# Patient Record
Sex: FEMALE | Race: ASIAN | NOT HISPANIC OR LATINO | URBAN - METROPOLITAN AREA
[De-identification: names, ages, dates, MRNs, and addresses within clinical notes are randomized per-mention and may not be internally consistent; named-entity substitution may affect disease eponyms.]

---

## 2024-01-01 ENCOUNTER — INPATIENT (INPATIENT)
Facility: HOSPITAL | Age: 74
LOS: 23 days | DRG: 176 | End: 2024-12-05
Attending: STUDENT IN AN ORGANIZED HEALTH CARE EDUCATION/TRAINING PROGRAM | Admitting: INTERNAL MEDICINE
Payer: MEDICARE

## 2024-01-01 VITALS
HEART RATE: 119 BPM | OXYGEN SATURATION: 81 % | DIASTOLIC BLOOD PRESSURE: 55 MMHG | TEMPERATURE: 99 F | SYSTOLIC BLOOD PRESSURE: 106 MMHG | RESPIRATION RATE: 18 BRPM

## 2024-01-01 VITALS — HEART RATE: 94 BPM | RESPIRATION RATE: 29 BRPM | OXYGEN SATURATION: 91 %

## 2024-01-01 DIAGNOSIS — R53.2 FUNCTIONAL QUADRIPLEGIA: ICD-10-CM

## 2024-01-01 DIAGNOSIS — I26.99 OTHER PULMONARY EMBOLISM WITHOUT ACUTE COR PULMONALE: ICD-10-CM

## 2024-01-01 DIAGNOSIS — C25.9 MALIGNANT NEOPLASM OF PANCREAS, UNSPECIFIED: ICD-10-CM

## 2024-01-01 DIAGNOSIS — Z29.9 ENCOUNTER FOR PROPHYLACTIC MEASURES, UNSPECIFIED: ICD-10-CM

## 2024-01-01 DIAGNOSIS — J96.01 ACUTE RESPIRATORY FAILURE WITH HYPOXIA: ICD-10-CM

## 2024-01-01 DIAGNOSIS — R06.03 ACUTE RESPIRATORY DISTRESS: ICD-10-CM

## 2024-01-01 DIAGNOSIS — Z51.5 ENCOUNTER FOR PALLIATIVE CARE: ICD-10-CM

## 2024-01-01 DIAGNOSIS — R52 PAIN, UNSPECIFIED: ICD-10-CM

## 2024-01-01 DIAGNOSIS — Z71.89 OTHER SPECIFIED COUNSELING: ICD-10-CM

## 2024-01-01 DIAGNOSIS — R45.1 RESTLESSNESS AND AGITATION: ICD-10-CM

## 2024-01-01 DIAGNOSIS — R09.89 OTHER SPECIFIED SYMPTOMS AND SIGNS INVOLVING THE CIRCULATORY AND RESPIRATORY SYSTEMS: ICD-10-CM

## 2024-01-01 DIAGNOSIS — R06.00 DYSPNEA, UNSPECIFIED: ICD-10-CM

## 2024-01-01 LAB
ADD ON TEST-SPECIMEN IN LAB: SIGNIFICANT CHANGE UP
ALBUMIN SERPL ELPH-MCNC: 1.9 G/DL — LOW (ref 3.3–5)
ALBUMIN SERPL ELPH-MCNC: 2 G/DL — LOW (ref 3.3–5)
ALBUMIN SERPL ELPH-MCNC: 2 G/DL — LOW (ref 3.3–5)
ALP SERPL-CCNC: 215 U/L — HIGH (ref 40–120)
ALP SERPL-CCNC: 217 U/L — HIGH (ref 40–120)
ALP SERPL-CCNC: 233 U/L — HIGH (ref 40–120)
ALT FLD-CCNC: 16 U/L — SIGNIFICANT CHANGE UP (ref 10–45)
ALT FLD-CCNC: 17 U/L — SIGNIFICANT CHANGE UP (ref 10–45)
ALT FLD-CCNC: 19 U/L — SIGNIFICANT CHANGE UP (ref 10–45)
ANION GAP SERPL CALC-SCNC: 10 MMOL/L — SIGNIFICANT CHANGE UP (ref 5–17)
ANION GAP SERPL CALC-SCNC: 10 MMOL/L — SIGNIFICANT CHANGE UP (ref 5–17)
ANION GAP SERPL CALC-SCNC: 11 MMOL/L — SIGNIFICANT CHANGE UP (ref 5–17)
ANION GAP SERPL CALC-SCNC: 12 MMOL/L — SIGNIFICANT CHANGE UP (ref 5–17)
ANION GAP SERPL CALC-SCNC: 12 MMOL/L — SIGNIFICANT CHANGE UP (ref 5–17)
ANION GAP SERPL CALC-SCNC: 8 MMOL/L — SIGNIFICANT CHANGE UP (ref 5–17)
APTT BLD: 24.8 SEC — SIGNIFICANT CHANGE UP (ref 24.5–35.6)
APTT BLD: 34.3 SEC — SIGNIFICANT CHANGE UP (ref 24.5–35.6)
APTT BLD: 37.3 SEC — HIGH (ref 24.5–35.6)
APTT BLD: 49 SEC — HIGH (ref 24.5–35.6)
APTT BLD: 49.8 SEC — HIGH (ref 24.5–35.6)
APTT BLD: 52.3 SEC — HIGH (ref 24.5–35.6)
APTT BLD: 57.3 SEC — HIGH (ref 24.5–35.6)
APTT BLD: 64.1 SEC — HIGH (ref 24.5–35.6)
APTT BLD: 66.8 SEC — HIGH (ref 24.5–35.6)
APTT BLD: 87 SEC — HIGH (ref 24.5–35.6)
APTT BLD: 91.2 SEC — HIGH (ref 24.5–35.6)
AST SERPL-CCNC: 31 U/L — SIGNIFICANT CHANGE UP (ref 10–40)
AST SERPL-CCNC: 33 U/L — SIGNIFICANT CHANGE UP (ref 10–40)
AST SERPL-CCNC: 40 U/L — SIGNIFICANT CHANGE UP (ref 10–40)
BASOPHILS # BLD AUTO: 0.03 K/UL — SIGNIFICANT CHANGE UP (ref 0–0.2)
BASOPHILS NFR BLD AUTO: 0.1 % — SIGNIFICANT CHANGE UP (ref 0–2)
BASOPHILS NFR BLD AUTO: 0.1 % — SIGNIFICANT CHANGE UP (ref 0–2)
BASOPHILS NFR BLD AUTO: 0.2 % — SIGNIFICANT CHANGE UP (ref 0–2)
BILIRUB SERPL-MCNC: 0.7 MG/DL — SIGNIFICANT CHANGE UP (ref 0.2–1.2)
BILIRUB SERPL-MCNC: 0.7 MG/DL — SIGNIFICANT CHANGE UP (ref 0.2–1.2)
BILIRUB SERPL-MCNC: 1.1 MG/DL — SIGNIFICANT CHANGE UP (ref 0.2–1.2)
BLD GP AB SCN SERPL QL: NEGATIVE — SIGNIFICANT CHANGE UP
BLD GP AB SCN SERPL QL: NEGATIVE — SIGNIFICANT CHANGE UP
BUN SERPL-MCNC: 18 MG/DL — SIGNIFICANT CHANGE UP (ref 7–23)
BUN SERPL-MCNC: 18 MG/DL — SIGNIFICANT CHANGE UP (ref 7–23)
BUN SERPL-MCNC: 20 MG/DL — SIGNIFICANT CHANGE UP (ref 7–23)
BUN SERPL-MCNC: 32 MG/DL — HIGH (ref 7–23)
BUN SERPL-MCNC: 33 MG/DL — HIGH (ref 7–23)
BUN SERPL-MCNC: 36 MG/DL — HIGH (ref 7–23)
CALCIUM SERPL-MCNC: 7.3 MG/DL — LOW (ref 8.4–10.5)
CALCIUM SERPL-MCNC: 7.8 MG/DL — LOW (ref 8.4–10.5)
CALCIUM SERPL-MCNC: 7.9 MG/DL — LOW (ref 8.4–10.5)
CALCIUM SERPL-MCNC: 8 MG/DL — LOW (ref 8.4–10.5)
CALCIUM SERPL-MCNC: 8.1 MG/DL — LOW (ref 8.4–10.5)
CALCIUM SERPL-MCNC: 8.1 MG/DL — LOW (ref 8.4–10.5)
CHLORIDE SERPL-SCNC: 107 MMOL/L — SIGNIFICANT CHANGE UP (ref 96–108)
CHLORIDE SERPL-SCNC: 108 MMOL/L — SIGNIFICANT CHANGE UP (ref 96–108)
CHLORIDE SERPL-SCNC: 108 MMOL/L — SIGNIFICANT CHANGE UP (ref 96–108)
CHLORIDE SERPL-SCNC: 109 MMOL/L — HIGH (ref 96–108)
CHLORIDE SERPL-SCNC: 110 MMOL/L — HIGH (ref 96–108)
CHLORIDE SERPL-SCNC: 112 MMOL/L — HIGH (ref 96–108)
CO2 SERPL-SCNC: 18 MMOL/L — LOW (ref 22–31)
CO2 SERPL-SCNC: 24 MMOL/L — SIGNIFICANT CHANGE UP (ref 22–31)
CO2 SERPL-SCNC: 25 MMOL/L — SIGNIFICANT CHANGE UP (ref 22–31)
CO2 SERPL-SCNC: 26 MMOL/L — SIGNIFICANT CHANGE UP (ref 22–31)
CREAT SERPL-MCNC: 0.51 MG/DL — SIGNIFICANT CHANGE UP (ref 0.5–1.3)
CREAT SERPL-MCNC: 0.59 MG/DL — SIGNIFICANT CHANGE UP (ref 0.5–1.3)
CREAT SERPL-MCNC: 0.66 MG/DL — SIGNIFICANT CHANGE UP (ref 0.5–1.3)
CREAT SERPL-MCNC: 0.7 MG/DL — SIGNIFICANT CHANGE UP (ref 0.5–1.3)
CREAT SERPL-MCNC: 0.74 MG/DL — SIGNIFICANT CHANGE UP (ref 0.5–1.3)
CREAT SERPL-MCNC: 0.8 MG/DL — SIGNIFICANT CHANGE UP (ref 0.5–1.3)
CULTURE RESULTS: SIGNIFICANT CHANGE UP
CULTURE RESULTS: SIGNIFICANT CHANGE UP
EGFR: 77 ML/MIN/1.73M2 — SIGNIFICANT CHANGE UP
EGFR: 85 ML/MIN/1.73M2 — SIGNIFICANT CHANGE UP
EGFR: 91 ML/MIN/1.73M2 — SIGNIFICANT CHANGE UP
EGFR: 92 ML/MIN/1.73M2 — SIGNIFICANT CHANGE UP
EGFR: 95 ML/MIN/1.73M2 — SIGNIFICANT CHANGE UP
EGFR: 98 ML/MIN/1.73M2 — SIGNIFICANT CHANGE UP
EOSINOPHIL # BLD AUTO: 0 K/UL — SIGNIFICANT CHANGE UP (ref 0–0.5)
EOSINOPHIL # BLD AUTO: 0 K/UL — SIGNIFICANT CHANGE UP (ref 0–0.5)
EOSINOPHIL # BLD AUTO: 0.01 K/UL — SIGNIFICANT CHANGE UP (ref 0–0.5)
EOSINOPHIL NFR BLD AUTO: 0 % — SIGNIFICANT CHANGE UP (ref 0–6)
EOSINOPHIL NFR BLD AUTO: 0 % — SIGNIFICANT CHANGE UP (ref 0–6)
EOSINOPHIL NFR BLD AUTO: 0.1 % — SIGNIFICANT CHANGE UP (ref 0–6)
GAS PNL BLDA: SIGNIFICANT CHANGE UP
GAS PNL BLDA: SIGNIFICANT CHANGE UP
GLUCOSE SERPL-MCNC: 102 MG/DL — HIGH (ref 70–99)
GLUCOSE SERPL-MCNC: 115 MG/DL — HIGH (ref 70–99)
GLUCOSE SERPL-MCNC: 120 MG/DL — HIGH (ref 70–99)
GLUCOSE SERPL-MCNC: 123 MG/DL — HIGH (ref 70–99)
GLUCOSE SERPL-MCNC: 148 MG/DL — HIGH (ref 70–99)
GLUCOSE SERPL-MCNC: 159 MG/DL — HIGH (ref 70–99)
HAV IGM SER-ACNC: SIGNIFICANT CHANGE UP
HBV CORE IGM SER-ACNC: SIGNIFICANT CHANGE UP
HBV SURFACE AG SER-ACNC: SIGNIFICANT CHANGE UP
HCT VFR BLD CALC: 25.5 % — LOW (ref 34.5–45)
HCT VFR BLD CALC: 25.8 % — LOW (ref 34.5–45)
HCT VFR BLD CALC: 28.3 % — LOW (ref 34.5–45)
HCT VFR BLD CALC: 28.7 % — LOW (ref 34.5–45)
HCT VFR BLD CALC: 32.7 % — LOW (ref 34.5–45)
HCT VFR BLD CALC: 36.6 % — SIGNIFICANT CHANGE UP (ref 34.5–45)
HCV AB S/CO SERPL IA: 0.23 S/CO — SIGNIFICANT CHANGE UP (ref 0–0.99)
HCV AB SERPL-IMP: SIGNIFICANT CHANGE UP
HGB BLD-MCNC: 10.9 G/DL — LOW (ref 11.5–15.5)
HGB BLD-MCNC: 7.5 G/DL — LOW (ref 11.5–15.5)
HGB BLD-MCNC: 7.6 G/DL — LOW (ref 11.5–15.5)
HGB BLD-MCNC: 8.8 G/DL — LOW (ref 11.5–15.5)
HGB BLD-MCNC: 8.8 G/DL — LOW (ref 11.5–15.5)
HGB BLD-MCNC: 9.6 G/DL — LOW (ref 11.5–15.5)
IMM GRANULOCYTES NFR BLD AUTO: 0.6 % — SIGNIFICANT CHANGE UP (ref 0–0.9)
IMM GRANULOCYTES NFR BLD AUTO: 0.9 % — SIGNIFICANT CHANGE UP (ref 0–0.9)
IMM GRANULOCYTES NFR BLD AUTO: 1 % — HIGH (ref 0–0.9)
INR BLD: 1.25 RATIO — HIGH (ref 0.85–1.16)
INR BLD: 1.3 RATIO — HIGH (ref 0.85–1.16)
INR BLD: 1.42 RATIO — HIGH (ref 0.85–1.16)
INR BLD: 1.58 RATIO — HIGH (ref 0.85–1.16)
INR BLD: 1.61 RATIO — HIGH (ref 0.85–1.16)
INR BLD: 1.67 RATIO — HIGH (ref 0.85–1.16)
INR BLD: 1.76 RATIO — HIGH (ref 0.85–1.16)
LYMPHOCYTES # BLD AUTO: 0.8 K/UL — LOW (ref 1–3.3)
LYMPHOCYTES # BLD AUTO: 0.85 K/UL — LOW (ref 1–3.3)
LYMPHOCYTES # BLD AUTO: 0.86 K/UL — LOW (ref 1–3.3)
LYMPHOCYTES # BLD AUTO: 3.5 % — LOW (ref 13–44)
LYMPHOCYTES # BLD AUTO: 3.9 % — LOW (ref 13–44)
LYMPHOCYTES # BLD AUTO: 5.5 % — LOW (ref 13–44)
MAGNESIUM SERPL-MCNC: 1.9 MG/DL — SIGNIFICANT CHANGE UP (ref 1.6–2.6)
MAGNESIUM SERPL-MCNC: 2 MG/DL — SIGNIFICANT CHANGE UP (ref 1.6–2.6)
MAGNESIUM SERPL-MCNC: 2 MG/DL — SIGNIFICANT CHANGE UP (ref 1.6–2.6)
MAGNESIUM SERPL-MCNC: 2.3 MG/DL — SIGNIFICANT CHANGE UP (ref 1.6–2.6)
MCHC RBC-ENTMCNC: 24.6 PG — LOW (ref 27–34)
MCHC RBC-ENTMCNC: 24.7 PG — LOW (ref 27–34)
MCHC RBC-ENTMCNC: 24.8 PG — LOW (ref 27–34)
MCHC RBC-ENTMCNC: 25.3 PG — LOW (ref 27–34)
MCHC RBC-ENTMCNC: 25.5 PG — LOW (ref 27–34)
MCHC RBC-ENTMCNC: 25.8 PG — LOW (ref 27–34)
MCHC RBC-ENTMCNC: 29.4 G/DL — LOW (ref 32–36)
MCHC RBC-ENTMCNC: 29.4 G/DL — LOW (ref 32–36)
MCHC RBC-ENTMCNC: 29.5 G/DL — LOW (ref 32–36)
MCHC RBC-ENTMCNC: 29.8 G/DL — LOW (ref 32–36)
MCHC RBC-ENTMCNC: 30.7 G/DL — LOW (ref 32–36)
MCHC RBC-ENTMCNC: 31.1 G/DL — LOW (ref 32–36)
MCV RBC AUTO: 83 FL — SIGNIFICANT CHANGE UP (ref 80–100)
MCV RBC AUTO: 83.2 FL — SIGNIFICANT CHANGE UP (ref 80–100)
MCV RBC AUTO: 83.2 FL — SIGNIFICANT CHANGE UP (ref 80–100)
MCV RBC AUTO: 83.5 FL — SIGNIFICANT CHANGE UP (ref 80–100)
MCV RBC AUTO: 84.1 FL — SIGNIFICANT CHANGE UP (ref 80–100)
MCV RBC AUTO: 85.9 FL — SIGNIFICANT CHANGE UP (ref 80–100)
MONOCYTES # BLD AUTO: 0.44 K/UL — SIGNIFICANT CHANGE UP (ref 0–0.9)
MONOCYTES # BLD AUTO: 0.48 K/UL — SIGNIFICANT CHANGE UP (ref 0–0.9)
MONOCYTES # BLD AUTO: 0.51 K/UL — SIGNIFICANT CHANGE UP (ref 0–0.9)
MONOCYTES NFR BLD AUTO: 1.9 % — LOW (ref 2–14)
MONOCYTES NFR BLD AUTO: 2.2 % — SIGNIFICANT CHANGE UP (ref 2–14)
MONOCYTES NFR BLD AUTO: 3.3 % — SIGNIFICANT CHANGE UP (ref 2–14)
MRSA PCR RESULT.: SIGNIFICANT CHANGE UP
NEUTROPHILS # BLD AUTO: 13.99 K/UL — HIGH (ref 1.8–7.4)
NEUTROPHILS # BLD AUTO: 20.27 K/UL — HIGH (ref 1.8–7.4)
NEUTROPHILS # BLD AUTO: 21.24 K/UL — HIGH (ref 1.8–7.4)
NEUTROPHILS NFR BLD AUTO: 90.3 % — HIGH (ref 43–77)
NEUTROPHILS NFR BLD AUTO: 92.8 % — HIGH (ref 43–77)
NEUTROPHILS NFR BLD AUTO: 93.6 % — HIGH (ref 43–77)
NRBC # BLD: 0 /100 WBCS — SIGNIFICANT CHANGE UP (ref 0–0)
NT-PROBNP SERPL-SCNC: 3518 PG/ML — HIGH (ref 0–300)
PHOSPHATE SERPL-MCNC: 3 MG/DL — SIGNIFICANT CHANGE UP (ref 2.5–4.5)
PHOSPHATE SERPL-MCNC: 3.5 MG/DL — SIGNIFICANT CHANGE UP (ref 2.5–4.5)
PHOSPHATE SERPL-MCNC: 3.9 MG/DL — SIGNIFICANT CHANGE UP (ref 2.5–4.5)
PLATELET # BLD AUTO: 106 K/UL — LOW (ref 150–400)
PLATELET # BLD AUTO: 142 K/UL — LOW (ref 150–400)
PLATELET # BLD AUTO: 153 K/UL — SIGNIFICANT CHANGE UP (ref 150–400)
PLATELET # BLD AUTO: 181 K/UL — SIGNIFICANT CHANGE UP (ref 150–400)
PLATELET # BLD AUTO: 211 K/UL — SIGNIFICANT CHANGE UP (ref 150–400)
PLATELET # BLD AUTO: 216 K/UL — SIGNIFICANT CHANGE UP (ref 150–400)
POTASSIUM SERPL-MCNC: 3.1 MMOL/L — LOW (ref 3.5–5.3)
POTASSIUM SERPL-MCNC: 3.2 MMOL/L — LOW (ref 3.5–5.3)
POTASSIUM SERPL-MCNC: 3.3 MMOL/L — LOW (ref 3.5–5.3)
POTASSIUM SERPL-MCNC: 3.6 MMOL/L — SIGNIFICANT CHANGE UP (ref 3.5–5.3)
POTASSIUM SERPL-MCNC: 3.7 MMOL/L — SIGNIFICANT CHANGE UP (ref 3.5–5.3)
POTASSIUM SERPL-MCNC: 5.2 MMOL/L — SIGNIFICANT CHANGE UP (ref 3.5–5.3)
POTASSIUM SERPL-SCNC: 3.1 MMOL/L — LOW (ref 3.5–5.3)
POTASSIUM SERPL-SCNC: 3.2 MMOL/L — LOW (ref 3.5–5.3)
POTASSIUM SERPL-SCNC: 3.3 MMOL/L — LOW (ref 3.5–5.3)
POTASSIUM SERPL-SCNC: 3.6 MMOL/L — SIGNIFICANT CHANGE UP (ref 3.5–5.3)
POTASSIUM SERPL-SCNC: 3.7 MMOL/L — SIGNIFICANT CHANGE UP (ref 3.5–5.3)
POTASSIUM SERPL-SCNC: 5.2 MMOL/L — SIGNIFICANT CHANGE UP (ref 3.5–5.3)
PROT SERPL-MCNC: 6.4 G/DL — SIGNIFICANT CHANGE UP (ref 6–8.3)
PROT SERPL-MCNC: 6.6 G/DL — SIGNIFICANT CHANGE UP (ref 6–8.3)
PROT SERPL-MCNC: 6.8 G/DL — SIGNIFICANT CHANGE UP (ref 6–8.3)
PROTHROM AB SERPL-ACNC: 14.4 SEC — HIGH (ref 9.9–13.4)
PROTHROM AB SERPL-ACNC: 14.8 SEC — HIGH (ref 9.9–13.4)
PROTHROM AB SERPL-ACNC: 16.1 SEC — HIGH (ref 9.9–13.4)
PROTHROM AB SERPL-ACNC: 17.9 SEC — HIGH (ref 9.9–13.4)
PROTHROM AB SERPL-ACNC: 18.4 SEC — HIGH (ref 9.9–13.4)
PROTHROM AB SERPL-ACNC: 18.9 SEC — HIGH (ref 9.9–13.4)
PROTHROM AB SERPL-ACNC: 20.1 SEC — HIGH (ref 9.9–13.4)
RBC # BLD: 2.97 M/UL — LOW (ref 3.8–5.2)
RBC # BLD: 3.09 M/UL — LOW (ref 3.8–5.2)
RBC # BLD: 3.41 M/UL — LOW (ref 3.8–5.2)
RBC # BLD: 3.45 M/UL — LOW (ref 3.8–5.2)
RBC # BLD: 3.89 M/UL — SIGNIFICANT CHANGE UP (ref 3.8–5.2)
RBC # BLD: 4.4 M/UL — SIGNIFICANT CHANGE UP (ref 3.8–5.2)
RBC # FLD: 19.2 % — HIGH (ref 10.3–14.5)
RBC # FLD: 19.4 % — HIGH (ref 10.3–14.5)
RBC # FLD: 19.7 % — HIGH (ref 10.3–14.5)
RBC # FLD: 19.9 % — HIGH (ref 10.3–14.5)
RBC # FLD: 20.4 % — HIGH (ref 10.3–14.5)
RBC # FLD: 21 % — HIGH (ref 10.3–14.5)
RH IG SCN BLD-IMP: POSITIVE — SIGNIFICANT CHANGE UP
RH IG SCN BLD-IMP: POSITIVE — SIGNIFICANT CHANGE UP
S AUREUS DNA NOSE QL NAA+PROBE: DETECTED
SODIUM SERPL-SCNC: 138 MMOL/L — SIGNIFICANT CHANGE UP (ref 135–145)
SODIUM SERPL-SCNC: 143 MMOL/L — SIGNIFICANT CHANGE UP (ref 135–145)
SODIUM SERPL-SCNC: 144 MMOL/L — SIGNIFICANT CHANGE UP (ref 135–145)
SODIUM SERPL-SCNC: 144 MMOL/L — SIGNIFICANT CHANGE UP (ref 135–145)
SODIUM SERPL-SCNC: 145 MMOL/L — SIGNIFICANT CHANGE UP (ref 135–145)
SODIUM SERPL-SCNC: 146 MMOL/L — HIGH (ref 135–145)
SPECIMEN SOURCE: SIGNIFICANT CHANGE UP
SPECIMEN SOURCE: SIGNIFICANT CHANGE UP
TROPONIN T, HIGH SENSITIVITY RESULT: 54 NG/L — HIGH (ref 0–51)
TSH SERPL-MCNC: 1.37 UIU/ML — SIGNIFICANT CHANGE UP (ref 0.27–4.2)
WBC # BLD: 12.32 K/UL — HIGH (ref 3.8–10.5)
WBC # BLD: 15.49 K/UL — HIGH (ref 3.8–10.5)
WBC # BLD: 21.85 K/UL — HIGH (ref 3.8–10.5)
WBC # BLD: 22.71 K/UL — HIGH (ref 3.8–10.5)
WBC # BLD: 6.18 K/UL — SIGNIFICANT CHANGE UP (ref 3.8–10.5)
WBC # BLD: 6.78 K/UL — SIGNIFICANT CHANGE UP (ref 3.8–10.5)
WBC # FLD AUTO: 12.32 K/UL — HIGH (ref 3.8–10.5)
WBC # FLD AUTO: 15.49 K/UL — HIGH (ref 3.8–10.5)
WBC # FLD AUTO: 21.85 K/UL — HIGH (ref 3.8–10.5)
WBC # FLD AUTO: 22.71 K/UL — HIGH (ref 3.8–10.5)
WBC # FLD AUTO: 6.18 K/UL — SIGNIFICANT CHANGE UP (ref 3.8–10.5)
WBC # FLD AUTO: 6.78 K/UL — SIGNIFICANT CHANGE UP (ref 3.8–10.5)

## 2024-01-01 PROCEDURE — 85027 COMPLETE CBC AUTOMATED: CPT

## 2024-01-01 PROCEDURE — 80048 BASIC METABOLIC PNL TOTAL CA: CPT

## 2024-01-01 PROCEDURE — 83880 ASSAY OF NATRIURETIC PEPTIDE: CPT

## 2024-01-01 PROCEDURE — 82435 ASSAY OF BLOOD CHLORIDE: CPT

## 2024-01-01 PROCEDURE — 83605 ASSAY OF LACTIC ACID: CPT

## 2024-01-01 PROCEDURE — 99291 CRITICAL CARE FIRST HOUR: CPT

## 2024-01-01 PROCEDURE — 99223 1ST HOSP IP/OBS HIGH 75: CPT

## 2024-01-01 PROCEDURE — 85730 THROMBOPLASTIN TIME PARTIAL: CPT

## 2024-01-01 PROCEDURE — 82947 ASSAY GLUCOSE BLOOD QUANT: CPT

## 2024-01-01 PROCEDURE — 93325 DOPPLER ECHO COLOR FLOW MAPG: CPT | Mod: 26

## 2024-01-01 PROCEDURE — 86901 BLOOD TYPING SEROLOGIC RH(D): CPT

## 2024-01-01 PROCEDURE — 93010 ELECTROCARDIOGRAM REPORT: CPT

## 2024-01-01 PROCEDURE — 99233 SBSQ HOSP IP/OBS HIGH 50: CPT

## 2024-01-01 PROCEDURE — 87640 STAPH A DNA AMP PROBE: CPT

## 2024-01-01 PROCEDURE — 99232 SBSQ HOSP IP/OBS MODERATE 35: CPT

## 2024-01-01 PROCEDURE — 93308 TTE F-UP OR LMTD: CPT | Mod: 26

## 2024-01-01 PROCEDURE — 99233 SBSQ HOSP IP/OBS HIGH 50: CPT | Mod: GC

## 2024-01-01 PROCEDURE — 84132 ASSAY OF SERUM POTASSIUM: CPT

## 2024-01-01 PROCEDURE — 84484 ASSAY OF TROPONIN QUANT: CPT

## 2024-01-01 PROCEDURE — 93005 ELECTROCARDIOGRAM TRACING: CPT

## 2024-01-01 PROCEDURE — 93325 DOPPLER ECHO COLOR FLOW MAPG: CPT

## 2024-01-01 PROCEDURE — 93308 TTE F-UP OR LMTD: CPT

## 2024-01-01 PROCEDURE — 36415 COLL VENOUS BLD VENIPUNCTURE: CPT

## 2024-01-01 PROCEDURE — 93321 DOPPLER ECHO F-UP/LMTD STD: CPT | Mod: 26

## 2024-01-01 PROCEDURE — 85025 COMPLETE CBC W/AUTO DIFF WBC: CPT

## 2024-01-01 PROCEDURE — 99231 SBSQ HOSP IP/OBS SF/LOW 25: CPT

## 2024-01-01 PROCEDURE — 94660 CPAP INITIATION&MGMT: CPT

## 2024-01-01 PROCEDURE — 82803 BLOOD GASES ANY COMBINATION: CPT

## 2024-01-01 PROCEDURE — 71045 X-RAY EXAM CHEST 1 VIEW: CPT

## 2024-01-01 PROCEDURE — 85520 HEPARIN ASSAY: CPT

## 2024-01-01 PROCEDURE — 71045 X-RAY EXAM CHEST 1 VIEW: CPT | Mod: 26,77

## 2024-01-01 PROCEDURE — 99222 1ST HOSP IP/OBS MODERATE 55: CPT | Mod: GC

## 2024-01-01 PROCEDURE — 99222 1ST HOSP IP/OBS MODERATE 55: CPT

## 2024-01-01 PROCEDURE — 86900 BLOOD TYPING SEROLOGIC ABO: CPT

## 2024-01-01 PROCEDURE — 87641 MR-STAPH DNA AMP PROBE: CPT

## 2024-01-01 PROCEDURE — 99497 ADVNCD CARE PLAN 30 MIN: CPT | Mod: 25

## 2024-01-01 PROCEDURE — 85014 HEMATOCRIT: CPT

## 2024-01-01 PROCEDURE — 80053 COMPREHEN METABOLIC PANEL: CPT

## 2024-01-01 PROCEDURE — 85610 PROTHROMBIN TIME: CPT

## 2024-01-01 PROCEDURE — 84443 ASSAY THYROID STIM HORMONE: CPT

## 2024-01-01 PROCEDURE — 84295 ASSAY OF SERUM SODIUM: CPT

## 2024-01-01 PROCEDURE — 84100 ASSAY OF PHOSPHORUS: CPT

## 2024-01-01 PROCEDURE — 83735 ASSAY OF MAGNESIUM: CPT

## 2024-01-01 PROCEDURE — 85018 HEMOGLOBIN: CPT

## 2024-01-01 PROCEDURE — 86850 RBC ANTIBODY SCREEN: CPT

## 2024-01-01 PROCEDURE — 82330 ASSAY OF CALCIUM: CPT

## 2024-01-01 PROCEDURE — 87040 BLOOD CULTURE FOR BACTERIA: CPT

## 2024-01-01 PROCEDURE — 80074 ACUTE HEPATITIS PANEL: CPT

## 2024-01-01 PROCEDURE — 93321 DOPPLER ECHO F-UP/LMTD STD: CPT

## 2024-01-01 RX ORDER — ACETAMINOPHEN 500MG 500 MG/1
650 TABLET, COATED ORAL EVERY 8 HOURS
Refills: 0 | Status: DISCONTINUED | OUTPATIENT
Start: 2024-01-01 | End: 2024-01-01

## 2024-01-01 RX ORDER — GLYCOPYRROLATE 1 MG/1
0.4 TABLET ORAL EVERY 6 HOURS
Refills: 0 | Status: DISCONTINUED | OUTPATIENT
Start: 2024-01-01 | End: 2024-01-01

## 2024-01-01 RX ORDER — BENZOCAINE 10 %
1 OINTMENT (GRAM) TOPICAL ONCE
Refills: 0 | Status: COMPLETED | OUTPATIENT
Start: 2024-01-01 | End: 2024-01-01

## 2024-01-01 RX ORDER — FENTANYL 12 UG/H
25 PATCH, EXTENDED RELEASE TRANSDERMAL ONCE
Refills: 0 | Status: DISCONTINUED | OUTPATIENT
Start: 2024-01-01 | End: 2024-01-01

## 2024-01-01 RX ORDER — PANTOPRAZOLE SODIUM 40 MG/1
40 TABLET, DELAYED RELEASE ORAL
Refills: 0 | Status: DISCONTINUED | OUTPATIENT
Start: 2024-01-01 | End: 2024-01-01

## 2024-01-01 RX ORDER — FUROSEMIDE 40 MG/1
20 TABLET ORAL ONCE
Refills: 0 | Status: COMPLETED | OUTPATIENT
Start: 2024-01-01 | End: 2024-01-01

## 2024-01-01 RX ORDER — FUROSEMIDE 40 MG/1
20 TABLET ORAL ONCE
Refills: 0 | Status: DISCONTINUED | OUTPATIENT
Start: 2024-01-01 | End: 2024-01-01

## 2024-01-01 RX ORDER — POTASSIUM CHLORIDE 600 MG/1
10 TABLET, EXTENDED RELEASE ORAL
Refills: 0 | Status: COMPLETED | OUTPATIENT
Start: 2024-01-01 | End: 2024-01-01

## 2024-01-01 RX ORDER — ENOXAPARIN SODIUM 30 MG/.3ML
50 INJECTION SUBCUTANEOUS
Refills: 0 | Status: DISCONTINUED | OUTPATIENT
Start: 2024-01-01 | End: 2024-01-01

## 2024-01-01 RX ORDER — SALIVA SUBSTITUTE COMBO NO.3
5 AEROSOL, SPRAY WITH PUMP (ML) MUCOUS MEMBRANE
Refills: 0 | Status: DISCONTINUED | OUTPATIENT
Start: 2024-01-01 | End: 2024-01-01

## 2024-01-01 RX ORDER — PANTOPRAZOLE SODIUM 40 MG/1
40 TABLET, DELAYED RELEASE ORAL DAILY
Refills: 0 | Status: DISCONTINUED | OUTPATIENT
Start: 2024-01-01 | End: 2024-01-01

## 2024-01-01 RX ORDER — PIPERACILLIN SODIUM AND TAZOBACTAM SODIUM 4; .5 G/20ML; G/20ML
3.38 INJECTION, POWDER, LYOPHILIZED, FOR SOLUTION INTRAVENOUS EVERY 8 HOURS
Refills: 0 | Status: DISCONTINUED | OUTPATIENT
Start: 2024-01-01 | End: 2024-01-01

## 2024-01-01 RX ORDER — BENZOCAINE 10 %
1 OINTMENT (GRAM) TOPICAL THREE TIMES A DAY
Refills: 0 | Status: DISCONTINUED | OUTPATIENT
Start: 2024-01-01 | End: 2024-01-01

## 2024-01-01 RX ORDER — CYANOCOBALAMIN/FOLIC AC/VIT B6 1-2.2-25MG
1 TABLET ORAL DAILY
Refills: 0 | Status: DISCONTINUED | OUTPATIENT
Start: 2024-01-01 | End: 2024-01-01

## 2024-01-01 RX ORDER — VANCOMYCIN HCL 900 MCG/MG
1250 POWDER (GRAM) MISCELLANEOUS ONCE
Refills: 0 | Status: DISCONTINUED | OUTPATIENT
Start: 2024-01-01 | End: 2024-01-01

## 2024-01-01 RX ORDER — HEPARIN SODIUM,PORCINE 1000/ML
1250 VIAL (ML) INJECTION
Qty: 25000 | Refills: 0 | Status: DISCONTINUED | OUTPATIENT
Start: 2024-01-01 | End: 2024-01-01

## 2024-01-01 RX ORDER — CHLORHEXIDINE GLUCONATE 1.2 MG/ML
1 RINSE ORAL DAILY
Refills: 0 | Status: DISCONTINUED | OUTPATIENT
Start: 2024-01-01 | End: 2024-01-01

## 2024-01-01 RX ORDER — MULTIVIT WITH MINERALS/LUTEIN
500 TABLET ORAL DAILY
Refills: 0 | Status: DISCONTINUED | OUTPATIENT
Start: 2024-01-01 | End: 2024-01-01

## 2024-01-01 RX ORDER — HEPARIN SODIUM,PORCINE 1000/ML
650 VIAL (ML) INJECTION
Qty: 25000 | Refills: 0 | Status: DISCONTINUED | OUTPATIENT
Start: 2024-01-01 | End: 2024-01-01

## 2024-01-01 RX ORDER — POTASSIUM CHLORIDE 600 MG/1
40 TABLET, EXTENDED RELEASE ORAL
Refills: 0 | Status: DISCONTINUED | OUTPATIENT
Start: 2024-01-01 | End: 2024-01-01

## 2024-01-01 RX ORDER — ACETAMINOPHEN 500MG 500 MG/1
1000 TABLET, COATED ORAL ONCE
Refills: 0 | Status: COMPLETED | OUTPATIENT
Start: 2024-01-01 | End: 2024-01-01

## 2024-01-01 RX ORDER — SODIUM CHLORIDE 9 MG/ML
1000 INJECTION, SOLUTION INTRAMUSCULAR; INTRAVENOUS; SUBCUTANEOUS
Refills: 0 | Status: DISCONTINUED | OUTPATIENT
Start: 2024-01-01 | End: 2024-01-01

## 2024-01-01 RX ORDER — LORAZEPAM 2 MG/1
0.25 TABLET ORAL
Refills: 0 | Status: DISCONTINUED | OUTPATIENT
Start: 2024-01-01 | End: 2024-01-01

## 2024-01-01 RX ORDER — SENNOSIDES 8.6 MG
2 TABLET ORAL AT BEDTIME
Refills: 0 | Status: DISCONTINUED | OUTPATIENT
Start: 2024-01-01 | End: 2024-01-01

## 2024-01-01 RX ORDER — CHLORHEXIDINE GLUCONATE 1.2 MG/ML
1 RINSE ORAL
Refills: 0 | Status: DISCONTINUED | OUTPATIENT
Start: 2024-01-01 | End: 2024-01-01

## 2024-01-01 RX ORDER — ACETAMINOPHEN 500MG 500 MG/1
1000 TABLET, COATED ORAL ONCE
Refills: 0 | Status: DISCONTINUED | OUTPATIENT
Start: 2024-01-01 | End: 2024-01-01

## 2024-01-01 RX ORDER — FUROSEMIDE 40 MG/1
40 TABLET ORAL ONCE
Refills: 0 | Status: DISCONTINUED | OUTPATIENT
Start: 2024-01-01 | End: 2024-01-01

## 2024-01-01 RX ORDER — PIPERACILLIN SODIUM AND TAZOBACTAM SODIUM 4; .5 G/20ML; G/20ML
3.38 INJECTION, POWDER, LYOPHILIZED, FOR SOLUTION INTRAVENOUS ONCE
Refills: 0 | Status: COMPLETED | OUTPATIENT
Start: 2024-01-01 | End: 2024-01-01

## 2024-01-01 RX ORDER — VANCOMYCIN HCL 900 MCG/MG
1000 POWDER (GRAM) MISCELLANEOUS ONCE
Refills: 0 | Status: COMPLETED | OUTPATIENT
Start: 2024-01-01 | End: 2024-01-01

## 2024-01-01 RX ORDER — FUROSEMIDE 40 MG/1
40 TABLET ORAL ONCE
Refills: 0 | Status: COMPLETED | OUTPATIENT
Start: 2024-01-01 | End: 2024-01-01

## 2024-01-01 RX ORDER — HEPARIN SODIUM,PORCINE 1000/ML
1200 VIAL (ML) INJECTION
Qty: 25000 | Refills: 0 | Status: DISCONTINUED | OUTPATIENT
Start: 2024-01-01 | End: 2024-01-01

## 2024-01-01 RX ADMIN — POTASSIUM CHLORIDE 100 MILLIEQUIVALENT(S): 600 TABLET, EXTENDED RELEASE ORAL at 23:36

## 2024-01-01 RX ADMIN — PIPERACILLIN SODIUM AND TAZOBACTAM SODIUM 25 GRAM(S): 4; .5 INJECTION, POWDER, LYOPHILIZED, FOR SOLUTION INTRAVENOUS at 21:25

## 2024-01-01 RX ADMIN — Medication 4 MILLIGRAM(S): at 17:31

## 2024-01-01 RX ADMIN — POTASSIUM CHLORIDE 100 MILLIEQUIVALENT(S): 600 TABLET, EXTENDED RELEASE ORAL at 11:00

## 2024-01-01 RX ADMIN — PANTOPRAZOLE SODIUM 40 MILLIGRAM(S): 40 TABLET, DELAYED RELEASE ORAL at 05:06

## 2024-01-01 RX ADMIN — FENTANYL 25 MICROGRAM(S): 12 PATCH, EXTENDED RELEASE TRANSDERMAL at 15:15

## 2024-01-01 RX ADMIN — Medication 5 MILLILITER(S): at 05:48

## 2024-01-01 RX ADMIN — POTASSIUM CHLORIDE 100 MILLIEQUIVALENT(S): 600 TABLET, EXTENDED RELEASE ORAL at 06:49

## 2024-01-01 RX ADMIN — Medication 100 GRAM(S): at 22:02

## 2024-01-01 RX ADMIN — PANTOPRAZOLE SODIUM 40 MILLIGRAM(S): 40 TABLET, DELAYED RELEASE ORAL at 17:27

## 2024-01-01 RX ADMIN — Medication 5 MILLILITER(S): at 11:11

## 2024-01-01 RX ADMIN — Medication 2 MILLIGRAM(S): at 23:52

## 2024-01-01 RX ADMIN — SODIUM CHLORIDE 10 MILLILITER(S): 9 INJECTION, SOLUTION INTRAMUSCULAR; INTRAVENOUS; SUBCUTANEOUS at 07:02

## 2024-01-01 RX ADMIN — SODIUM CHLORIDE 10 MILLILITER(S): 9 INJECTION, SOLUTION INTRAMUSCULAR; INTRAVENOUS; SUBCUTANEOUS at 05:45

## 2024-01-01 RX ADMIN — Medication 2 MILLIGRAM(S): at 22:45

## 2024-01-01 RX ADMIN — Medication 1 SPRAY(S): at 16:01

## 2024-01-01 RX ADMIN — Medication 2 MILLIGRAM(S): at 06:04

## 2024-01-01 RX ADMIN — Medication 2 MILLIGRAM(S): at 11:01

## 2024-01-01 RX ADMIN — Medication 5 MILLILITER(S): at 00:29

## 2024-01-01 RX ADMIN — Medication 2 MILLIGRAM(S): at 02:21

## 2024-01-01 RX ADMIN — Medication 11 UNIT(S)/HR: at 22:18

## 2024-01-01 RX ADMIN — Medication 13 UNIT(S)/HR: at 09:46

## 2024-01-01 RX ADMIN — Medication 5 MILLILITER(S): at 06:52

## 2024-01-01 RX ADMIN — Medication 5 MILLILITER(S): at 05:12

## 2024-01-01 RX ADMIN — PANTOPRAZOLE SODIUM 40 MILLIGRAM(S): 40 TABLET, DELAYED RELEASE ORAL at 19:02

## 2024-01-01 RX ADMIN — PIPERACILLIN SODIUM AND TAZOBACTAM SODIUM 3.38 GRAM(S): 4; .5 INJECTION, POWDER, LYOPHILIZED, FOR SOLUTION INTRAVENOUS at 18:30

## 2024-01-01 RX ADMIN — ENOXAPARIN SODIUM 50 MILLIGRAM(S): 30 INJECTION SUBCUTANEOUS at 17:42

## 2024-01-01 RX ADMIN — Medication 1 SPRAY(S): at 20:49

## 2024-01-01 RX ADMIN — Medication 5 MILLILITER(S): at 13:30

## 2024-01-01 RX ADMIN — Medication 5 MILLILITER(S): at 17:54

## 2024-01-01 RX ADMIN — Medication 5 MILLILITER(S): at 12:23

## 2024-01-01 RX ADMIN — PANTOPRAZOLE SODIUM 40 MILLIGRAM(S): 40 TABLET, DELAYED RELEASE ORAL at 18:14

## 2024-01-01 RX ADMIN — Medication 5 MILLILITER(S): at 17:42

## 2024-01-01 RX ADMIN — Medication 12 UNIT(S)/HR: at 05:21

## 2024-01-01 RX ADMIN — Medication 2 MILLIGRAM(S): at 02:05

## 2024-01-01 RX ADMIN — Medication 6 MILLIGRAM(S): at 08:30

## 2024-01-01 RX ADMIN — Medication 4 MILLIGRAM(S): at 10:08

## 2024-01-01 RX ADMIN — Medication 2 MILLIGRAM(S): at 23:20

## 2024-01-01 RX ADMIN — Medication 2 MILLIGRAM(S): at 12:24

## 2024-01-01 RX ADMIN — PANTOPRAZOLE SODIUM 40 MILLIGRAM(S): 40 TABLET, DELAYED RELEASE ORAL at 18:35

## 2024-01-01 RX ADMIN — PIPERACILLIN SODIUM AND TAZOBACTAM SODIUM 25 GRAM(S): 4; .5 INJECTION, POWDER, LYOPHILIZED, FOR SOLUTION INTRAVENOUS at 05:17

## 2024-01-01 RX ADMIN — FENTANYL 25 MICROGRAM(S): 12 PATCH, EXTENDED RELEASE TRANSDERMAL at 15:12

## 2024-01-01 RX ADMIN — Medication 4 MILLIGRAM(S): at 09:53

## 2024-01-01 RX ADMIN — Medication 5 MILLILITER(S): at 13:53

## 2024-01-01 RX ADMIN — PIPERACILLIN SODIUM AND TAZOBACTAM SODIUM 25 GRAM(S): 4; .5 INJECTION, POWDER, LYOPHILIZED, FOR SOLUTION INTRAVENOUS at 05:04

## 2024-01-01 RX ADMIN — Medication 5 MILLILITER(S): at 12:30

## 2024-01-01 RX ADMIN — Medication 2 MILLIGRAM(S): at 09:08

## 2024-01-01 RX ADMIN — ACETAMINOPHEN 500MG 1000 MILLIGRAM(S): 500 TABLET, COATED ORAL at 09:45

## 2024-01-01 RX ADMIN — Medication 2 MILLIGRAM(S): at 05:18

## 2024-01-01 RX ADMIN — PIPERACILLIN SODIUM AND TAZOBACTAM SODIUM 25 GRAM(S): 4; .5 INJECTION, POWDER, LYOPHILIZED, FOR SOLUTION INTRAVENOUS at 06:48

## 2024-01-01 RX ADMIN — PIPERACILLIN SODIUM AND TAZOBACTAM SODIUM 25 GRAM(S): 4; .5 INJECTION, POWDER, LYOPHILIZED, FOR SOLUTION INTRAVENOUS at 05:24

## 2024-01-01 RX ADMIN — CHLORHEXIDINE GLUCONATE 1 APPLICATION(S): 1.2 RINSE ORAL at 12:45

## 2024-01-01 RX ADMIN — Medication 2 MILLIGRAM(S): at 19:56

## 2024-01-01 RX ADMIN — Medication 4 MILLIGRAM(S): at 10:25

## 2024-01-01 RX ADMIN — PANTOPRAZOLE SODIUM 40 MILLIGRAM(S): 40 TABLET, DELAYED RELEASE ORAL at 05:07

## 2024-01-01 RX ADMIN — Medication 2 MILLIGRAM(S): at 17:44

## 2024-01-01 RX ADMIN — PIPERACILLIN SODIUM AND TAZOBACTAM SODIUM 25 GRAM(S): 4; .5 INJECTION, POWDER, LYOPHILIZED, FOR SOLUTION INTRAVENOUS at 21:41

## 2024-01-01 RX ADMIN — Medication 2 MILLIGRAM(S): at 05:34

## 2024-01-01 RX ADMIN — ENOXAPARIN SODIUM 50 MILLIGRAM(S): 30 INJECTION SUBCUTANEOUS at 17:30

## 2024-01-01 RX ADMIN — Medication 2 MILLIGRAM(S): at 22:06

## 2024-01-01 RX ADMIN — Medication 6 MILLIGRAM(S): at 08:45

## 2024-01-01 RX ADMIN — Medication 2 MILLIGRAM(S): at 14:31

## 2024-01-01 RX ADMIN — PANTOPRAZOLE SODIUM 40 MILLIGRAM(S): 40 TABLET, DELAYED RELEASE ORAL at 17:24

## 2024-01-01 RX ADMIN — Medication 5 MILLILITER(S): at 06:10

## 2024-01-01 RX ADMIN — POTASSIUM CHLORIDE 100 MILLIEQUIVALENT(S): 600 TABLET, EXTENDED RELEASE ORAL at 02:11

## 2024-01-01 RX ADMIN — CHLORHEXIDINE GLUCONATE 1 APPLICATION(S): 1.2 RINSE ORAL at 11:11

## 2024-01-01 RX ADMIN — CHLORHEXIDINE GLUCONATE 1 APPLICATION(S): 1.2 RINSE ORAL at 13:42

## 2024-01-01 RX ADMIN — Medication 12 UNIT(S)/HR: at 12:34

## 2024-01-01 RX ADMIN — Medication 1 MG/HR: at 19:14

## 2024-01-01 RX ADMIN — Medication 2 MILLIGRAM(S): at 04:48

## 2024-01-01 RX ADMIN — PIPERACILLIN SODIUM AND TAZOBACTAM SODIUM 25 GRAM(S): 4; .5 INJECTION, POWDER, LYOPHILIZED, FOR SOLUTION INTRAVENOUS at 05:02

## 2024-01-01 RX ADMIN — PANTOPRAZOLE SODIUM 40 MILLIGRAM(S): 40 TABLET, DELAYED RELEASE ORAL at 05:51

## 2024-01-01 RX ADMIN — Medication 2 MILLIGRAM(S): at 23:45

## 2024-01-01 RX ADMIN — PANTOPRAZOLE SODIUM 40 MILLIGRAM(S): 40 TABLET, DELAYED RELEASE ORAL at 05:17

## 2024-01-01 RX ADMIN — Medication 4 MILLIGRAM(S): at 03:11

## 2024-01-01 RX ADMIN — Medication 5 MILLILITER(S): at 17:28

## 2024-01-01 RX ADMIN — PIPERACILLIN SODIUM AND TAZOBACTAM SODIUM 25 GRAM(S): 4; .5 INJECTION, POWDER, LYOPHILIZED, FOR SOLUTION INTRAVENOUS at 12:25

## 2024-01-01 RX ADMIN — Medication 5 MILLILITER(S): at 23:20

## 2024-01-01 RX ADMIN — Medication 5 MILLILITER(S): at 06:04

## 2024-01-01 RX ADMIN — PANTOPRAZOLE SODIUM 40 MILLIGRAM(S): 40 TABLET, DELAYED RELEASE ORAL at 05:48

## 2024-01-01 RX ADMIN — Medication 5 MILLILITER(S): at 23:18

## 2024-01-01 RX ADMIN — Medication 5 MILLILITER(S): at 23:25

## 2024-01-01 RX ADMIN — Medication 4 MILLIGRAM(S): at 08:55

## 2024-01-01 RX ADMIN — Medication 2 MILLIGRAM(S): at 02:50

## 2024-01-01 RX ADMIN — Medication 2 MILLIGRAM(S): at 17:54

## 2024-01-01 RX ADMIN — Medication 6 MILLIGRAM(S): at 12:31

## 2024-01-01 RX ADMIN — PIPERACILLIN SODIUM AND TAZOBACTAM SODIUM 25 GRAM(S): 4; .5 INJECTION, POWDER, LYOPHILIZED, FOR SOLUTION INTRAVENOUS at 05:51

## 2024-01-01 RX ADMIN — PANTOPRAZOLE SODIUM 40 MILLIGRAM(S): 40 TABLET, DELAYED RELEASE ORAL at 17:28

## 2024-01-01 RX ADMIN — SODIUM CHLORIDE 10 MILLILITER(S): 9 INJECTION, SOLUTION INTRAMUSCULAR; INTRAVENOUS; SUBCUTANEOUS at 12:39

## 2024-01-01 RX ADMIN — PANTOPRAZOLE SODIUM 40 MILLIGRAM(S): 40 TABLET, DELAYED RELEASE ORAL at 22:24

## 2024-01-01 RX ADMIN — Medication 5 MILLILITER(S): at 18:29

## 2024-01-01 RX ADMIN — Medication 5 MILLILITER(S): at 05:17

## 2024-01-01 RX ADMIN — Medication 5 MILLILITER(S): at 00:56

## 2024-01-01 RX ADMIN — ACETAMINOPHEN 500MG 400 MILLIGRAM(S): 500 TABLET, COATED ORAL at 09:11

## 2024-01-01 RX ADMIN — PANTOPRAZOLE SODIUM 40 MILLIGRAM(S): 40 TABLET, DELAYED RELEASE ORAL at 17:20

## 2024-01-01 RX ADMIN — Medication 2 MILLIGRAM(S): at 22:19

## 2024-01-01 RX ADMIN — PANTOPRAZOLE SODIUM 40 MILLIGRAM(S): 40 TABLET, DELAYED RELEASE ORAL at 11:01

## 2024-01-01 RX ADMIN — PANTOPRAZOLE SODIUM 40 MILLIGRAM(S): 40 TABLET, DELAYED RELEASE ORAL at 05:09

## 2024-01-01 RX ADMIN — Medication 5 MILLILITER(S): at 12:50

## 2024-01-01 RX ADMIN — PIPERACILLIN SODIUM AND TAZOBACTAM SODIUM 25 GRAM(S): 4; .5 INJECTION, POWDER, LYOPHILIZED, FOR SOLUTION INTRAVENOUS at 13:09

## 2024-01-01 RX ADMIN — Medication 5 MILLILITER(S): at 13:42

## 2024-01-01 RX ADMIN — Medication 1 MILLIGRAM(S): at 09:31

## 2024-01-01 RX ADMIN — Medication 5 MILLILITER(S): at 12:45

## 2024-01-01 RX ADMIN — POTASSIUM CHLORIDE 100 MILLIEQUIVALENT(S): 600 TABLET, EXTENDED RELEASE ORAL at 00:46

## 2024-01-01 RX ADMIN — POTASSIUM CHLORIDE 100 MILLIEQUIVALENT(S): 600 TABLET, EXTENDED RELEASE ORAL at 12:33

## 2024-01-01 RX ADMIN — Medication 2 MILLIGRAM(S): at 12:49

## 2024-01-01 RX ADMIN — PANTOPRAZOLE SODIUM 40 MILLIGRAM(S): 40 TABLET, DELAYED RELEASE ORAL at 18:29

## 2024-01-01 RX ADMIN — Medication 8 MILLIGRAM(S): at 23:59

## 2024-01-01 RX ADMIN — Medication 5 MILLILITER(S): at 05:08

## 2024-01-01 RX ADMIN — CHLORHEXIDINE GLUCONATE 1 APPLICATION(S): 1.2 RINSE ORAL at 11:04

## 2024-01-01 RX ADMIN — ENOXAPARIN SODIUM 50 MILLIGRAM(S): 30 INJECTION SUBCUTANEOUS at 19:27

## 2024-01-01 RX ADMIN — PANTOPRAZOLE SODIUM 40 MILLIGRAM(S): 40 TABLET, DELAYED RELEASE ORAL at 05:01

## 2024-01-01 RX ADMIN — Medication 5 MILLILITER(S): at 05:04

## 2024-01-01 RX ADMIN — Medication 4 MILLIGRAM(S): at 17:09

## 2024-01-01 RX ADMIN — PANTOPRAZOLE SODIUM 40 MILLIGRAM(S): 40 TABLET, DELAYED RELEASE ORAL at 12:45

## 2024-01-01 RX ADMIN — Medication 5 MILLILITER(S): at 23:52

## 2024-01-01 RX ADMIN — Medication 5 MILLILITER(S): at 05:39

## 2024-01-01 RX ADMIN — Medication 1 MILLIGRAM(S): at 09:16

## 2024-01-01 RX ADMIN — Medication 2 MILLIGRAM(S): at 06:18

## 2024-01-01 RX ADMIN — Medication 2 MILLIGRAM(S): at 13:10

## 2024-01-01 RX ADMIN — PANTOPRAZOLE SODIUM 40 MILLIGRAM(S): 40 TABLET, DELAYED RELEASE ORAL at 18:25

## 2024-01-01 RX ADMIN — Medication 1 SPRAY(S): at 05:17

## 2024-01-01 RX ADMIN — Medication 2 MILLIGRAM(S): at 15:50

## 2024-01-01 RX ADMIN — Medication 5 MILLILITER(S): at 14:45

## 2024-01-01 RX ADMIN — Medication 4 MILLIGRAM(S): at 22:17

## 2024-01-01 RX ADMIN — PANTOPRAZOLE SODIUM 40 MILLIGRAM(S): 40 TABLET, DELAYED RELEASE ORAL at 05:12

## 2024-01-01 RX ADMIN — Medication 5 MILLILITER(S): at 06:18

## 2024-01-01 RX ADMIN — GLYCOPYRROLATE 0.4 MILLIGRAM(S): 1 TABLET ORAL at 09:16

## 2024-01-01 RX ADMIN — PIPERACILLIN SODIUM AND TAZOBACTAM SODIUM 25 GRAM(S): 4; .5 INJECTION, POWDER, LYOPHILIZED, FOR SOLUTION INTRAVENOUS at 21:46

## 2024-01-01 RX ADMIN — Medication 12 UNIT(S)/HR: at 08:51

## 2024-01-01 RX ADMIN — Medication 5 MILLILITER(S): at 23:56

## 2024-01-01 RX ADMIN — ENOXAPARIN SODIUM 50 MILLIGRAM(S): 30 INJECTION SUBCUTANEOUS at 06:14

## 2024-01-01 RX ADMIN — ENOXAPARIN SODIUM 50 MILLIGRAM(S): 30 INJECTION SUBCUTANEOUS at 05:21

## 2024-01-01 RX ADMIN — PANTOPRAZOLE SODIUM 40 MILLIGRAM(S): 40 TABLET, DELAYED RELEASE ORAL at 06:14

## 2024-01-01 RX ADMIN — Medication 5 MILLILITER(S): at 15:48

## 2024-01-01 RX ADMIN — Medication 2 MILLIGRAM(S): at 05:03

## 2024-01-01 RX ADMIN — PANTOPRAZOLE SODIUM 40 MILLIGRAM(S): 40 TABLET, DELAYED RELEASE ORAL at 18:18

## 2024-01-01 RX ADMIN — Medication 2 MILLIGRAM(S): at 20:11

## 2024-01-01 RX ADMIN — Medication 5 MILLILITER(S): at 13:17

## 2024-01-01 RX ADMIN — Medication 8 UNIT(S)/HR: at 06:08

## 2024-01-01 RX ADMIN — ENOXAPARIN SODIUM 50 MILLIGRAM(S): 30 INJECTION SUBCUTANEOUS at 17:32

## 2024-01-01 RX ADMIN — CHLORHEXIDINE GLUCONATE 1 APPLICATION(S): 1.2 RINSE ORAL at 22:25

## 2024-01-01 RX ADMIN — Medication 12.5 UNIT(S)/HR: at 09:05

## 2024-01-01 RX ADMIN — POTASSIUM CHLORIDE 100 MILLIEQUIVALENT(S): 600 TABLET, EXTENDED RELEASE ORAL at 21:46

## 2024-01-01 RX ADMIN — Medication 5 MILLILITER(S): at 23:28

## 2024-01-01 RX ADMIN — PANTOPRAZOLE SODIUM 40 MILLIGRAM(S): 40 TABLET, DELAYED RELEASE ORAL at 13:17

## 2024-01-01 RX ADMIN — Medication 5 MILLILITER(S): at 00:00

## 2024-01-01 RX ADMIN — FENTANYL 25 MICROGRAM(S): 12 PATCH, EXTENDED RELEASE TRANSDERMAL at 21:25

## 2024-01-01 RX ADMIN — LORAZEPAM 0.25 MILLIGRAM(S): 2 TABLET ORAL at 09:52

## 2024-01-01 RX ADMIN — Medication 5 MILLILITER(S): at 11:23

## 2024-01-01 RX ADMIN — Medication 4 MILLIGRAM(S): at 06:10

## 2024-01-01 RX ADMIN — ENOXAPARIN SODIUM 50 MILLIGRAM(S): 30 INJECTION SUBCUTANEOUS at 05:07

## 2024-01-01 RX ADMIN — PANTOPRAZOLE SODIUM 40 MILLIGRAM(S): 40 TABLET, DELAYED RELEASE ORAL at 17:55

## 2024-01-01 RX ADMIN — Medication 1 MG/HR: at 07:30

## 2024-01-01 RX ADMIN — PIPERACILLIN SODIUM AND TAZOBACTAM SODIUM 200 GRAM(S): 4; .5 INJECTION, POWDER, LYOPHILIZED, FOR SOLUTION INTRAVENOUS at 02:41

## 2024-01-01 RX ADMIN — Medication 2 MILLIGRAM(S): at 17:43

## 2024-01-01 RX ADMIN — PANTOPRAZOLE SODIUM 40 MILLIGRAM(S): 40 TABLET, DELAYED RELEASE ORAL at 05:16

## 2024-01-01 RX ADMIN — PIPERACILLIN SODIUM AND TAZOBACTAM SODIUM 25 GRAM(S): 4; .5 INJECTION, POWDER, LYOPHILIZED, FOR SOLUTION INTRAVENOUS at 20:50

## 2024-01-01 RX ADMIN — Medication 2 MILLIGRAM(S): at 05:08

## 2024-01-01 RX ADMIN — Medication 4 MILLIGRAM(S): at 08:34

## 2024-01-01 RX ADMIN — PANTOPRAZOLE SODIUM 40 MILLIGRAM(S): 40 TABLET, DELAYED RELEASE ORAL at 16:56

## 2024-01-01 RX ADMIN — PIPERACILLIN SODIUM AND TAZOBACTAM SODIUM 25 GRAM(S): 4; .5 INJECTION, POWDER, LYOPHILIZED, FOR SOLUTION INTRAVENOUS at 12:21

## 2024-01-01 RX ADMIN — Medication 2 MILLIGRAM(S): at 22:21

## 2024-01-01 RX ADMIN — SODIUM CHLORIDE 10 MILLILITER(S): 9 INJECTION, SOLUTION INTRAMUSCULAR; INTRAVENOUS; SUBCUTANEOUS at 12:34

## 2024-01-01 RX ADMIN — CHLORHEXIDINE GLUCONATE 1 APPLICATION(S): 1.2 RINSE ORAL at 15:33

## 2024-01-01 RX ADMIN — Medication 4 MILLIGRAM(S): at 21:34

## 2024-01-01 RX ADMIN — Medication 5 MILLILITER(S): at 19:02

## 2024-01-01 RX ADMIN — Medication 6.5 UNIT(S)/HR: at 23:23

## 2024-01-01 RX ADMIN — PANTOPRAZOLE SODIUM 40 MILLIGRAM(S): 40 TABLET, DELAYED RELEASE ORAL at 05:21

## 2024-01-01 RX ADMIN — Medication 8 MILLIGRAM(S): at 23:44

## 2024-01-01 RX ADMIN — PIPERACILLIN SODIUM AND TAZOBACTAM SODIUM 25 GRAM(S): 4; .5 INJECTION, POWDER, LYOPHILIZED, FOR SOLUTION INTRAVENOUS at 21:23

## 2024-01-01 RX ADMIN — Medication 5 MILLILITER(S): at 17:09

## 2024-01-01 RX ADMIN — Medication 250 MILLIGRAM(S): at 12:44

## 2024-01-01 RX ADMIN — Medication 1 MG/HR: at 07:03

## 2024-01-01 RX ADMIN — LORAZEPAM 0.25 MILLIGRAM(S): 2 TABLET ORAL at 09:29

## 2024-01-01 RX ADMIN — Medication 5 MILLILITER(S): at 18:17

## 2024-01-01 RX ADMIN — Medication 5 MILLILITER(S): at 05:51

## 2024-01-01 RX ADMIN — Medication 12 UNIT(S)/HR: at 19:39

## 2024-01-01 RX ADMIN — PANTOPRAZOLE SODIUM 40 MILLIGRAM(S): 40 TABLET, DELAYED RELEASE ORAL at 05:04

## 2024-01-01 RX ADMIN — Medication 12 UNIT(S)/HR: at 12:20

## 2024-01-01 RX ADMIN — Medication 4 MILLIGRAM(S): at 10:10

## 2024-01-01 RX ADMIN — Medication 5 MILLILITER(S): at 05:07

## 2024-01-01 RX ADMIN — PANTOPRAZOLE SODIUM 40 MILLIGRAM(S): 40 TABLET, DELAYED RELEASE ORAL at 17:09

## 2024-01-01 RX ADMIN — Medication 4 MILLIGRAM(S): at 01:54

## 2024-01-01 RX ADMIN — Medication 2 MILLIGRAM(S): at 06:45

## 2024-01-01 RX ADMIN — ENOXAPARIN SODIUM 50 MILLIGRAM(S): 30 INJECTION SUBCUTANEOUS at 19:05

## 2024-01-01 RX ADMIN — ENOXAPARIN SODIUM 50 MILLIGRAM(S): 30 INJECTION SUBCUTANEOUS at 10:36

## 2024-01-01 RX ADMIN — Medication 10 UNIT(S)/HR: at 19:05

## 2024-01-01 RX ADMIN — PANTOPRAZOLE SODIUM 40 MILLIGRAM(S): 40 TABLET, DELAYED RELEASE ORAL at 12:31

## 2024-01-01 RX ADMIN — PANTOPRAZOLE SODIUM 40 MILLIGRAM(S): 40 TABLET, DELAYED RELEASE ORAL at 06:48

## 2024-01-01 RX ADMIN — PANTOPRAZOLE SODIUM 40 MILLIGRAM(S): 40 TABLET, DELAYED RELEASE ORAL at 05:02

## 2024-01-01 RX ADMIN — Medication 2 MILLIGRAM(S): at 05:19

## 2024-01-01 RX ADMIN — CHLORHEXIDINE GLUCONATE 1 APPLICATION(S): 1.2 RINSE ORAL at 12:33

## 2024-01-01 RX ADMIN — Medication 5 MILLILITER(S): at 17:31

## 2024-01-01 RX ADMIN — Medication 5 MILLILITER(S): at 06:13

## 2024-01-01 RX ADMIN — Medication 5 MILLILITER(S): at 17:29

## 2024-01-01 RX ADMIN — Medication 2 MILLIGRAM(S): at 11:24

## 2024-01-01 RX ADMIN — Medication 2 MILLIGRAM(S): at 18:49

## 2024-01-01 RX ADMIN — Medication 1 SPRAY(S): at 05:46

## 2024-01-01 RX ADMIN — Medication 5 MILLILITER(S): at 23:06

## 2024-01-01 RX ADMIN — PIPERACILLIN SODIUM AND TAZOBACTAM SODIUM 25 GRAM(S): 4; .5 INJECTION, POWDER, LYOPHILIZED, FOR SOLUTION INTRAVENOUS at 14:25

## 2024-01-01 RX ADMIN — Medication 4 MILLIGRAM(S): at 05:09

## 2024-01-01 RX ADMIN — Medication 4 MILLIGRAM(S): at 22:32

## 2024-01-01 RX ADMIN — PANTOPRAZOLE SODIUM 40 MILLIGRAM(S): 40 TABLET, DELAYED RELEASE ORAL at 17:30

## 2024-01-01 RX ADMIN — PANTOPRAZOLE SODIUM 40 MILLIGRAM(S): 40 TABLET, DELAYED RELEASE ORAL at 12:33

## 2024-01-01 RX ADMIN — CHLORHEXIDINE GLUCONATE 1 APPLICATION(S): 1.2 RINSE ORAL at 11:26

## 2024-01-01 RX ADMIN — CHLORHEXIDINE GLUCONATE 1 APPLICATION(S): 1.2 RINSE ORAL at 13:18

## 2024-01-01 RX ADMIN — Medication 6 MILLIGRAM(S): at 12:46

## 2024-01-01 RX ADMIN — FUROSEMIDE 40 MILLIGRAM(S): 40 TABLET ORAL at 22:46

## 2024-01-01 RX ADMIN — PIPERACILLIN SODIUM AND TAZOBACTAM SODIUM 25 GRAM(S): 4; .5 INJECTION, POWDER, LYOPHILIZED, FOR SOLUTION INTRAVENOUS at 05:39

## 2024-01-01 RX ADMIN — PANTOPRAZOLE SODIUM 40 MILLIGRAM(S): 40 TABLET, DELAYED RELEASE ORAL at 05:19

## 2024-01-01 RX ADMIN — Medication 5 MILLILITER(S): at 18:34

## 2024-01-01 RX ADMIN — Medication 2 MILLIGRAM(S): at 11:39

## 2024-01-01 RX ADMIN — POTASSIUM CHLORIDE 100 MILLIEQUIVALENT(S): 600 TABLET, EXTENDED RELEASE ORAL at 22:46

## 2024-01-01 RX ADMIN — Medication 11 UNIT(S)/HR: at 23:27

## 2024-01-01 RX ADMIN — Medication 1 MG/HR: at 12:35

## 2024-01-01 RX ADMIN — Medication 5 MILLILITER(S): at 00:04

## 2024-01-01 RX ADMIN — ENOXAPARIN SODIUM 50 MILLIGRAM(S): 30 INJECTION SUBCUTANEOUS at 05:48

## 2024-01-01 RX ADMIN — Medication 5 MILLILITER(S): at 11:01

## 2024-01-01 RX ADMIN — PANTOPRAZOLE SODIUM 40 MILLIGRAM(S): 40 TABLET, DELAYED RELEASE ORAL at 17:42

## 2024-01-01 RX ADMIN — Medication 5 MILLILITER(S): at 05:21

## 2024-01-01 RX ADMIN — Medication 4 MILLIGRAM(S): at 13:35

## 2024-01-01 RX ADMIN — ENOXAPARIN SODIUM 50 MILLIGRAM(S): 30 INJECTION SUBCUTANEOUS at 18:18

## 2024-01-01 RX ADMIN — PANTOPRAZOLE SODIUM 40 MILLIGRAM(S): 40 TABLET, DELAYED RELEASE ORAL at 05:39

## 2024-01-01 RX ADMIN — PANTOPRAZOLE SODIUM 40 MILLIGRAM(S): 40 TABLET, DELAYED RELEASE ORAL at 17:48

## 2024-01-01 RX ADMIN — Medication 4 MILLIGRAM(S): at 22:03

## 2024-01-01 RX ADMIN — Medication 12 UNIT(S)/HR: at 07:37

## 2024-01-01 RX ADMIN — PANTOPRAZOLE SODIUM 40 MILLIGRAM(S): 40 TABLET, DELAYED RELEASE ORAL at 12:49

## 2024-01-01 RX ADMIN — PIPERACILLIN SODIUM AND TAZOBACTAM SODIUM 25 GRAM(S): 4; .5 INJECTION, POWDER, LYOPHILIZED, FOR SOLUTION INTRAVENOUS at 14:27

## 2024-01-01 RX ADMIN — Medication 2 MILLIGRAM(S): at 17:59

## 2024-01-01 RX ADMIN — Medication 2 MILLIGRAM(S): at 14:46

## 2024-01-01 RX ADMIN — POTASSIUM CHLORIDE 100 MILLIEQUIVALENT(S): 600 TABLET, EXTENDED RELEASE ORAL at 09:12

## 2024-01-01 RX ADMIN — POTASSIUM CHLORIDE 100 MILLIEQUIVALENT(S): 600 TABLET, EXTENDED RELEASE ORAL at 20:30

## 2024-01-01 RX ADMIN — Medication 2 MILLIGRAM(S): at 17:28

## 2024-01-01 RX ADMIN — Medication 1 MG/HR: at 18:26

## 2024-01-01 RX ADMIN — PIPERACILLIN SODIUM AND TAZOBACTAM SODIUM 25 GRAM(S): 4; .5 INJECTION, POWDER, LYOPHILIZED, FOR SOLUTION INTRAVENOUS at 13:35

## 2024-01-01 RX ADMIN — Medication 5 MILLILITER(S): at 11:44

## 2024-01-01 RX ADMIN — Medication 4 MILLIGRAM(S): at 13:43

## 2024-01-01 RX ADMIN — Medication 2 MILLIGRAM(S): at 11:12

## 2024-01-01 RX ADMIN — Medication 12 UNIT(S)/HR: at 13:36

## 2024-01-01 RX ADMIN — PANTOPRAZOLE SODIUM 40 MILLIGRAM(S): 40 TABLET, DELAYED RELEASE ORAL at 05:22

## 2024-01-01 RX ADMIN — Medication 2 MILLIGRAM(S): at 11:16

## 2024-01-01 RX ADMIN — POTASSIUM CHLORIDE 100 MILLIEQUIVALENT(S): 600 TABLET, EXTENDED RELEASE ORAL at 04:27

## 2024-01-01 RX ADMIN — Medication 2 MILLIGRAM(S): at 15:35

## 2024-01-01 RX ADMIN — Medication 11 UNIT(S)/HR: at 06:01

## 2024-01-01 RX ADMIN — PANTOPRAZOLE SODIUM 40 MILLIGRAM(S): 40 TABLET, DELAYED RELEASE ORAL at 06:53

## 2024-01-01 RX ADMIN — CHLORHEXIDINE GLUCONATE 1 APPLICATION(S): 1.2 RINSE ORAL at 12:35

## 2024-01-01 RX ADMIN — PIPERACILLIN SODIUM AND TAZOBACTAM SODIUM 25 GRAM(S): 4; .5 INJECTION, POWDER, LYOPHILIZED, FOR SOLUTION INTRAVENOUS at 21:55

## 2024-01-01 RX ADMIN — Medication 5 MILLILITER(S): at 05:19

## 2024-01-01 RX ADMIN — Medication 5 MILLILITER(S): at 18:48

## 2024-01-01 RX ADMIN — Medication 4 MILLIGRAM(S): at 10:55

## 2024-01-01 RX ADMIN — ENOXAPARIN SODIUM 50 MILLIGRAM(S): 30 INJECTION SUBCUTANEOUS at 18:26

## 2024-01-01 RX ADMIN — Medication 2 MILLIGRAM(S): at 02:20

## 2024-01-01 RX ADMIN — FENTANYL 25 MICROGRAM(S): 12 PATCH, EXTENDED RELEASE TRANSDERMAL at 11:13

## 2024-01-01 RX ADMIN — Medication 12 UNIT(S)/HR: at 19:40

## 2024-01-01 RX ADMIN — Medication 2 MILLIGRAM(S): at 07:18

## 2024-01-01 RX ADMIN — CHLORHEXIDINE GLUCONATE 1 APPLICATION(S): 1.2 RINSE ORAL at 22:01

## 2024-01-01 RX ADMIN — Medication 5 MILLILITER(S): at 22:44

## 2024-01-01 RX ADMIN — FENTANYL 25 MICROGRAM(S): 12 PATCH, EXTENDED RELEASE TRANSDERMAL at 11:15

## 2024-01-01 RX ADMIN — ENOXAPARIN SODIUM 50 MILLIGRAM(S): 30 INJECTION SUBCUTANEOUS at 05:12

## 2024-01-01 RX ADMIN — SODIUM CHLORIDE 10 MILLILITER(S): 9 INJECTION, SOLUTION INTRAMUSCULAR; INTRAVENOUS; SUBCUTANEOUS at 19:14

## 2024-01-01 RX ADMIN — FUROSEMIDE 20 MILLIGRAM(S): 40 TABLET ORAL at 22:06

## 2024-01-01 RX ADMIN — Medication 2 MILLIGRAM(S): at 18:14

## 2024-11-01 ENCOUNTER — INPATIENT (INPATIENT)
Facility: HOSPITAL | Age: 74
LOS: 2 days | Discharge: HOME | DRG: 812 | End: 2024-11-04
Attending: INTERNAL MEDICINE | Admitting: INTERNAL MEDICINE
Payer: MEDICARE

## 2024-11-01 VITALS
RESPIRATION RATE: 17 BRPM | SYSTOLIC BLOOD PRESSURE: 121 MMHG | OXYGEN SATURATION: 93 % | TEMPERATURE: 98 F | DIASTOLIC BLOOD PRESSURE: 75 MMHG | HEART RATE: 96 BPM | HEIGHT: 59 IN | WEIGHT: 104.94 LBS

## 2024-11-01 DIAGNOSIS — I10 ESSENTIAL (PRIMARY) HYPERTENSION: ICD-10-CM

## 2024-11-01 DIAGNOSIS — D64.9 ANEMIA, UNSPECIFIED: ICD-10-CM

## 2024-11-01 DIAGNOSIS — Z29.9 ENCOUNTER FOR PROPHYLACTIC MEASURES, UNSPECIFIED: ICD-10-CM

## 2024-11-01 DIAGNOSIS — I82.409 ACUTE EMBOLISM AND THROMBOSIS OF UNSPECIFIED DEEP VEINS OF UNSPECIFIED LOWER EXTREMITY: ICD-10-CM

## 2024-11-01 DIAGNOSIS — C25.9 MALIGNANT NEOPLASM OF PANCREAS, UNSPECIFIED: ICD-10-CM

## 2024-11-01 LAB
ALBUMIN SERPL ELPH-MCNC: 2 G/DL — LOW (ref 3.5–5)
ALP SERPL-CCNC: 260 U/L — HIGH (ref 40–120)
ALT FLD-CCNC: 25 U/L DA — SIGNIFICANT CHANGE UP (ref 10–60)
ANION GAP SERPL CALC-SCNC: 3 MMOL/L — LOW (ref 5–17)
APTT BLD: 37.6 SEC — HIGH (ref 24.5–35.6)
AST SERPL-CCNC: 21 U/L — SIGNIFICANT CHANGE UP (ref 10–40)
BASOPHILS # BLD AUTO: 0.04 K/UL — SIGNIFICANT CHANGE UP (ref 0–0.2)
BASOPHILS NFR BLD AUTO: 0.3 % — SIGNIFICANT CHANGE UP (ref 0–2)
BILIRUB SERPL-MCNC: 0.8 MG/DL — SIGNIFICANT CHANGE UP (ref 0.2–1.2)
BLD GP AB SCN SERPL QL: SIGNIFICANT CHANGE UP
BUN SERPL-MCNC: 18 MG/DL — SIGNIFICANT CHANGE UP (ref 7–18)
CALCIUM SERPL-MCNC: 8.3 MG/DL — LOW (ref 8.4–10.5)
CHLORIDE SERPL-SCNC: 114 MMOL/L — HIGH (ref 96–108)
CO2 SERPL-SCNC: 26 MMOL/L — SIGNIFICANT CHANGE UP (ref 22–31)
CREAT SERPL-MCNC: 0.57 MG/DL — SIGNIFICANT CHANGE UP (ref 0.5–1.3)
EGFR: 95 ML/MIN/1.73M2 — SIGNIFICANT CHANGE UP
EOSINOPHIL # BLD AUTO: 0.09 K/UL — SIGNIFICANT CHANGE UP (ref 0–0.5)
EOSINOPHIL NFR BLD AUTO: 0.7 % — SIGNIFICANT CHANGE UP (ref 0–6)
GLUCOSE SERPL-MCNC: 107 MG/DL — HIGH (ref 70–99)
HCT VFR BLD CALC: 22.1 % — LOW (ref 34.5–45)
HCT VFR BLD CALC: 22.8 % — LOW (ref 34.5–45)
HGB BLD-MCNC: 6.9 G/DL — CRITICAL LOW (ref 11.5–15.5)
HGB BLD-MCNC: 7.4 G/DL — LOW (ref 11.5–15.5)
IMM GRANULOCYTES NFR BLD AUTO: 0.8 % — SIGNIFICANT CHANGE UP (ref 0–0.9)
INR BLD: 3.35 RATIO — HIGH (ref 0.85–1.16)
LYMPHOCYTES # BLD AUTO: 0.55 K/UL — LOW (ref 1–3.3)
LYMPHOCYTES # BLD AUTO: 4.5 % — LOW (ref 13–44)
MCHC RBC-ENTMCNC: 26.7 PG — LOW (ref 27–34)
MCHC RBC-ENTMCNC: 27.3 PG — SIGNIFICANT CHANGE UP (ref 27–34)
MCHC RBC-ENTMCNC: 31.2 G/DL — LOW (ref 32–36)
MCHC RBC-ENTMCNC: 32.5 G/DL — SIGNIFICANT CHANGE UP (ref 32–36)
MCV RBC AUTO: 84.1 FL — SIGNIFICANT CHANGE UP (ref 80–100)
MCV RBC AUTO: 85.7 FL — SIGNIFICANT CHANGE UP (ref 80–100)
MONOCYTES # BLD AUTO: 0.94 K/UL — HIGH (ref 0–0.9)
MONOCYTES NFR BLD AUTO: 7.6 % — SIGNIFICANT CHANGE UP (ref 2–14)
NEUTROPHILS # BLD AUTO: 10.63 K/UL — HIGH (ref 1.8–7.4)
NEUTROPHILS NFR BLD AUTO: 86.1 % — HIGH (ref 43–77)
NRBC # BLD: 0 /100 WBCS — SIGNIFICANT CHANGE UP (ref 0–0)
NRBC # BLD: 0 /100 WBCS — SIGNIFICANT CHANGE UP (ref 0–0)
PLATELET # BLD AUTO: 276 K/UL — SIGNIFICANT CHANGE UP (ref 150–400)
PLATELET # BLD AUTO: 331 K/UL — SIGNIFICANT CHANGE UP (ref 150–400)
POTASSIUM SERPL-MCNC: 3.8 MMOL/L — SIGNIFICANT CHANGE UP (ref 3.5–5.3)
POTASSIUM SERPL-SCNC: 3.8 MMOL/L — SIGNIFICANT CHANGE UP (ref 3.5–5.3)
PROT SERPL-MCNC: 7 G/DL — SIGNIFICANT CHANGE UP (ref 6–8.3)
PROTHROM AB SERPL-ACNC: 38.4 SEC — HIGH (ref 9.9–13.4)
RBC # BLD: 2.58 M/UL — LOW (ref 3.8–5.2)
RBC # BLD: 2.71 M/UL — LOW (ref 3.8–5.2)
RBC # FLD: 17.9 % — HIGH (ref 10.3–14.5)
RBC # FLD: 18.9 % — HIGH (ref 10.3–14.5)
SODIUM SERPL-SCNC: 143 MMOL/L — SIGNIFICANT CHANGE UP (ref 135–145)
WBC # BLD: 12.35 K/UL — HIGH (ref 3.8–10.5)
WBC # BLD: 9.85 K/UL — SIGNIFICANT CHANGE UP (ref 3.8–10.5)
WBC # FLD AUTO: 12.35 K/UL — HIGH (ref 3.8–10.5)
WBC # FLD AUTO: 9.85 K/UL — SIGNIFICANT CHANGE UP (ref 3.8–10.5)

## 2024-11-01 PROCEDURE — 99285 EMERGENCY DEPT VISIT HI MDM: CPT

## 2024-11-01 RX ORDER — ERYTHROPOIETIN 10000 [IU]/ML
40000 INJECTION, SOLUTION INTRAVENOUS; SUBCUTANEOUS
Refills: 0 | DISCHARGE

## 2024-11-01 RX ORDER — MELATONIN 5 MG
3 TABLET ORAL AT BEDTIME
Refills: 0 | Status: DISCONTINUED | OUTPATIENT
Start: 2024-11-01 | End: 2024-11-04

## 2024-11-01 RX ORDER — FUROSEMIDE 40 MG
20 TABLET ORAL DAILY
Refills: 0 | Status: DISCONTINUED | OUTPATIENT
Start: 2024-11-01 | End: 2024-11-01

## 2024-11-01 RX ORDER — PACLITAXEL 100 MG/20ML
121 INJECTION, POWDER, LYOPHILIZED, FOR SUSPENSION INTRAVENOUS
Refills: 0 | DISCHARGE

## 2024-11-01 RX ORDER — DARBEPOETIN ALFA 40 UG/.4ML
200 INJECTION, SOLUTION INTRAVENOUS; SUBCUTANEOUS
Refills: 0 | DISCHARGE

## 2024-11-01 RX ORDER — PANCRELIPASE 16800; 98400; 56800 [USP'U]/1; [USP'U]/1; [USP'U]/1
1 CAPSULE, DELAYED RELEASE ORAL
Refills: 0 | Status: DISCONTINUED | OUTPATIENT
Start: 2024-11-01 | End: 2024-11-01

## 2024-11-01 RX ORDER — FOSNETUPITANT AND PALONOSETRON 260; .28 MG/20ML; MG/20ML
0.25 INJECTION INTRAVENOUS
Refills: 0 | DISCHARGE

## 2024-11-01 RX ORDER — ACETAMINOPHEN 500 MG
650 TABLET ORAL EVERY 6 HOURS
Refills: 0 | Status: DISCONTINUED | OUTPATIENT
Start: 2024-11-01 | End: 2024-11-04

## 2024-11-01 RX ORDER — PANCRELIPASE 16800; 98400; 56800 [USP'U]/1; [USP'U]/1; [USP'U]/1
1 CAPSULE, DELAYED RELEASE ORAL
Refills: 0 | DISCHARGE

## 2024-11-01 RX ORDER — DARBEPOETIN ALFA 40 UG/.4ML
300 INJECTION, SOLUTION INTRAVENOUS; SUBCUTANEOUS
Refills: 0 | DISCHARGE

## 2024-11-01 RX ORDER — MEGESTROL ACETATE 40 MG/ML
200 SUSPENSION ORAL DAILY
Refills: 0 | Status: DISCONTINUED | OUTPATIENT
Start: 2024-11-01 | End: 2024-11-01

## 2024-11-01 RX ORDER — PANTOPRAZOLE SODIUM 40 MG/1
40 TABLET, DELAYED RELEASE ORAL EVERY 12 HOURS
Refills: 0 | Status: DISCONTINUED | OUTPATIENT
Start: 2024-11-01 | End: 2024-11-04

## 2024-11-01 RX ORDER — PEGFILGRASTIM-CBQV 6 MG/.6ML
6 INJECTION, SOLUTION SUBCUTANEOUS
Refills: 0 | DISCHARGE

## 2024-11-01 RX ORDER — MEGESTROL ACETATE 40 MG/ML
5 SUSPENSION ORAL
Refills: 0 | DISCHARGE

## 2024-11-01 RX ORDER — MAGNESIUM, ALUMINUM HYDROXIDE 200-200 MG
30 TABLET,CHEWABLE ORAL EVERY 4 HOURS
Refills: 0 | Status: DISCONTINUED | OUTPATIENT
Start: 2024-11-01 | End: 2024-11-04

## 2024-11-01 RX ORDER — ONDANSETRON HYDROCHLORIDE 2 MG/ML
4 INJECTION, SOLUTION INTRAMUSCULAR; INTRAVENOUS EVERY 8 HOURS
Refills: 0 | Status: DISCONTINUED | OUTPATIENT
Start: 2024-11-01 | End: 2024-11-04

## 2024-11-01 RX ORDER — IRON SUCROSE 20 MG/ML
100 INJECTION, SOLUTION INTRAVENOUS
Refills: 0 | DISCHARGE

## 2024-11-01 NOTE — H&P ADULT - NSHPPHYSICALEXAM_GEN_ALL_CORE
GENERAL: NAD, lying in bed comfortably  HEAD:  Atraumatic, Normocephalic  EYES: EOMI, PERRLA, conjunctiva and sclera clear  ENT: Moist mucous membranes  NECK: Supple, No JVD  CHEST/LUNG: Clear to auscultation bilaterally; No rales, rhonchi, wheezing, or rubs. Unlabored respirations  HEART: Regular rate and rhythm; No murmurs, rubs, or gallops  ABDOMEN: Diffuse abdominal tenderness. SULEMA negative, no stool   EXTREMITIES: Minimal swelling of left leg  NERVOUS SYSTEM:  Alert & Oriented X3, speech clear. No deficits   MSK: FROM all 4 extremities, full and equal strength    Vital Signs Last 24 Hrs  T(C): 36.6 (01 Nov 2024 18:50), Max: 36.9 (01 Nov 2024 15:40)  T(F): 97.9 (01 Nov 2024 18:50), Max: 98.4 (01 Nov 2024 15:40)  HR: 77 (01 Nov 2024 18:50) (77 - 99)  BP: 121/61 (01 Nov 2024 18:50) (104/59 - 122/59)  BP(mean): --  RR: 18 (01 Nov 2024 18:50) (16 - 20)  SpO2: 95% (01 Nov 2024 18:50) (93% - 97%)    Parameters below as of 01 Nov 2024 18:50  Patient On (Oxygen Delivery Method): room air      SKIN: No rashes or lesions

## 2024-11-01 NOTE — ED ADULT NURSE NOTE - OBJECTIVE STATEMENT
Patient is sent from Cancer center for low hemoglobin 6.3. patient denies chest pain, denies N/V/D, no SOB

## 2024-11-01 NOTE — ED PROVIDER NOTE - OBJECTIVE STATEMENT
Patient sent by Dr Posey for anemia to 6.3, endorsing weakness/malaise Patient sent by Dr Posey for anemia to 6.3, endorsing weakness/malaise, history of chronic anemia, just started on xarelto as outpatient

## 2024-11-01 NOTE — H&P ADULT - ATTENDING COMMENTS
74 year old female, from home, ambulates independently at baseline, with PMHx significant for pancreatic cancer, DVT and HTN presents to the ED for symptomatic anemia requiring PRBC. Collateral is obtained from  at bedside.  reports that patient was sent by outpatient oncologist after she was found to have low hemoglobin in outpatient labs. Patient endorses generalized weakness and fatigue over the past weeks. When questioned by resident, patient denies melena, hematuria, nosebleeds and all other forms of bleeding. Patient states that she recently noted swelling of the lower extremity and was started on Eliquis for DVT. Patient and  are not aware of any ultrasound exams prior to being started on Eliquis. Patient denies fevers, chills, nausea, vomiting and changes to bowel movements and urination.      .    assessment  --  symptomatic anemia, h/o pancreatic cancer, DVT and HTN    plan  --  admit to med, transfuse 1 unit prbc, cont preadmit home meds, gi and dvt prophylaxis  cbc, bmp, mg, phos, lipids, tsh,         gi cons  heme onc cons ,

## 2024-11-01 NOTE — ED PROVIDER NOTE - PROGRESS NOTE DETAILS
Discussed case with sending oncologist Dr. Nii Posey who recommended admission for monitoring hemoglobin level giving starting anticoagulation recently

## 2024-11-01 NOTE — ED PROVIDER NOTE - CLINICAL SUMMARY MEDICAL DECISION MAKING FREE TEXT BOX
Patient sent from oncology offices for worsening anemia.  Will repeat labs and discussed with sending physician.

## 2024-11-01 NOTE — ED PROVIDER NOTE - PHYSICAL EXAMINATION
Exam:  General: Patient pale appearing, vital signs within normal limits  HEENT: airway patent with moist mucous membranes  Cardiac: RRR S1/S2 with strong peripheral pulses  Respiratory: lungs clear without respiratory distress  GI: abdomen soft, non tender, non distended  Neuro: no gross neurologic deficits  Skin: warm, well perfused  Psych: normal mood and affect Head is atraumatic. Head shape is symmetrical. Exam:  General: Patient pale appearing, vital signs within normal limits  HEENT: airway patent with moist mucous membranes  Cardiac: RRR S1/S2 with strong peripheral pulses  Respiratory: lungs clear without respiratory distress  GI: abdomen soft, non tender, non distended Exam:  General: Patient pale appearing, vital signs within normal limits  HEENT: airway patent with moist mucous membranes  Cardiac: RRR S1/S2 with strong peripheral pulses  Respiratory: no respiratory distress

## 2024-11-01 NOTE — H&P ADULT - NSHPREVIEWOFSYSTEMS_GEN_ALL_CORE
REVIEW OF SYSTEMS:    CONSTITUTIONAL: No weakness, fevers or chills  EYES/ENT: No visual changes;  No vertigo or throat pain   NECK: No pain or stiffness  RESPIRATORY: No cough, wheezing, hemoptysis; No shortness of breath  CARDIOVASCULAR: No chest pain, palpitations  GASTROINTESTINAL: Diffuse abdominal pain. No nausea, vomiting, or hematemesis; No diarrhea or constipation. No melena or hematochezia.  GENITOURINARY: No dysuria, frequency or hematuria  NEUROLOGICAL: No numbness or weakness  SKIN: No itching, burning, rashes, or lesions   All other review of systems is negative unless indicated above. REVIEW OF SYSTEMS:    CONSTITUTIONAL: Generalized weakness  EYES/ENT: No visual changes;  No vertigo or throat pain   NECK: No pain or stiffness  RESPIRATORY: No cough, wheezing, hemoptysis; No shortness of breath  CARDIOVASCULAR: No chest pain, palpitations  GASTROINTESTINAL: Diffuse abdominal pain. No nausea, vomiting, or hematemesis; No diarrhea or constipation. No melena or hematochezia.  GENITOURINARY: No dysuria, frequency or hematuria  NEUROLOGICAL: No numbness or weakness  SKIN: No itching, burning, rashes, or lesions   All other review of systems is negative unless indicated above.

## 2024-11-01 NOTE — H&P ADULT - HISTORY OF PRESENT ILLNESS
Patient is a 74 year old female, from home, ambulates independently at baseline, with PMHx significant for pancreatic cancer, DVT and HTN presents to the ED for symptomatic anemia requiring PRBC. Collateral is obtained from  at bedside.  reports that patient was sent by outpatient oncologist after she was found to have low hemoglobin in outpatient labs. Patient endorses generalized weakness and fatigue over the past weeks. When questioned by resident, patient denies melena, hematuria, nosebleeds and all other forms of bleeding. Patient states that she recently noted swelling of the lower extremity and was started on Eliquis for DVT. Patient and  are not aware of any ultrasound exams prior to being started on Eliquis. Patient denies fevers, chills, nausea, vomiting and changes to bowel movements and urination.

## 2024-11-01 NOTE — H&P ADULT - ASSESSMENT
Patient is a 74 year old female with PMHx significant for pancreatic cancer, DVT and HTN presents to the ED for symptomatic anemia requiring PRBC. Patient is being admitted for anemia and GI bleed rule out.

## 2024-11-02 LAB
ALBUMIN SERPL ELPH-MCNC: 1.6 G/DL — LOW (ref 3.5–5)
ALP SERPL-CCNC: 230 U/L — HIGH (ref 40–120)
ALT FLD-CCNC: 23 U/L DA — SIGNIFICANT CHANGE UP (ref 10–60)
ANION GAP SERPL CALC-SCNC: 8 MMOL/L — SIGNIFICANT CHANGE UP (ref 5–17)
AST SERPL-CCNC: 29 U/L — SIGNIFICANT CHANGE UP (ref 10–40)
BILIRUB SERPL-MCNC: 1.1 MG/DL — SIGNIFICANT CHANGE UP (ref 0.2–1.2)
BUN SERPL-MCNC: 18 MG/DL — SIGNIFICANT CHANGE UP (ref 7–18)
CALCIUM SERPL-MCNC: 8.3 MG/DL — LOW (ref 8.4–10.5)
CHLORIDE SERPL-SCNC: 114 MMOL/L — HIGH (ref 96–108)
CO2 SERPL-SCNC: 22 MMOL/L — SIGNIFICANT CHANGE UP (ref 22–31)
CREAT SERPL-MCNC: 0.48 MG/DL — LOW (ref 0.5–1.3)
EGFR: 99 ML/MIN/1.73M2 — SIGNIFICANT CHANGE UP
GLUCOSE SERPL-MCNC: 55 MG/DL — LOW (ref 70–99)
HCT VFR BLD CALC: 25.8 % — LOW (ref 34.5–45)
HCT VFR BLD CALC: 27.5 % — LOW (ref 34.5–45)
HGB BLD-MCNC: 8.2 G/DL — LOW (ref 11.5–15.5)
HGB BLD-MCNC: 8.7 G/DL — LOW (ref 11.5–15.5)
MAGNESIUM SERPL-MCNC: 2.4 MG/DL — SIGNIFICANT CHANGE UP (ref 1.6–2.6)
MCHC RBC-ENTMCNC: 26.5 PG — LOW (ref 27–34)
MCHC RBC-ENTMCNC: 26.6 PG — LOW (ref 27–34)
MCHC RBC-ENTMCNC: 31.6 G/DL — LOW (ref 32–36)
MCHC RBC-ENTMCNC: 31.8 G/DL — LOW (ref 32–36)
MCV RBC AUTO: 83.2 FL — SIGNIFICANT CHANGE UP (ref 80–100)
MCV RBC AUTO: 84.1 FL — SIGNIFICANT CHANGE UP (ref 80–100)
NRBC # BLD: 0 /100 WBCS — SIGNIFICANT CHANGE UP (ref 0–0)
NRBC # BLD: 0 /100 WBCS — SIGNIFICANT CHANGE UP (ref 0–0)
PHOSPHATE SERPL-MCNC: 4 MG/DL — SIGNIFICANT CHANGE UP (ref 2.5–4.5)
PLATELET # BLD AUTO: 320 K/UL — SIGNIFICANT CHANGE UP (ref 150–400)
PLATELET # BLD AUTO: 386 K/UL — SIGNIFICANT CHANGE UP (ref 150–400)
POTASSIUM SERPL-MCNC: 4 MMOL/L — SIGNIFICANT CHANGE UP (ref 3.5–5.3)
POTASSIUM SERPL-SCNC: 4 MMOL/L — SIGNIFICANT CHANGE UP (ref 3.5–5.3)
PROT SERPL-MCNC: 6.2 G/DL — SIGNIFICANT CHANGE UP (ref 6–8.3)
RBC # BLD: 3.1 M/UL — LOW (ref 3.8–5.2)
RBC # BLD: 3.27 M/UL — LOW (ref 3.8–5.2)
RBC # FLD: 18 % — HIGH (ref 10.3–14.5)
RBC # FLD: 18.2 % — HIGH (ref 10.3–14.5)
SODIUM SERPL-SCNC: 144 MMOL/L — SIGNIFICANT CHANGE UP (ref 135–145)
WBC # BLD: 10.35 K/UL — SIGNIFICANT CHANGE UP (ref 3.8–10.5)
WBC # BLD: 12.26 K/UL — HIGH (ref 3.8–10.5)
WBC # FLD AUTO: 10.35 K/UL — SIGNIFICANT CHANGE UP (ref 3.8–10.5)
WBC # FLD AUTO: 12.26 K/UL — HIGH (ref 3.8–10.5)

## 2024-11-02 PROCEDURE — 93970 EXTREMITY STUDY: CPT | Mod: 26

## 2024-11-02 RX ORDER — ENOXAPARIN SODIUM 40MG/0.4ML
40 SYRINGE (ML) SUBCUTANEOUS EVERY 12 HOURS
Refills: 0 | Status: DISCONTINUED | OUTPATIENT
Start: 2024-11-02 | End: 2024-11-03

## 2024-11-02 RX ADMIN — PANTOPRAZOLE SODIUM 40 MILLIGRAM(S): 40 TABLET, DELAYED RELEASE ORAL at 18:00

## 2024-11-02 RX ADMIN — Medication 40 MILLIGRAM(S): at 21:33

## 2024-11-02 RX ADMIN — Medication 40 MILLIGRAM(S): at 09:10

## 2024-11-02 RX ADMIN — PANTOPRAZOLE SODIUM 40 MILLIGRAM(S): 40 TABLET, DELAYED RELEASE ORAL at 06:03

## 2024-11-02 NOTE — PROGRESS NOTE ADULT - SUBJECTIVE AND OBJECTIVE BOX
Heme/Onc Progress Note     HPI: Pt is mandarin speaking, translation provided by her grandson. 73 yo female who follows with Dr. Posey for her diagnosis of metastatic pancreatic cancer on gem/abraxane (LD on 10/18/24), recent diagnosis of b/l LE DVT on 10/28 for which she was started on eliquis, h/o GIB, was seen by Dr. Posey on 11/1 and found to have severe anemia with HB 6.3. Sent to [facility] for further evaluation. On admission had a Hb 6.9 s/p 1 unit PRBC. She does report black colored stools as well as bright red blood. No chest pain, sob, dizziness or palpitations. No abdominal pain, n.v.       B/L LE Doppler 10/28/24  IMPRESSION:  DVT within the right femoral vein and bilateral peroneal veins as above.  Right soleal vein and left superficial saphenous vein thrombus.        Vital Signs Last 24 Hrs  T(C): 36.8 (02 Nov 2024 13:53), Max: 37 (01 Nov 2024 22:44)  T(F): 98.2 (02 Nov 2024 13:53), Max: 98.6 (01 Nov 2024 22:44)  HR: 79 (02 Nov 2024 13:53) (70 - 97)  BP: 136/69 (02 Nov 2024 13:53) (111/54 - 136/69)  BP(mean): 74 (02 Nov 2024 05:03) (74 - 74)  RR: 17 (02 Nov 2024 13:53) (17 - 20)  SpO2: 94% (02 Nov 2024 13:53) (94% - 97%)      Physical Exam:     Constitutional: in NAD  Cardiac: RRR  Respiratory: CTAB  Gastrointestinal: Soft, non-tender  Extremities: No lower extremity edema       Medications:   MEDICATIONS  (STANDING):  enoxaparin Injectable 40 milliGRAM(s) SubCutaneous every 12 hours  pantoprazole  Injectable 40 milliGRAM(s) IV Push every 12 hours    MEDICATIONS  (PRN):  acetaminophen     Tablet .. 650 milliGRAM(s) Oral every 6 hours PRN Temp greater or equal to 38C (100.4F), Mild Pain (1 - 3)  aluminum hydroxide/magnesium hydroxide/simethicone Suspension 30 milliLiter(s) Oral every 4 hours PRN Dyspepsia  melatonin 3 milliGRAM(s) Oral at bedtime PRN Insomnia  ondansetron Injectable 4 milliGRAM(s) IV Push every 8 hours PRN Nausea and/or Vomiting        CBC Full  -  ( 02 Nov 2024 13:15 )  WBC Count : 12.26 K/uL  RBC Count : 3.27 M/uL  Hemoglobin : 8.7 g/dL  Hematocrit : 27.5 %  Platelet Count - Automated : 386 K/uL  Mean Cell Volume : 84.1 fl  Mean Cell Hemoglobin : 26.6 pg  Mean Cell Hemoglobin Concentration : 31.6 g/dL  Auto Neutrophil # : x  Auto Lymphocyte # : x  Auto Monocyte # : x  Auto Eosinophil # : x  Auto Basophil # : x  Auto Neutrophil % : x  Auto Lymphocyte % : x  Auto Monocyte % : x  Auto Eosinophil % : x  Auto Basophil % : x      11-02    144  |  114[H]  |  18  ----------------------------<  55[L]  4.0   |  22  |  0.48[L]    Ca    8.3[L]      02 Nov 2024 05:55  Phos  4.0     11-02  Mg     2.4     11-02    TPro  6.2  /  Alb  1.6[L]  /  TBili  1.1  /  DBili  x   /  AST  29  /  ALT  23  /  AlkPhos  230[H]  11-02      PT/INR - ( 01 Nov 2024 12:00 )   PT: 38.4 sec;   INR: 3.35 ratio      PTT - ( 01 Nov 2024 12:00 )  PTT:37.6 sec

## 2024-11-02 NOTE — CONSULT NOTE ADULT - NEGATIVE OPHTHALMOLOGIC SYMPTOMS
no diplopia/no photophobia/no lacrimation L/no lacrimation R/no blurred vision L/no blurred vision R/no discharge L/no discharge R/no pain L/no pain R/no irritation L/no irritation R/no scleral injection R

## 2024-11-02 NOTE — CONSULT NOTE ADULT - ASSESSMENT
1. Anemia (etiology multifactorial)  2. No evidence of acute GI bleeding  3. R/o chronic GI bleeding  4. Constipation  5. Pancreatic cancer    Suggestions:    1. Monitor H/H  2. Transfuse PRBC as needed  3. Avoid NSAID  4. Protonix 40mg daily  5. CT-Scan of abdomen and pelvis  6. Diet as tolerated  7. Daily stool softener  8. Check stool for occult blood  9. Hem-oncology evaluation  10. DVT prophylaxis  9.

## 2024-11-02 NOTE — CONSULT NOTE ADULT - SUBJECTIVE AND OBJECTIVE BOX
[  ] STAT REQUEST              [ X ] ROUTINE REQUEST    Patient is a 74 year old female with sever symptomatic anemia. GI consulted to evaluate.        HPI:  Patient is a 74 year old female, from home, ambulates independently at baseline, with past medical history significant for pancreatic cancer, DVT and HTN presented to the emergency room with sever symptomatic anemia associated with one week history of generalized weakness and fatigue. Patient also reported recently started being treated for DVT with Eliquis. Patient c/o worsening constipation, poor appetite and weight loss but denies abdominal pain, nausea, vomiting, hematemesis, hematochezia, melena, epistaxis, hemoptysis, cough, hematuria, dysuria or diarrhea.         PAIN MANAGEMENT:  Pain Scale:                 0/10  Pain Location:         PAST MEDICAL HISTORY    Pancreatic cancer    HTN (hypertension)    Anemia    DVT        PAST SURGICAL HISTORY    No significant surgical history reported      Allergies    No Known Allergies    Intolerances  None         MEDICATIONS  (STANDING):  enoxaparin Injectable 40 milliGRAM(s) SubCutaneous every 12 hours  pantoprazole  Injectable 40 milliGRAM(s) IV Push every 12 hours    MEDICATIONS  (PRN):  acetaminophen     Tablet .. 650 milliGRAM(s) Oral every 6 hours PRN Temp greater or equal to 38C (100.4F), Mild Pain (1 - 3)  aluminum hydroxide/magnesium hydroxide/simethicone Suspension 30 milliLiter(s) Oral every 4 hours PRN Dyspepsia  melatonin 3 milliGRAM(s) Oral at bedtime PRN Insomnia  ondansetron Injectable 4 milliGRAM(s) IV Push every 8 hours PRN Nausea and/or Vomiting      SOCIAL HISTORY  Advanced Directives:       [ X ] Full Code       [  ] DNR  Marital Status:         [X  ] M      [  ] S      [  ] D       [  ] W  Children:       [ X ] Yes      [  ] No  Occupation:        [  ] Employed       [ X ] Unemployed       [  ] Retired  Diet:       [ X ] Regular       [  ] PEG feeding          [  ] NG tube feeding  Drug Use:           [ X ] No                [  ] Yes  Alcohol:           [ X ] No             [  ] Yes (socially)         [  ] Yes (chronic)  Tobacco:           [  ] Yes           [ X ] No      FAMILY HISTORY  [X  ] Heart Disease            [X  ] Diabetes             [ X ] HTN             [  ] Colon Cancer             [  ] Stomach Cancer              [  ] Pancreatic Cancer      VITAL SIGNS   Vital Signs Last 24 Hrs  T(C): 36.6 (11-02-24 @ 05:03), Max: 37 (11-01-24 @ 22:44)  T(F): 97.9 (11-02-24 @ 05:03), Max: 98.6 (11-01-24 @ 22:44)  HR: 83 (11-02-24 @ 05:03) (70 - 97)  BP: 117/56 (11-02-24 @ 05:03) (104/59 - 122/59)  BP(mean): 74 (11-02-24 @ 05:03) (74 - 74)  RR: 17 (11-02-24 @ 05:03) (17 - 20)  SpO2: 96% (11-02-24 @ 05:03) (95% - 97%)         CBC Full  -  ( 02 Nov 2024 05:55 )  WBC Count : 10.35 K/uL  RBC Count : 3.10 M/uL  Hemoglobin : 8.2 g/dL  Hematocrit : 25.8 %  Platelet Count - Automated : 320 K/uL  Mean Cell Volume : 83.2 fl  Mean Cell Hemoglobin : 26.5 pg  Mean Cell Hemoglobin Concentration : 31.8 g/dL  Auto Neutrophil # : x  Auto Lymphocyte # : x  Auto Monocyte # : x  Auto Eosinophil # : x  Auto Basophil # : x  Auto Neutrophil % : x  Auto Lymphocyte % : x  Auto Monocyte % : x  Auto Eosinophil % : x  Auto Basophil % : x      11-02    144  |  114[H]  |  18  ----------------------------<  55[L]  4.0   |  22  |  0.48[L]    Ca    8.3[L]      02 Nov 2024 05:55  Phos  4.0     11-02  Mg     2.4     11-02    TPro  6.2  /  Alb  1.6[L]  /  TBili  1.1  /  DBili  x   /  AST  29  /  ALT  23  /  AlkPhos  230[H]  11-02      PT/INR - ( 01 Nov 2024 12:00 )   PT: 38.4 sec;   INR: 3.35 ratio       PTT - ( 01 Nov 2024 12:00 )  PTT:37.6 sec

## 2024-11-02 NOTE — CONSULT NOTE ADULT - RESPIRATORY
[FreeTextEntry1] : 54 yo F with PMH listed presented to clinic to establish care at a new facility.\par \par IMPRESSION:\par \par # Hyperglycemia:\par - Hemoglobin A1c, f/u\par \par # Endometrial Thickening:\par - Follow up with GYN for ultrasound \par \par # HCM:\par - Colonoscopy, schedule and for GI f/u \par - Mammogram\par - GYN follow up  for pap smear\par - Flu vaccination\par - Annual Labs and screening \par \par f/u in one month for Hyperglycemia 
clear to auscultation bilaterally/no wheezes/no rales/no rhonchi/no respiratory distress/no use of accessory muscles/no subcutaneous emphysema/airway patent/breath sounds equal/good air movement/respirations non-labored

## 2024-11-02 NOTE — PATIENT PROFILE ADULT - FALL HARM RISK - RISK INTERVENTIONS

## 2024-11-02 NOTE — PHARMACOTHERAPY INTERVENTION NOTE - COMMENTS
Patient’s medication profile reviewed. Mandarin  ([id number]) was used to discuss with patient about their medication. All of patient's questions regarding medications were answered.

## 2024-11-02 NOTE — PROGRESS NOTE ADULT - SUBJECTIVE AND OBJECTIVE BOX
Patient is a 74y old  Female who presents with a chief complaint of Anemia (01 Nov 2024 19:31)    pt seen in icu [  ], reg med floor [   ], bed [  ], chair at bedside [   ], a+o x3 [  ], lethargic [  ],  nad [  ]    luu [  ], ngt [  ], peg [  ], et tube [  ], cent line [  ], picc line [  ]        Allergies    No Known Allergies        Vitals    T(F): 97.9 (11-02-24 @ 05:03), Max: 98.6 (11-01-24 @ 22:44)  HR: 83 (11-02-24 @ 05:03) (70 - 99)  BP: 117/56 (11-02-24 @ 05:03) (104/59 - 122/59)  RR: 17 (11-02-24 @ 05:03) (16 - 20)  SpO2: 96% (11-02-24 @ 05:03) (93% - 97%)  Wt(kg): --  CAPILLARY BLOOD GLUCOSE          Labs                          8.2    10.35 )-----------( 320      ( 02 Nov 2024 05:55 )             25.8       11-01    143  |  114[H]  |  18  ----------------------------<  107[H]  3.8   |  26  |  0.57    Ca    8.3[L]      01 Nov 2024 12:00    TPro  7.0  /  Alb  2.0[L]  /  TBili  0.8  /  DBili  x   /  AST  21  /  ALT  25  /  AlkPhos  260[H]  11-01                Radiology Results      Meds    MEDICATIONS  (STANDING):  pantoprazole  Injectable 40 milliGRAM(s) IV Push every 12 hours      MEDICATIONS  (PRN):  acetaminophen     Tablet .. 650 milliGRAM(s) Oral every 6 hours PRN Temp greater or equal to 38C (100.4F), Mild Pain (1 - 3)  aluminum hydroxide/magnesium hydroxide/simethicone Suspension 30 milliLiter(s) Oral every 4 hours PRN Dyspepsia  melatonin 3 milliGRAM(s) Oral at bedtime PRN Insomnia  ondansetron Injectable 4 milliGRAM(s) IV Push every 8 hours PRN Nausea and/or Vomiting      Physical Exam    Neuro :  no focal deficits  Respiratory: CTA B/L  CV: RRR, S1S2, no murmurs,   Abdominal: Soft, NT, ND +BS,  Extremities: No edema, + peripheral pulses    ASSESSMENT    symptomatic anemia,   h/o pancreatic cancer,   DVT and HTN    Anemia    Pancreatic cancer    HTN (hypertension)    Anemia        PLAN    admit to med,   transfuse 1 unit prbc,   cont preadmit home meds,   gi and dvt prophylaxis  cbc,   bmp,   mg,   phos,   lipids,   tsh,         gi cons  heme onc cons     Patient is a 74y old  Female who presents with a chief complaint of Anemia (01 Nov 2024 19:31)    pt seen in icu [  ], reg med floor [  x ], bed [ x ], chair at bedside [   ], a+o x3 [x ], lethargic [  ],  nad [ x ]      Allergies    No Known Allergies        Vitals    T(F): 97.9 (11-02-24 @ 05:03), Max: 98.6 (11-01-24 @ 22:44)  HR: 83 (11-02-24 @ 05:03) (70 - 99)  BP: 117/56 (11-02-24 @ 05:03) (104/59 - 122/59)  RR: 17 (11-02-24 @ 05:03) (16 - 20)  SpO2: 96% (11-02-24 @ 05:03) (93% - 97%)  Wt(kg): --  CAPILLARY BLOOD GLUCOSE          Labs                          8.2    10.35 )-----------( 320      ( 02 Nov 2024 05:55 )             25.8       11-01    143  |  114[H]  |  18  ----------------------------<  107[H]  3.8   |  26  |  0.57    Ca    8.3[L]      01 Nov 2024 12:00    TPro  7.0  /  Alb  2.0[L]  /  TBili  0.8  /  DBili  x   /  AST  21  /  ALT  25  /  AlkPhos  260[H]  11-01                Radiology Results      Meds    MEDICATIONS  (STANDING):  pantoprazole  Injectable 40 milliGRAM(s) IV Push every 12 hours      MEDICATIONS  (PRN):  acetaminophen     Tablet .. 650 milliGRAM(s) Oral every 6 hours PRN Temp greater or equal to 38C (100.4F), Mild Pain (1 - 3)  aluminum hydroxide/magnesium hydroxide/simethicone Suspension 30 milliLiter(s) Oral every 4 hours PRN Dyspepsia  melatonin 3 milliGRAM(s) Oral at bedtime PRN Insomnia  ondansetron Injectable 4 milliGRAM(s) IV Push every 8 hours PRN Nausea and/or Vomiting      Physical Exam    Neuro :  no focal deficits  Respiratory: CTA B/L  CV: RRR, S1S2, no murmurs,   Abdominal: Soft, NT, ND +BS,  Extremities: No edema, + peripheral pulses    ASSESSMENT    symptomatic anemia,   h/o pancreatic cancer,   DVT   HTN  Anemia        PLAN    s/p transfuse 1 unit prbc,   h/h appropriate   start lovenox 1mg /kg  watch for bleeding   rept cbc at 1 pm  heme onc cons   f/u doppler b/l le   gi cons  cont protonix   cont current meds

## 2024-11-03 LAB
ALBUMIN SERPL ELPH-MCNC: 1.7 G/DL — LOW (ref 3.5–5)
ALP SERPL-CCNC: 234 U/L — HIGH (ref 40–120)
ALT FLD-CCNC: 20 U/L DA — SIGNIFICANT CHANGE UP (ref 10–60)
ANION GAP SERPL CALC-SCNC: 10 MMOL/L — SIGNIFICANT CHANGE UP (ref 5–17)
AST SERPL-CCNC: 20 U/L — SIGNIFICANT CHANGE UP (ref 10–40)
BILIRUB SERPL-MCNC: 0.8 MG/DL — SIGNIFICANT CHANGE UP (ref 0.2–1.2)
BUN SERPL-MCNC: 23 MG/DL — HIGH (ref 7–18)
CALCIUM SERPL-MCNC: 8.1 MG/DL — LOW (ref 8.4–10.5)
CHLORIDE SERPL-SCNC: 114 MMOL/L — HIGH (ref 96–108)
CO2 SERPL-SCNC: 19 MMOL/L — LOW (ref 22–31)
CREAT SERPL-MCNC: 0.57 MG/DL — SIGNIFICANT CHANGE UP (ref 0.5–1.3)
EGFR: 95 ML/MIN/1.73M2 — SIGNIFICANT CHANGE UP
GLUCOSE BLDC GLUCOMTR-MCNC: 102 MG/DL — HIGH (ref 70–99)
GLUCOSE BLDC GLUCOMTR-MCNC: 123 MG/DL — HIGH (ref 70–99)
GLUCOSE BLDC GLUCOMTR-MCNC: 144 MG/DL — HIGH (ref 70–99)
GLUCOSE BLDC GLUCOMTR-MCNC: 42 MG/DL — CRITICAL LOW (ref 70–99)
GLUCOSE BLDC GLUCOMTR-MCNC: 84 MG/DL — SIGNIFICANT CHANGE UP (ref 70–99)
GLUCOSE SERPL-MCNC: 38 MG/DL — CRITICAL LOW (ref 70–99)
HCT VFR BLD CALC: 27 % — LOW (ref 34.5–45)
HGB BLD-MCNC: 8.5 G/DL — LOW (ref 11.5–15.5)
MAGNESIUM SERPL-MCNC: 2.3 MG/DL — SIGNIFICANT CHANGE UP (ref 1.6–2.6)
MCHC RBC-ENTMCNC: 26.2 PG — LOW (ref 27–34)
MCHC RBC-ENTMCNC: 31.5 G/DL — LOW (ref 32–36)
MCV RBC AUTO: 83.1 FL — SIGNIFICANT CHANGE UP (ref 80–100)
NRBC # BLD: 0 /100 WBCS — SIGNIFICANT CHANGE UP (ref 0–0)
PHOSPHATE SERPL-MCNC: 3.9 MG/DL — SIGNIFICANT CHANGE UP (ref 2.5–4.5)
PLATELET # BLD AUTO: 431 K/UL — HIGH (ref 150–400)
POTASSIUM SERPL-MCNC: 4 MMOL/L — SIGNIFICANT CHANGE UP (ref 3.5–5.3)
POTASSIUM SERPL-SCNC: 4 MMOL/L — SIGNIFICANT CHANGE UP (ref 3.5–5.3)
PROT SERPL-MCNC: 6.3 G/DL — SIGNIFICANT CHANGE UP (ref 6–8.3)
RBC # BLD: 3.25 M/UL — LOW (ref 3.8–5.2)
RBC # FLD: 18.5 % — HIGH (ref 10.3–14.5)
SODIUM SERPL-SCNC: 143 MMOL/L — SIGNIFICANT CHANGE UP (ref 135–145)
WBC # BLD: 12.27 K/UL — HIGH (ref 3.8–10.5)
WBC # FLD AUTO: 12.27 K/UL — HIGH (ref 3.8–10.5)

## 2024-11-03 PROCEDURE — 99231 SBSQ HOSP IP/OBS SF/LOW 25: CPT

## 2024-11-03 RX ORDER — GLUCAGON INJECTION, SOLUTION 1 MG/.2ML
1 INJECTION, SOLUTION SUBCUTANEOUS ONCE
Refills: 0 | Status: DISCONTINUED | OUTPATIENT
Start: 2024-11-03 | End: 2024-11-04

## 2024-11-03 RX ADMIN — Medication 65 MILLILITER(S): at 12:19

## 2024-11-03 RX ADMIN — PANTOPRAZOLE SODIUM 40 MILLIGRAM(S): 40 TABLET, DELAYED RELEASE ORAL at 17:19

## 2024-11-03 RX ADMIN — Medication 15 GRAM(S): at 08:13

## 2024-11-03 RX ADMIN — PANTOPRAZOLE SODIUM 40 MILLIGRAM(S): 40 TABLET, DELAYED RELEASE ORAL at 06:11

## 2024-11-03 RX ADMIN — Medication 40 MILLIGRAM(S): at 06:11

## 2024-11-03 NOTE — PROGRESS NOTE ADULT - SUBJECTIVE AND OBJECTIVE BOX
Patient is a 74y old  Female who presents with a chief complaint of Anemia (02 Nov 2024 15:48)    pt seen in icu [  ], reg med floor [  x ], bed [ x ], chair at bedside [   ], a+o x3 [x ], lethargic [  ],    nad [ x ]        Allergies    No Known Allergies        Vitals    T(F): 98.8 (11-03-24 @ 05:05), Max: 98.8 (11-03-24 @ 05:05)  HR: 83 (11-03-24 @ 05:05) (71 - 83)  BP: 112/57 (11-03-24 @ 05:05) (111/97 - 136/69)  RR: 17 (11-03-24 @ 05:05) (17 - 17)  SpO2: 92% (11-03-24 @ 05:05) (92% - 94%)  Wt(kg): --  CAPILLARY BLOOD GLUCOSE          Labs                          8.7    12.26 )-----------( 386      ( 02 Nov 2024 13:15 )             27.5       11-02    144  |  114[H]  |  18  ----------------------------<  55[L]  4.0   |  22  |  0.48[L]    Ca    8.3[L]      02 Nov 2024 05:55  Phos  4.0     11-02  Mg     2.4     11-02    TPro  6.2  /  Alb  1.6[L]  /  TBili  1.1  /  DBili  x   /  AST  29  /  ALT  23  /  AlkPhos  230[H]  11-02                Radiology Results      Meds    MEDICATIONS  (STANDING):  enoxaparin Injectable 40 milliGRAM(s) SubCutaneous every 12 hours  pantoprazole  Injectable 40 milliGRAM(s) IV Push every 12 hours      MEDICATIONS  (PRN):  acetaminophen     Tablet .. 650 milliGRAM(s) Oral every 6 hours PRN Temp greater or equal to 38C (100.4F), Mild Pain (1 - 3)  aluminum hydroxide/magnesium hydroxide/simethicone Suspension 30 milliLiter(s) Oral every 4 hours PRN Dyspepsia  melatonin 3 milliGRAM(s) Oral at bedtime PRN Insomnia  ondansetron Injectable 4 milliGRAM(s) IV Push every 8 hours PRN Nausea and/or Vomiting      Physical Exam    Neuro :  no focal deficits  Respiratory: CTA B/L  CV: RRR, S1S2, no murmurs,   Abdominal: Soft, NT, ND +BS,  Extremities: No edema, + peripheral pulses    ASSESSMENT    symptomatic anemia,   h/o pancreatic cancer,   DVT   HTN  Anemia        PLAN    s/p transfuse 1 unit prbc,   h/h appropriate   start lovenox 1mg /kg  watch for bleeding   rept cbc at 1 pm  heme onc cons   f/u doppler b/l le   gi cons  cont protonix   cont current meds          Patient is a 74y old  Female who presents with a chief complaint of Anemia (02 Nov 2024 15:48)    pt seen in icu [  ], reg med floor [  x ], bed [ x ], chair at bedside [   ], a+o x3 [x ], lethargic [  ],    nad [ x ]        Allergies    No Known Allergies        Vitals    T(F): 98.8 (11-03-24 @ 05:05), Max: 98.8 (11-03-24 @ 05:05)  HR: 83 (11-03-24 @ 05:05) (71 - 83)  BP: 112/57 (11-03-24 @ 05:05) (111/97 - 136/69)  RR: 17 (11-03-24 @ 05:05) (17 - 17)  SpO2: 92% (11-03-24 @ 05:05) (92% - 94%)  Wt(kg): --  CAPILLARY BLOOD GLUCOSE          Labs                          8.7    12.26 )-----------( 386      ( 02 Nov 2024 13:15 )             27.5       11-02    144  |  114[H]  |  18  ----------------------------<  55[L]  4.0   |  22  |  0.48[L]    Ca    8.3[L]      02 Nov 2024 05:55  Phos  4.0     11-02  Mg     2.4     11-02    TPro  6.2  /  Alb  1.6[L]  /  TBili  1.1  /  DBili  x   /  AST  29  /  ALT  23  /  AlkPhos  230[H]  11-02                Radiology Results      Meds    MEDICATIONS  (STANDING):  enoxaparin Injectable 40 milliGRAM(s) SubCutaneous every 12 hours  pantoprazole  Injectable 40 milliGRAM(s) IV Push every 12 hours      MEDICATIONS  (PRN):  acetaminophen     Tablet .. 650 milliGRAM(s) Oral every 6 hours PRN Temp greater or equal to 38C (100.4F), Mild Pain (1 - 3)  aluminum hydroxide/magnesium hydroxide/simethicone Suspension 30 milliLiter(s) Oral every 4 hours PRN Dyspepsia  melatonin 3 milliGRAM(s) Oral at bedtime PRN Insomnia  ondansetron Injectable 4 milliGRAM(s) IV Push every 8 hours PRN Nausea and/or Vomiting      Physical Exam    Neuro :  no focal deficits  Respiratory: CTA B/L  CV: RRR, S1S2, no murmurs,   Abdominal: Soft, NT, ND +BS,  Extremities: No edema, + peripheral pulses    ASSESSMENT    symptomatic anemia,   melena poss 2nd to gi bleed   hypoglycemia  h/o pancreatic cancer,   DVT   HTN  Anemia        PLAN    s/p transfuse 1 unit prbc,   h/h appropriate   doppler b/l le with No evidence of deep venous thrombosis in either lower extremity.  Right calf vein small saphenous vein superficial venous thrombophlebitis.  Edema of the superficial soft tissues of the lower extremities. Correlate   clinically noted above.   dvt prophylaxis with at boots   heme onc f/u   iron panel, ferritin, B12, folate, LDH and Haptoglobin    - Hold AC due to concern for GI bleeding, until cleared by GI   f/u CT a/p   gi f/u   Anemia (etiology multifactorial)  R/o chronic GI bleeding  Constipation  Transfuse PRBC as needed  Avoid NSAID  Protonix 40mg daily  CT-Scan of abdomen and pelvis   possible egd in am  Diet as tolerated   f/u pro insulin    endo cons  ivf d5w 65 ml/hr   cont current meds

## 2024-11-03 NOTE — PROGRESS NOTE ADULT - SUBJECTIVE AND OBJECTIVE BOX
[   ] ICU                                          [   ] CCU                                      [ X  ] Medical Floor    Patient is a 74 year old female with sever symptomatic anemia. GI consulted to evaluate.         Patient is a 74 year old female, from home, ambulates independently at baseline, with past medical history significant for pancreatic cancer, DVT and HTN presented to the emergency room with sever symptomatic anemia associated with one week history of generalized weakness and fatigue. Patient also reported recently started being treated for DVT with Eliquis. Patient c/o worsening constipation, poor appetite and weight loss but denies abdominal pain, nausea, vomiting, hematemesis, hematochezia, melena, epistaxis, hemoptysis, cough, hematuria, dysuria or diarrhea.       Patient appears comfortable. No new complaints reported, No abdominal pain, N/V, hematemesis, hematochezia, melena, fever, chills, chest pain, SOB, cough or diarrhea reported.      PAIN MANAGEMENT:  Pain Scale:                 0/10  Pain Location:         PAST MEDICAL HISTORY    Pancreatic cancer    HTN (hypertension)    Anemia    DVT        PAST SURGICAL HISTORY    No significant surgical history reported      Allergies    No Known Allergies    Intolerances  None         MEDICATIONS  (STANDING):  enoxaparin Injectable 40 milliGRAM(s) SubCutaneous every 12 hours  pantoprazole  Injectable 40 milliGRAM(s) IV Push every 12 hours    MEDICATIONS  (PRN):  acetaminophen     Tablet .. 650 milliGRAM(s) Oral every 6 hours PRN Temp greater or equal to 38C (100.4F), Mild Pain (1 - 3)  aluminum hydroxide/magnesium hydroxide/simethicone Suspension 30 milliLiter(s) Oral every 4 hours PRN Dyspepsia  melatonin 3 milliGRAM(s) Oral at bedtime PRN Insomnia  ondansetron Injectable 4 milliGRAM(s) IV Push every 8 hours PRN Nausea and/or Vomiting      SOCIAL HISTORY  Advanced Directives:       [ X ] Full Code       [  ] DNR  Marital Status:         [X  ] M      [  ] S      [  ] D       [  ] W  Children:       [ X ] Yes      [  ] No  Occupation:        [  ] Employed       [ X ] Unemployed       [  ] Retired  Diet:       [ X ] Regular       [  ] PEG feeding          [  ] NG tube feeding  Drug Use:           [ X ] No                [  ] Yes  Alcohol:           [ X ] No             [  ] Yes (socially)         [  ] Yes (chronic)  Tobacco:           [  ] Yes           [ X ] No      FAMILY HISTORY  [X  ] Heart Disease            [X  ] Diabetes             [ X ] HTN             [  ] Colon Cancer             [  ] Stomach Cancer              [  ] Pancreatic Cancer        VITALS   Vital Signs Last 24 Hrs  T(C): 37.1 (11-03-24 @ 05:05), Max: 37.1 (11-03-24 @ 05:05)  T(F): 98.8 (11-03-24 @ 05:05), Max: 98.8 (11-03-24 @ 05:05)  HR: 83 (11-03-24 @ 05:05) (71 - 83)  BP: 112/57 (11-03-24 @ 05:05) (111/97 - 136/69)  BP(mean): 71 (11-03-24 @ 05:05) (71 - 71)  RR: 17 (11-03-24 @ 05:05) (17 - 17)  SpO2: 92% (11-03-24 @ 05:05) (92% - 94%)      MEDICATIONS  (STANDING):  dextrose 5%. 1000 milliLiter(s) (100 mL/Hr) IV Continuous <Continuous>  dextrose 5%. 1000 milliLiter(s) (50 mL/Hr) IV Continuous <Continuous>  dextrose 50% Injectable 12.5 Gram(s) IV Push once  dextrose 50% Injectable 25 Gram(s) IV Push once  dextrose 50% Injectable 25 Gram(s) IV Push once  dextrose Oral Gel 15 Gram(s) Oral once  glucagon  Injectable 1 milliGRAM(s) IntraMuscular once  pantoprazole  Injectable 40 milliGRAM(s) IV Push every 12 hours    MEDICATIONS  (PRN):  acetaminophen     Tablet .. 650 milliGRAM(s) Oral every 6 hours PRN Temp greater or equal to 38C (100.4F), Mild Pain (1 - 3)  aluminum hydroxide/magnesium hydroxide/simethicone Suspension 30 milliLiter(s) Oral every 4 hours PRN Dyspepsia  melatonin 3 milliGRAM(s) Oral at bedtime PRN Insomnia  ondansetron Injectable 4 milliGRAM(s) IV Push every 8 hours PRN Nausea and/or Vomiting                            8.5    12.27 )-----------( 431      ( 03 Nov 2024 06:15 )             27.0       11-03    143  |  114[H]  |  23[H]  ----------------------------<  38[LL]  4.0   |  19[L]  |  0.57    Ca    8.1[L]      03 Nov 2024 06:15  Phos  3.9     11-03  Mg     2.3     11-03    TPro  6.3  /  Alb  1.7[L]  /  TBili  0.8  /  DBili  x   /  AST  20  /  ALT  20  /  AlkPhos  234[H]  11-03      PT/INR - ( 01 Nov 2024 12:00 )   PT: 38.4 sec;   INR: 3.35 ratio         PTT - ( 01 Nov 2024 12:00 )  PTT:37.6 sec

## 2024-11-03 NOTE — CHART NOTE - NSCHARTNOTEFT_GEN_A_CORE
EVENT:    SUBJECTIVE:    OBJECTIVE:  Vital Signs Last 24 Hrs  T(C): 37.1 (03 Nov 2024 05:05), Max: 37.1 (03 Nov 2024 05:05)  T(F): 98.8 (03 Nov 2024 05:05), Max: 98.8 (03 Nov 2024 05:05)  HR: 83 (03 Nov 2024 05:05) (71 - 83)  BP: 112/57 (03 Nov 2024 05:05) (111/97 - 136/69)  BP(mean): 71 (03 Nov 2024 05:05) (71 - 71)  RR: 17 (03 Nov 2024 05:05) (17 - 17)  SpO2: 92% (03 Nov 2024 05:05) (92% - 94%)    Parameters below as of 03 Nov 2024 05:05  Patient On (Oxygen Delivery Method): room air        LABS:                        8.5    12.27 )-----------( 431      ( 03 Nov 2024 06:15 )             27.0     11-03    143  |  114[H]  |  23[H]  ----------------------------<  38[LL]  4.0   |  19[L]  |  0.57    Ca    8.1[L]      03 Nov 2024 06:15  Phos  3.9     11-03  Mg     2.3     11-03    TPro  6.3  /  Alb  1.7[L]  /  TBili  0.8  /  DBili  x   /  AST  20  /  ALT  20  /  AlkPhos  234[H]  11-03      EKG:   IMAGING:    ASSESSMENT:  HPI:  Patient is a 74 year old female, from home, ambulates independently at baseline, with PMHx significant for pancreatic cancer, DVT and HTN presents to the ED for symptomatic anemia requiring PRBC. Collateral is obtained from  at bedside.  reports that patient was sent by outpatient oncologist after she was found to have low hemoglobin in outpatient labs. Patient endorses generalized weakness and fatigue over the past weeks. When questioned by resident, patient denies melena, hematuria, nosebleeds and all other forms of bleeding. Patient states that she recently noted swelling of the lower extremity and was started on Eliquis for DVT. Patient and  are not aware of any ultrasound exams prior to being started on Eliquis. Patient denies fevers, chills, nausea, vomiting and changes to bowel movements and urination. (01 Nov 2024 19:31)      PLAN: EVENT:Notified by RN of pt hypoglycemic to 38. Pt awake/alert    SUBJECTIVE:Pt seen and examined. Pt awake/alert/oriented x 3. Mandarin  # [id number] translated. Pt endorses feeling dizzy and tired for at least 2 days due to being in bed and having black BM, last black BM today. Pt denies chest discomfort, N/V/abdominal discomfort.     OBJECTIVE:  Vital Signs Last 24 Hrs  T(C): 37.1 (03 Nov 2024 05:05), Max: 37.1 (03 Nov 2024 05:05)  T(F): 98.8 (03 Nov 2024 05:05), Max: 98.8 (03 Nov 2024 05:05)  HR: 83 (03 Nov 2024 05:05) (71 - 83)  BP: 112/57 (03 Nov 2024 05:05) (111/97 - 136/69)  BP(mean): 71 (03 Nov 2024 05:05) (71 - 71)  RR: 17 (03 Nov 2024 05:05) (17 - 17)  SpO2: 92% (03 Nov 2024 05:05) (92% - 94%)    Parameters below as of 03 Nov 2024 05:05  Patient On (Oxygen Delivery Method): room air    Gen: NAD  HEENT: PERRL, anicteric, no oral mucositis  Resp: Clear breath sounds on auscultation. No rales, no wheezing  CVS: S1S2 present, regular  GI: BS(+), Soft, ND, NT  Extremities : BLE No edema or calf tenderness. PPP. SULEMA: brown BM in rectum.   Neuro: Awake/alert.  no new neuro deficits  Skin: warm,dry,        LABS:                        8.5    12.27 )-----------( 431      ( 03 Nov 2024 06:15 )             27.0     11-03    143  |  114[H]  |  23[H]  ----------------------------<  38[LL]  4.0   |  19[L]  |  0.57    Ca    8.1[L]      03 Nov 2024 06:15  Phos  3.9     11-03  Mg     2.3     11-03    TPro  6.3  /  Alb  1.7[L]  /  TBili  0.8  /  DBili  x   /  AST  20  /  ALT  20  /  AlkPhos  234[H]  11-03        ASSESSMENT:  HPI:  Patient is a 74 year old female, from home, ambulates independently at baseline, with PMHx significant for pancreatic cancer, DVT and HTN presents to the ED for symptomatic anemia requiring PRBC.    Patient admitted for anemia and GI bleed rule out.  Now with hypoglycemia.   PLAN:    -Continue monitoring blood glucose and follow hypoglyemia protocol.   -D/w pt     Socorro Leiav NP Medicine

## 2024-11-04 VITALS
OXYGEN SATURATION: 93 % | RESPIRATION RATE: 18 BRPM | HEART RATE: 87 BPM | SYSTOLIC BLOOD PRESSURE: 133 MMHG | DIASTOLIC BLOOD PRESSURE: 54 MMHG | TEMPERATURE: 99 F

## 2024-11-04 LAB
ALBUMIN SERPL ELPH-MCNC: 1.7 G/DL — LOW (ref 3.5–5)
ALP SERPL-CCNC: 231 U/L — HIGH (ref 40–120)
ALT FLD-CCNC: 16 U/L DA — SIGNIFICANT CHANGE UP (ref 10–60)
ANION GAP SERPL CALC-SCNC: 3 MMOL/L — LOW (ref 5–17)
AST SERPL-CCNC: 20 U/L — SIGNIFICANT CHANGE UP (ref 10–40)
BILIRUB SERPL-MCNC: 0.7 MG/DL — SIGNIFICANT CHANGE UP (ref 0.2–1.2)
BUN SERPL-MCNC: 15 MG/DL — SIGNIFICANT CHANGE UP (ref 7–18)
CALCIUM SERPL-MCNC: 8.2 MG/DL — LOW (ref 8.4–10.5)
CHLORIDE SERPL-SCNC: 114 MMOL/L — HIGH (ref 96–108)
CO2 SERPL-SCNC: 26 MMOL/L — SIGNIFICANT CHANGE UP (ref 22–31)
CREAT SERPL-MCNC: 0.54 MG/DL — SIGNIFICANT CHANGE UP (ref 0.5–1.3)
EGFR: 97 ML/MIN/1.73M2 — SIGNIFICANT CHANGE UP
GLUCOSE BLDC GLUCOMTR-MCNC: 100 MG/DL — HIGH (ref 70–99)
GLUCOSE BLDC GLUCOMTR-MCNC: 106 MG/DL — HIGH (ref 70–99)
GLUCOSE SERPL-MCNC: 102 MG/DL — HIGH (ref 70–99)
HCT VFR BLD CALC: 26.5 % — LOW (ref 34.5–45)
HGB BLD-MCNC: 8.5 G/DL — LOW (ref 11.5–15.5)
MAGNESIUM SERPL-MCNC: 2.3 MG/DL — SIGNIFICANT CHANGE UP (ref 1.6–2.6)
MCHC RBC-ENTMCNC: 26.8 PG — LOW (ref 27–34)
MCHC RBC-ENTMCNC: 32.1 G/DL — SIGNIFICANT CHANGE UP (ref 32–36)
MCV RBC AUTO: 83.6 FL — SIGNIFICANT CHANGE UP (ref 80–100)
NRBC # BLD: 0 /100 WBCS — SIGNIFICANT CHANGE UP (ref 0–0)
PHOSPHATE SERPL-MCNC: 2.5 MG/DL — SIGNIFICANT CHANGE UP (ref 2.5–4.5)
PLATELET # BLD AUTO: 403 K/UL — HIGH (ref 150–400)
POTASSIUM SERPL-MCNC: 3.8 MMOL/L — SIGNIFICANT CHANGE UP (ref 3.5–5.3)
POTASSIUM SERPL-SCNC: 3.8 MMOL/L — SIGNIFICANT CHANGE UP (ref 3.5–5.3)
PROT SERPL-MCNC: 6.3 G/DL — SIGNIFICANT CHANGE UP (ref 6–8.3)
RBC # BLD: 3.17 M/UL — LOW (ref 3.8–5.2)
RBC # FLD: 18.2 % — HIGH (ref 10.3–14.5)
SODIUM SERPL-SCNC: 143 MMOL/L — SIGNIFICANT CHANGE UP (ref 135–145)
WBC # BLD: 11.26 K/UL — HIGH (ref 3.8–10.5)
WBC # FLD AUTO: 11.26 K/UL — HIGH (ref 3.8–10.5)

## 2024-11-04 PROCEDURE — 85027 COMPLETE CBC AUTOMATED: CPT

## 2024-11-04 PROCEDURE — 86850 RBC ANTIBODY SCREEN: CPT

## 2024-11-04 PROCEDURE — 36430 TRANSFUSION BLD/BLD COMPNT: CPT

## 2024-11-04 PROCEDURE — 84206 ASSAY OF PROINSULIN: CPT

## 2024-11-04 PROCEDURE — 84100 ASSAY OF PHOSPHORUS: CPT

## 2024-11-04 PROCEDURE — 86900 BLOOD TYPING SEROLOGIC ABO: CPT

## 2024-11-04 PROCEDURE — 83735 ASSAY OF MAGNESIUM: CPT

## 2024-11-04 PROCEDURE — 99285 EMERGENCY DEPT VISIT HI MDM: CPT

## 2024-11-04 PROCEDURE — 85025 COMPLETE CBC W/AUTO DIFF WBC: CPT

## 2024-11-04 PROCEDURE — 93970 EXTREMITY STUDY: CPT

## 2024-11-04 PROCEDURE — 85610 PROTHROMBIN TIME: CPT

## 2024-11-04 PROCEDURE — 86901 BLOOD TYPING SEROLOGIC RH(D): CPT

## 2024-11-04 PROCEDURE — 86923 COMPATIBILITY TEST ELECTRIC: CPT

## 2024-11-04 PROCEDURE — 80053 COMPREHEN METABOLIC PANEL: CPT

## 2024-11-04 PROCEDURE — 36415 COLL VENOUS BLD VENIPUNCTURE: CPT

## 2024-11-04 PROCEDURE — 82962 GLUCOSE BLOOD TEST: CPT

## 2024-11-04 PROCEDURE — 85730 THROMBOPLASTIN TIME PARTIAL: CPT

## 2024-11-04 PROCEDURE — P9040: CPT

## 2024-11-04 RX ORDER — PANTOPRAZOLE SODIUM 40 MG/1
1 TABLET, DELAYED RELEASE ORAL
Qty: 14 | Refills: 0
Start: 2024-11-04 | End: 2024-11-17

## 2024-11-04 RX ORDER — APIXABAN 5 MG/1
1 TABLET, FILM COATED ORAL
Qty: 60 | Refills: 0
Start: 2024-11-04 | End: 2024-12-03

## 2024-11-04 RX ORDER — FUROSEMIDE 40 MG
1 TABLET ORAL
Refills: 0 | DISCHARGE

## 2024-11-04 RX ORDER — APIXABAN 5 MG/1
1 TABLET, FILM COATED ORAL
Refills: 0 | DISCHARGE

## 2024-11-04 RX ADMIN — Medication 65 MILLILITER(S): at 05:58

## 2024-11-04 RX ADMIN — PANTOPRAZOLE SODIUM 40 MILLIGRAM(S): 40 TABLET, DELAYED RELEASE ORAL at 05:57

## 2024-11-04 NOTE — DISCHARGE NOTE PROVIDER - NSDCCPCAREPLAN_GEN_ALL_CORE_FT
PRINCIPAL DISCHARGE DIAGNOSIS  Diagnosis: Anemia  Assessment and Plan of Treatment: You presented to the hospital for a low blood count. Your hemaglobin was 6.9 in the ED and you were symptomatic. You were transfused with 1 unit of packed red blood cells and your hemaglobin was stable after that. You reported black stools and were seen by a Gastroenterologist Dr. Reyes who recomended you have an EGD done to look for a possible source of bleeding. Your blood counts remained stable and it was determined that you could have this test done as an outpatient procedure.   INSTRUCTIONS  -Continue Protonix 40mg daily in the morning on an empty stomach  -Follow up with GI for an EGD 1-2 weeks  -Follow up with your primary care provider within 1 week      SECONDARY DISCHARGE DIAGNOSES  Diagnosis: Pancreatic cancer  Assessment and Plan of Treatment: You have a history of pancreatic cancer and follow with Dr. Posey outpatient. You were seen by Dr. Posey while in the hospital. You were not given any chemo while hospitalized. Please follow up with Dr. Posey per routine for ongoing treatment. Take all medications a prescribed.    Diagnosis: DVT, lower extremity  Assessment and Plan of Treatment: You have a history of deep vein thrombosis on Eliquis. Please continue Eliquis 2.5mg by mouth two times a day. (Please note dosage change)  -Follow up with Heme/Onc Dr. Posey within 1 week.    Diagnosis: HTN (hypertension)  Assessment and Plan of Treatment: You have a history of high blood pressure. Please continue to take your medications as prescribed. Follow up with your primary care provider for ongoing management.

## 2024-11-04 NOTE — DISCHARGE NOTE PROVIDER - NSDCFUADDAPPT_GEN_ALL_CORE_FT
APPTS ARE READY TO BE MADE: [X] YES    Best Family or Patient Contact (if needed): Son, Shon Solis [phone]    Additional Information about above appointments (if needed):    1: GI for EGD in 1-2 weeks  2: PCP in 1 week  3. Heme/Onc Dr. Posey routine follow up    APPTS ARE READY TO BE MADE: [X] YES    Best Family or Patient Contact (if needed): SonShon [phone]    Additional Information about above appointments (if needed):    1: GI for EGD in 1-2 weeks  2: PCP in 1 week  3. Heme/Onc Dr. Posey routine follow up           Patient's Grandson was outreached, however they advised they were readmitted to the hospital, and that they did not need any assistance with scheduling at this time. There is no set date of discharged from [facility].

## 2024-11-04 NOTE — PROGRESS NOTE ADULT - SUBJECTIVE AND OBJECTIVE BOX
Patient is a 74y old  Female who presents with a chief complaint of Anemia (04 Nov 2024 10:54)      INTERVAL HPI/OVERNIGHT EVENTS: no new complaints    MEDICATIONS  (STANDING):  dextrose 5%. 1000 milliLiter(s) (50 mL/Hr) IV Continuous <Continuous>  dextrose 5%. 1000 milliLiter(s) (100 mL/Hr) IV Continuous <Continuous>  dextrose 5%. 1000 milliLiter(s) (65 mL/Hr) IV Continuous <Continuous>  dextrose 50% Injectable 12.5 Gram(s) IV Push once  dextrose 50% Injectable 25 Gram(s) IV Push once  dextrose 50% Injectable 25 Gram(s) IV Push once  dextrose Oral Gel 15 Gram(s) Oral once  glucagon  Injectable 1 milliGRAM(s) IntraMuscular once  pantoprazole  Injectable 40 milliGRAM(s) IV Push every 12 hours    MEDICATIONS  (PRN):  acetaminophen     Tablet .. 650 milliGRAM(s) Oral every 6 hours PRN Temp greater or equal to 38C (100.4F), Mild Pain (1 - 3)  aluminum hydroxide/magnesium hydroxide/simethicone Suspension 30 milliLiter(s) Oral every 4 hours PRN Dyspepsia  melatonin 3 milliGRAM(s) Oral at bedtime PRN Insomnia  ondansetron Injectable 4 milliGRAM(s) IV Push every 8 hours PRN Nausea and/or Vomiting      __________________________________________________  REVIEW OF SYSTEMS:    CONSTITUTIONAL: No fever,   EYES: no acute visual disturbances  NECK: No pain or stiffness  RESPIRATORY: No cough; No shortness of breath  CARDIOVASCULAR: No chest pain, no palpitations  GASTROINTESTINAL: No pain. No nausea or vomiting; No diarrhea   NEUROLOGICAL: No headache or numbness, no tremors  MUSCULOSKELETAL: No joint pain, no muscle pain  GENITOURINARY: no dysuria, no frequency, no hesitancy  PSYCHIATRY: no depression , no anxiety  ALL OTHER  ROS negative      Vital Signs Last 24 Hrs  T(C): 36.7 (04 Nov 2024 05:06), Max: 37.6 (03 Nov 2024 21:04)  T(F): 98.1 (04 Nov 2024 05:06), Max: 99.7 (03 Nov 2024 21:04)  HR: 66 (04 Nov 2024 05:06) (66 - 93)  BP: 113/53 (04 Nov 2024 05:06) (95/59 - 116/67)  BP(mean): --  RR: 16 (04 Nov 2024 05:06) (16 - 17)  SpO2: 93% (04 Nov 2024 05:06) (93% - 93%)    Parameters below as of 04 Nov 2024 05:06  Patient On (Oxygen Delivery Method): room air        ________________________________________________  PHYSICAL EXAM:  GENERAL: NAD  HEENT: Normocephalic;  conjunctivae and sclerae clear; moist mucous membranes;   NECK : supple  CHEST/LUNG: Clear to auscultation bilaterally with good air entry   HEART: S1 S2  regular; no murmurs, gallops or rubs  ABDOMEN: Soft, Nontender, Nondistended; Bowel sounds present  EXTREMITIES: no cyanosis; no edema; no calf tenderness  SKIN: warm and dry; no rash  NERVOUS SYSTEM:  Awake and alert; Oriented  to place, person and time ; no new deficits    _________________________________________________  LABS:                        8.5    11.26 )-----------( 403      ( 04 Nov 2024 05:16 )             26.5     11-04    143  |  114[H]  |  15  ----------------------------<  102[H]  3.8   |  26  |  0.54    Ca    8.2[L]      04 Nov 2024 05:16  Phos  2.5     11-04  Mg     2.3     11-04    TPro  6.3  /  Alb  1.7[L]  /  TBili  0.7  /  DBili  x   /  AST  20  /  ALT  16  /  AlkPhos  231[H]  11-04      Urinalysis Basic - ( 04 Nov 2024 05:16 )    Color: x / Appearance: x / SG: x / pH: x  Gluc: 102 mg/dL / Ketone: x  / Bili: x / Urobili: x   Blood: x / Protein: x / Nitrite: x   Leuk Esterase: x / RBC: x / WBC x   Sq Epi: x / Non Sq Epi: x / Bacteria: x      CAPILLARY BLOOD GLUCOSE      POCT Blood Glucose.: 100 mg/dL (04 Nov 2024 06:05)  POCT Blood Glucose.: 123 mg/dL (03 Nov 2024 23:41)  POCT Blood Glucose.: 144 mg/dL (03 Nov 2024 16:54)      RADIOLOGY & ADDITIONAL TESTS:    Imaging Personally Reviewed:  YES/NO    Consultant(s) Notes Reviewed:   YES/ No    Care Discussed with Consultants :     Plan of care was discussed with patient and /or primary care giver; all questions and concerns were addressed and care was aligned with patient's wishes.       Patient is a 74y old  Female who presents with a chief complaint of Anemia (04 Nov 2024 10:54)      INTERVAL HPI/OVERNIGHT EVENTS: Pt seen at bedside with daughter at bedside no new complaints.    MEDICATIONS  (STANDING):  dextrose 5%. 1000 milliLiter(s) (50 mL/Hr) IV Continuous <Continuous>  dextrose 5%. 1000 milliLiter(s) (100 mL/Hr) IV Continuous <Continuous>  dextrose 5%. 1000 milliLiter(s) (65 mL/Hr) IV Continuous <Continuous>  dextrose 50% Injectable 12.5 Gram(s) IV Push once  dextrose 50% Injectable 25 Gram(s) IV Push once  dextrose 50% Injectable 25 Gram(s) IV Push once  dextrose Oral Gel 15 Gram(s) Oral once  glucagon  Injectable 1 milliGRAM(s) IntraMuscular once  pantoprazole  Injectable 40 milliGRAM(s) IV Push every 12 hours    MEDICATIONS  (PRN):  acetaminophen     Tablet .. 650 milliGRAM(s) Oral every 6 hours PRN Temp greater or equal to 38C (100.4F), Mild Pain (1 - 3)  aluminum hydroxide/magnesium hydroxide/simethicone Suspension 30 milliLiter(s) Oral every 4 hours PRN Dyspepsia  melatonin 3 milliGRAM(s) Oral at bedtime PRN Insomnia  ondansetron Injectable 4 milliGRAM(s) IV Push every 8 hours PRN Nausea and/or Vomiting    __________________________________________________  REVIEW OF SYSTEMS:    CONSTITUTIONAL: No fever,   EYES: no acute visual disturbances  NECK: No pain or stiffness  RESPIRATORY: No cough; No shortness of breath  CARDIOVASCULAR: No chest pain, no palpitations  GASTROINTESTINAL: No pain. No nausea or vomiting; No diarrhea   NEUROLOGICAL: No headache or numbness, no tremors  MUSCULOSKELETAL: No joint pain, no muscle pain  GENITOURINARY: no dysuria, no frequency, no hesitancy  PSYCHIATRY: no depression , no anxiety  ALL OTHER  ROS negative      Vital Signs Last 24 Hrs  T(C): 36.7 (04 Nov 2024 05:06), Max: 37.6 (03 Nov 2024 21:04)  T(F): 98.1 (04 Nov 2024 05:06), Max: 99.7 (03 Nov 2024 21:04)  HR: 66 (04 Nov 2024 05:06) (66 - 93)  BP: 113/53 (04 Nov 2024 05:06) (95/59 - 116/67)  BP(mean): --  RR: 16 (04 Nov 2024 05:06) (16 - 17)  SpO2: 93% (04 Nov 2024 05:06) (93% - 93%)    Parameters below as of 04 Nov 2024 05:06  Patient On (Oxygen Delivery Method): room air  ________________________________________________  PHYSICAL EXAM:  GENERAL: NAD  HEENT: Normocephalic;  conjunctivae and sclerae clear; moist mucous membranes;   NECK : supple  CHEST/LUNG: Clear to auscultation bilaterally with good air entry   HEART: S1 S2  regular; no murmurs, gallops or rubs  ABDOMEN: Soft, Nontender, Nondistended; Bowel sounds present  EXTREMITIES: no cyanosis; no edema; no calf tenderness  SKIN: warm and dry; no rash  NERVOUS SYSTEM:  Awake and alert; Oriented  to place, person and time ; no new deficits  _________________________________________________  LABS:                        8.5    11.26 )-----------( 403      ( 04 Nov 2024 05:16 )             26.5     11-04    143  |  114[H]  |  15  ----------------------------<  102[H]  3.8   |  26  |  0.54    Ca    8.2[L]      04 Nov 2024 05:16  Phos  2.5     11-04  Mg     2.3     11-04    TPro  6.3  /  Alb  1.7[L]  /  TBili  0.7  /  DBili  x   /  AST  20  /  ALT  16  /  AlkPhos  231[H]  11-04    Urinalysis Basic - ( 04 Nov 2024 05:16 )    Color: x / Appearance: x / SG: x / pH: x  Gluc: 102 mg/dL / Ketone: x  / Bili: x / Urobili: x   Blood: x / Protein: x / Nitrite: x   Leuk Esterase: x / RBC: x / WBC x   Sq Epi: x / Non Sq Epi: x / Bacteria: x    CAPILLARY BLOOD GLUCOSE    POCT Blood Glucose.: 100 mg/dL (04 Nov 2024 06:05)  POCT Blood Glucose.: 123 mg/dL (03 Nov 2024 23:41)  POCT Blood Glucose.: 144 mg/dL (03 Nov 2024 16:54)    RADIOLOGY & ADDITIONAL TESTS:  < from: US Duplex Venous Lower Ext Complete, Bilateral (11.02.24 @ 15:08) >  ACC: [id number] EXAM:  US DPLX LWR EXT VEINS COMPL BI   ORDERED BY: TIO ANNA     PROCEDURE DATE:  11/02/2024        INTERPRETATION:  CLINICAL INFORMATION: Question DVT    COMPARISON: None available.    TECHNIQUE: Duplex sonography of the BILATERAL LOWER extremity veins with   color and spectral Doppler, with and without compression.    FINDINGS:    RIGHT:  Normal compressibility of the RIGHT common femoral, femoral and popliteal   veins. Doppler examination shows normal spontaneous and phasic flow. No   RIGHT calf vein thrombosis is detected aside from right small saphenous   vein superficial venous thrombophlebitis.    LEFT:  Normal compressibility of the LEFT common femoral, femoral and popliteal   veins. Doppler examination showsnormal spontaneous and phasic flow. No   LEFT calf vein thrombosis is detected.    Edema of the superficial soft tissues of the lower extremities. Correlate   clinically.    IMPRESSION:    No evidence of deep venous thrombosis in either lower extremity.    Right calf vein small saphenous vein superficial venous thrombophlebitis.    Edema of the superficial soft tissues of the lower extremities. Correlate   clinically.    --- End of Report ---    Imaging Personally Reviewed:  YES    Consultant(s) Notes Reviewed:   YES    Plan of care was discussed with patient and /or primary care giver; all questions and concerns were addressed and care was aligned with patient's wishes.

## 2024-11-04 NOTE — PROGRESS NOTE ADULT - PROVIDER SPECIALTY LIST ADULT
Internal Medicine
Internal Medicine
Heme/Onc
Internal Medicine
Internal Medicine
Heme/Onc
Gastroenterology
Gastroenterology

## 2024-11-04 NOTE — PROGRESS NOTE ADULT - SUBJECTIVE AND OBJECTIVE BOX
HPI:  Patient is a 74 year old female, from home, ambulates independently at baseline, with PMHx significant for pancreatic cancer, DVT and HTN presents to the ED for symptomatic anemia requiring PRBC. Collateral is obtained from  at bedside.  reports that patient was sent by outpatient oncologist after she was found to have low hemoglobin in outpatient labs. Patient endorses generalized weakness and fatigue over the past weeks. When questioned by resident, patient denies melena, hematuria, nosebleeds and all other forms of bleeding. Patient states that she recently noted swelling of the lower extremity and was started on Eliquis for DVT. Patient and  are not aware of any ultrasound exams prior to being started on Eliquis. Patient denies fevers, chills, nausea, vomiting and changes to bowel movements and urination. (01 Nov 2024 19:31)     Pt is seen and examined  pt is awake and lying in bed/out of bed to chair  pt seems comfortable and denies any complaints at this time    ROS:  Negative except for:    MEDICATIONS  (STANDING):  dextrose 5%. 1000 milliLiter(s) (100 mL/Hr) IV Continuous <Continuous>  dextrose 5%. 1000 milliLiter(s) (65 mL/Hr) IV Continuous <Continuous>  dextrose 5%. 1000 milliLiter(s) (50 mL/Hr) IV Continuous <Continuous>  dextrose 50% Injectable 12.5 Gram(s) IV Push once  dextrose 50% Injectable 25 Gram(s) IV Push once  dextrose 50% Injectable 25 Gram(s) IV Push once  dextrose Oral Gel 15 Gram(s) Oral once  glucagon  Injectable 1 milliGRAM(s) IntraMuscular once  pantoprazole  Injectable 40 milliGRAM(s) IV Push every 12 hours    MEDICATIONS  (PRN):  acetaminophen     Tablet .. 650 milliGRAM(s) Oral every 6 hours PRN Temp greater or equal to 38C (100.4F), Mild Pain (1 - 3)  aluminum hydroxide/magnesium hydroxide/simethicone Suspension 30 milliLiter(s) Oral every 4 hours PRN Dyspepsia  melatonin 3 milliGRAM(s) Oral at bedtime PRN Insomnia  ondansetron Injectable 4 milliGRAM(s) IV Push every 8 hours PRN Nausea and/or Vomiting      Allergies    No Known Allergies    Intolerances        Vital Signs Last 24 Hrs  T(C): 36.7 (04 Nov 2024 05:06), Max: 37.6 (03 Nov 2024 21:04)  T(F): 98.1 (04 Nov 2024 05:06), Max: 99.7 (03 Nov 2024 21:04)  HR: 66 (04 Nov 2024 05:06) (66 - 93)  BP: 113/53 (04 Nov 2024 05:06) (95/59 - 116/67)  BP(mean): --  RR: 16 (04 Nov 2024 05:06) (16 - 17)  SpO2: 93% (04 Nov 2024 05:06) (93% - 93%)    Parameters below as of 04 Nov 2024 05:06  Patient On (Oxygen Delivery Method): room air        PHYSICAL EXAM  General: adult in NAD  HEENT: clear oropharynx, anicteric sclera, pink conjunctiva  Neck: supple  CV: normal S1/S2 with no murmur rubs or gallops  Lungs: positive air movement b/l ant lungs,clear to auscultation, no wheezes, no rales  Abdomen: soft non-tender non-distended, no hepatosplenomegaly  Ext: no clubbing cyanosis or edema  Skin: no rashes and no petechiae  Neuro: alert and oriented X 4, no focal deficits  LABS:                          8.5    11.26 )-----------( 403      ( 04 Nov 2024 05:16 )             26.5         Mean Cell Volume : 83.6 fl  Mean Cell Hemoglobin : 26.8 pg  Mean Cell Hemoglobin Concentration : 32.1 g/dL  Auto Neutrophil # : x  Auto Lymphocyte # : x  Auto Monocyte # : x  Auto Eosinophil # : x  Auto Basophil # : x  Auto Neutrophil % : x  Auto Lymphocyte % : x  Auto Monocyte % : x  Auto Eosinophil % : x  Auto Basophil % : x    Serial CBC  Hematocrit 26.5  Hemoglobin 8.5  Plat 403  RBC 3.17  WBC 11.26  Serial CBC  Hematocrit 27.0  Hemoglobin 8.5  Plat 431  RBC 3.25  WBC 12.27  Serial CBC  Hematocrit 27.5  Hemoglobin 8.7  Plat 386  RBC 3.27  WBC 12.26  Serial CBC  Hematocrit 25.8  Hemoglobin 8.2  Plat 320  RBC 3.10  WBC 10.35  Serial CBC  Hematocrit 22.8  Hemoglobin 7.4  Plat 276  RBC 2.71  WBC 9.85  Serial CBC  Hematocrit 22.1  Hemoglobin 6.9  Plat 331  RBC 2.58  WBC 12.35    11-04    143  |  114[H]  |  15  ----------------------------<  102[H]  3.8   |  26  |  0.54    Ca    8.2[L]      04 Nov 2024 05:16  Phos  2.5     11-04  Mg     2.3     11-04    TPro  6.3  /  Alb  1.7[L]  /  TBili  0.7  /  DBili  x   /  AST  20  /  ALT  16  /  AlkPhos  231[H]  11-04                    BLOOD SMEAR INTERPRETATION:       RADIOLOGY & ADDITIONAL STUDIES:

## 2024-11-04 NOTE — DISCHARGE NOTE NURSING/CASE MANAGEMENT/SOCIAL WORK - FINANCIAL ASSISTANCE
[employer] provides services at a reduced cost to those who are determined to be eligible through [employer]’s financial assistance program. Information regarding [employer]’s financial assistance program can be found by going to [email or web address] or by calling [phone].

## 2024-11-04 NOTE — DISCHARGE NOTE NURSING/CASE MANAGEMENT/SOCIAL WORK - PATIENT PORTAL LINK FT
You can access the FollowMyHealth Patient Portal offered by [facility] by registering at the following website: [email or web address]. By joining [employer]’s FollowMyHealth portal, you will also be able to view your health information using other applications (apps) compatible with our system.

## 2024-11-04 NOTE — PROGRESS NOTE ADULT - SUBJECTIVE AND OBJECTIVE BOX
Patient is a 74y old  Female who presents with a chief complaint of Anemia (03 Nov 2024 08:33)    pt seen in icu [  ], reg med floor [  x ], bed [ x ], chair at bedside [   ], a+o x3 [x ], lethargic [  ],    nad [ x ]      Allergies    No Known Allergies        Vitals    T(F): 98.1 (11-04-24 @ 05:06), Max: 99.7 (11-03-24 @ 21:04)  HR: 66 (11-04-24 @ 05:06) (66 - 93)  BP: 113/53 (11-04-24 @ 05:06) (95/59 - 116/67)  RR: 16 (11-04-24 @ 05:06) (16 - 17)  SpO2: 93% (11-04-24 @ 05:06) (93% - 93%)  Wt(kg): --  CAPILLARY BLOOD GLUCOSE      POCT Blood Glucose.: 100 mg/dL (04 Nov 2024 06:05)      Labs                          8.5    11.26 )-----------( 403      ( 04 Nov 2024 05:16 )             26.5       11-04    143  |  114[H]  |  15  ----------------------------<  102[H]  3.8   |  26  |  0.54    Ca    8.2[L]      04 Nov 2024 05:16  Phos  2.5     11-04  Mg     2.3     11-04    TPro  6.3  /  Alb  1.7[L]  /  TBili  0.7  /  DBili  x   /  AST  20  /  ALT  16  /  AlkPhos  231[H]  11-04                Radiology Results      Meds    MEDICATIONS  (STANDING):  dextrose 5%. 1000 milliLiter(s) (65 mL/Hr) IV Continuous <Continuous>  dextrose 5%. 1000 milliLiter(s) (50 mL/Hr) IV Continuous <Continuous>  dextrose 5%. 1000 milliLiter(s) (100 mL/Hr) IV Continuous <Continuous>  dextrose 50% Injectable 12.5 Gram(s) IV Push once  dextrose 50% Injectable 25 Gram(s) IV Push once  dextrose 50% Injectable 25 Gram(s) IV Push once  dextrose Oral Gel 15 Gram(s) Oral once  glucagon  Injectable 1 milliGRAM(s) IntraMuscular once  pantoprazole  Injectable 40 milliGRAM(s) IV Push every 12 hours      MEDICATIONS  (PRN):  acetaminophen     Tablet .. 650 milliGRAM(s) Oral every 6 hours PRN Temp greater or equal to 38C (100.4F), Mild Pain (1 - 3)  aluminum hydroxide/magnesium hydroxide/simethicone Suspension 30 milliLiter(s) Oral every 4 hours PRN Dyspepsia  melatonin 3 milliGRAM(s) Oral at bedtime PRN Insomnia  ondansetron Injectable 4 milliGRAM(s) IV Push every 8 hours PRN Nausea and/or Vomiting      Physical Exam    Neuro :  no focal deficits  Respiratory: CTA B/L  CV: RRR, S1S2, no murmurs,   Abdominal: Soft, NT, ND +BS,  Extremities: No edema, + peripheral pulses    ASSESSMENT    symptomatic anemia,   melena poss 2nd to gi bleed   hypoglycemia  h/o pancreatic cancer,   DVT   HTN  Anemia        PLAN    s/p transfuse 1 unit prbc,   h/h appropriate   doppler b/l le with No evidence of deep venous thrombosis in either lower extremity.  Right calf vein small saphenous vein superficial venous thrombophlebitis.  Edema of the superficial soft tissues of the lower extremities. Correlate   clinically noted above.   dvt prophylaxis with at boots   heme onc f/u   iron panel, ferritin, B12, folate, LDH and Haptoglobin    - Hold AC due to concern for GI bleeding, until cleared by GI   f/u CT a/p   gi f/u   Anemia (etiology multifactorial)  R/o chronic GI bleeding  Constipation  Transfuse PRBC as needed  Avoid NSAID  Protonix 40mg daily  CT-Scan of abdomen and pelvis   possible egd in am  Diet as tolerated   f/u pro insulin    endo cons  ivf d5w 65 ml/hr   cont current meds      Patient is a 74y old  Female who presents with a chief complaint of Anemia (03 Nov 2024 08:33)    pt seen in icu [  ], reg med floor [  x ], bed [ x ], chair at bedside [   ], a+o x3 [x ], lethargic [  ],    nad [ x ]      Allergies    No Known Allergies        Vitals    T(F): 98.1 (11-04-24 @ 05:06), Max: 99.7 (11-03-24 @ 21:04)  HR: 66 (11-04-24 @ 05:06) (66 - 93)  BP: 113/53 (11-04-24 @ 05:06) (95/59 - 116/67)  RR: 16 (11-04-24 @ 05:06) (16 - 17)  SpO2: 93% (11-04-24 @ 05:06) (93% - 93%)  Wt(kg): --  CAPILLARY BLOOD GLUCOSE      POCT Blood Glucose.: 100 mg/dL (04 Nov 2024 06:05)      Labs                          8.5    11.26 )-----------( 403      ( 04 Nov 2024 05:16 )             26.5       11-04    143  |  114[H]  |  15  ----------------------------<  102[H]  3.8   |  26  |  0.54    Ca    8.2[L]      04 Nov 2024 05:16  Phos  2.5     11-04  Mg     2.3     11-04    TPro  6.3  /  Alb  1.7[L]  /  TBili  0.7  /  DBili  x   /  AST  20  /  ALT  16  /  AlkPhos  231[H]  11-04                Radiology Results      Meds    MEDICATIONS  (STANDING):  dextrose 5%. 1000 milliLiter(s) (65 mL/Hr) IV Continuous <Continuous>  dextrose 5%. 1000 milliLiter(s) (50 mL/Hr) IV Continuous <Continuous>  dextrose 5%. 1000 milliLiter(s) (100 mL/Hr) IV Continuous <Continuous>  dextrose 50% Injectable 12.5 Gram(s) IV Push once  dextrose 50% Injectable 25 Gram(s) IV Push once  dextrose 50% Injectable 25 Gram(s) IV Push once  dextrose Oral Gel 15 Gram(s) Oral once  glucagon  Injectable 1 milliGRAM(s) IntraMuscular once  pantoprazole  Injectable 40 milliGRAM(s) IV Push every 12 hours      MEDICATIONS  (PRN):  acetaminophen     Tablet .. 650 milliGRAM(s) Oral every 6 hours PRN Temp greater or equal to 38C (100.4F), Mild Pain (1 - 3)  aluminum hydroxide/magnesium hydroxide/simethicone Suspension 30 milliLiter(s) Oral every 4 hours PRN Dyspepsia  melatonin 3 milliGRAM(s) Oral at bedtime PRN Insomnia  ondansetron Injectable 4 milliGRAM(s) IV Push every 8 hours PRN Nausea and/or Vomiting      Physical Exam    Neuro :  no focal deficits  Respiratory: CTA B/L  CV: RRR, S1S2, no murmurs,   Abdominal: Soft, NT, ND +BS,  Extremities: No edema, + peripheral pulses    ASSESSMENT    symptomatic anemia,   melena poss 2nd to gi bleed   hypoglycemia  h/o pancreatic cancer,   DVT   HTN  Anemia        PLAN    s/p transfuse 1 unit prbc,   h/h appropriate   doppler b/l le with No evidence of deep venous thrombosis in either lower extremity.  Right calf vein small saphenous vein superficial venous thrombophlebitis.  Edema of the superficial soft tissues of the lower extremities. Correlate   clinically noted    heme onc f/u   iron panel, ferritin, B12, folate, LDH and Haptoglobin    d/w heme onc resume eliquis 2.5 mg daily for dvt prophylaxix  will do EGD as outpt  continue protonix and carafate  gi f/u   Anemia (etiology multifactorial)  no signs of active bleeding  Constipation  Transfuse PRBC as needed  Avoid NSAID  Protonix 40mg daily  no needCT-Scan of abdomen and pelvis and egd as h/h stable  Diet as tolerated   hypoglycemia resolved    cont current meds   pt stable for d/c

## 2024-11-04 NOTE — PROGRESS NOTE ADULT - NEGATIVE OPHTHALMOLOGIC SYMPTOMS
no diplopia/no photophobia/no lacrimation L/no lacrimation R/no blurred vision L/no blurred vision R/no discharge L/no discharge R/no pain L/no pain R/no irritation L/no irritation R/no scleral injection R
no diplopia/no photophobia/no lacrimation L/no lacrimation R/no blurred vision L/no blurred vision R/no discharge L/no discharge R/no pain L/no pain R/no irritation L/no irritation R/no scleral injection R

## 2024-11-04 NOTE — DISCHARGE NOTE NURSING/CASE MANAGEMENT/SOCIAL WORK - NSDCPEFALRISK_GEN_ALL_CORE
For information on Fall & Injury Prevention, visit: [email or web address] OR  [email or web address] OR  https://www.cdc.gov/steadi/patient.html

## 2024-11-04 NOTE — PROGRESS NOTE ADULT - NEGATIVE MUSCULOSKELETAL SYMPTOMS
no muscle weakness/no neck pain/no arm pain L/no leg pain L/no leg pain R
no muscle weakness/no neck pain/no arm pain L/no leg pain L/no leg pain R

## 2024-11-04 NOTE — PROGRESS NOTE ADULT - NEGATIVE NEUROLOGICAL SYMPTOMS
no focal seizures/no syncope/no vertigo/no loss of sensation/no headache
no focal seizures/no syncope/no vertigo/no loss of sensation/no headache

## 2024-11-04 NOTE — DISCHARGE NOTE PROVIDER - CARE PROVIDER_API CALL
TUBBS, MIN  [address] [facility] [address]  [place] [id number]  Phone: [phone]  Fax: [phone]  Established Patient  Follow Up Time: 1 week    Shweta PoseyEd  Medical Oncology  [address] [place] [id number]  Phone: [phone]  Fax: [phone]  Established Patient  Follow Up Time: 1 week    Haroon Reyes  Gastroenterology  [address] [place] [address], [address]  [place] [address]  Phone: [phone]  Fax: [phone]  Established Patient  Follow Up Time: 1 week

## 2024-11-04 NOTE — PROGRESS NOTE ADULT - NEGATIVE GENERAL SYMPTOMS
no fever/no chills/no sweating/no polyphagia/no polyuria/no polydipsia
no fever/no chills/no sweating/no polyphagia/no polyuria/no polydipsia
Winlevi Pregnancy And Lactation Text: This medication is considered safe during pregnancy and breastfeeding.

## 2024-11-04 NOTE — PROGRESS NOTE ADULT - ASSESSMENT
73 yo female who follows with Dr. Posey for her diagnosis of metastatic pancreatic cancer on gem/abraxane (LD on 10/18/24), recent diagnosis of b/l LE DVT on 10/28 for which she was started on eliquis, h/o GIB, was seen by Dr. Posey on 11/1 and found to have severe anemia with HB 6.3.    Severe anemia   - Hb 6.9 s/p 1 unit PRBC with appropriate response in Hb   - reports black stools   - Check FOBT, if + consider endoscopic evaluation   - Check iron panel, ferritin, B12, folate, LDH and Haptoglobin    - Hold AC due to concern for GI bleeding, until cleared by GI   - Planned for CT a/p   - GI following     B/l DVT   - diagnosed on 10/28  - started on eliquis which is on hold due to concern for GIB, okay to start once cleared by GI   - on DVT ppx   - Serial dopplers while off AC     Pancreatic Cancer   - on gem/abraxane (LD on 10/18/24)  - follows Dr. Posey [facility]   - no plans for inpatient treatment     will continue to follow. 
· Assessment	  73 yo female who follows with Dr. Posey for her diagnosis of metastatic pancreatic cancer on gem/abraxane (LD on 10/18/24), recent diagnosis of b/l LE DVT on 10/28 for which she was started on eliquis, h/o GIB, was seen by Dr. Posey on 11/1 and found to have severe anemia with HB 6.3.    Severe anemia   - Hb 6.9 s/p 1 unit PRBC with appropriate response in Hb   - reports black stools   - Check FOBT, if + consider endoscopic evaluation   - Check iron panel, ferritin, B12, folate, LDH and Haptoglobin    - Hold AC due to concern for GI bleeding, until cleared by GI   - Planned for CT a/p   - GI following   -H/H stable.  bleeding seems stopped  she can go home.  will do EGD as outpt  continue protonix and carafate    B/l DVT   - diagnosed on 10/28  - started on eliquis which is on hold due to concern for GIB, okay to start once cleared by GI   - on DVT ppx   - Serial dopplers while off AC   -repeat doppler showed small superficial clot only  on lovenox 40  will go home with eliquis 2.5 bid    Pancreatic Cancer   - on gem/abraxane (LD on 10/18/24)  - follows Dr. Posey [facility]   - no plans for inpatient treatment     will continue to follow. 
1. Anemia (H/H stable)  2. No evidence of acute GI bleeding  3. R/o chronic GI bleeding  4. Constipation  5. Pancreatic cancer    Suggestions:    1. Monitor H/H  2. Transfuse PRBC as needed  3. Avoid NSAID  4. Protonix 40mg daily  5. Diet as tolerated  6. Daily stool softener  7. Check stool for occult blood  8. Hem-oncology follow up  9. DVT prophylaxis  9. 
Patient is a 74 year old female with PMHx significant for pancreatic cancer, DVT and HTN presents to the ED for symptomatic anemia requiring PRBC. Patient is being admitted for anemia and GI bleed rule out.  11/4: S/p 1 unit  PRBC- Hgb stable, EGD to be done outpt per GI and Heme-Onc
1. Anemia (H/H stable)  2. No evidence of acute GI bleeding  3. R/o chronic GI bleeding  4. Constipation  5. Pancreatic cancer    Suggestions:    1. Monitor H/H  2. Transfuse PRBC as needed  3. Avoid NSAID  4. Protonix 40mg daily  5. CT-Scan of abdomen and pelvis  6. Diet as tolerated  7. Daily stool softener  8. Check stool for occult blood  9. Hem-oncology evaluation  10. DVT prophylaxis  9.

## 2024-11-04 NOTE — PROGRESS NOTE ADULT - PROBLEM SELECTOR PLAN 2
Found to have DVT, started on Eliquis  - Holding Eliquis iso of possible GI bleed  - Resume Eliquis 2.5mg BID

## 2024-11-04 NOTE — PROGRESS NOTE ADULT - PROBLEM SELECTOR PLAN 1
P/w anemia of 6.9 found at outpatient practice  -Patient symptomatic with generalized weakness  -S/p 1 PRBC  -No active source of bleeding, SULEMA negative, pt denies   -Hgb stable   -Will do EGD outpt   -C/w PPI   -GI Dr. Reyes following  - Heme/Onc Dr. Posey following

## 2024-11-04 NOTE — PROGRESS NOTE ADULT - SUBJECTIVE AND OBJECTIVE BOX
[   ] ICU                                          [   ] CCU                                      [ X  ] Medical Floor    Patient is a 74 year old female with sever symptomatic anemia. GI consulted to evaluate.         Patient is a 74 year old female, from home, ambulates independently at baseline, with past medical history significant for pancreatic cancer, DVT and HTN presented to the emergency room with sever symptomatic anemia associated with one week history of generalized weakness and fatigue. Patient also reported recently started being treated for DVT with Eliquis. Patient c/o worsening constipation, poor appetite and weight loss but denies abdominal pain, nausea, vomiting, hematemesis, hematochezia, melena, epistaxis, hemoptysis, cough, hematuria, dysuria or diarrhea.       Patient appears comfortable. No new complaints reported, No abdominal pain, N/V, hematemesis, hematochezia, melena, fever, chills, chest pain, SOB, cough or diarrhea reported.      PAIN MANAGEMENT:  Pain Scale:                 0/10  Pain Location:         PAST MEDICAL HISTORY    Pancreatic cancer    HTN (hypertension)    Anemia    DVT        PAST SURGICAL HISTORY    No significant surgical history reported      Allergies    No Known Allergies    Intolerances  None         MEDICATIONS  (STANDING):  enoxaparin Injectable 40 milliGRAM(s) SubCutaneous every 12 hours  pantoprazole  Injectable 40 milliGRAM(s) IV Push every 12 hours    MEDICATIONS  (PRN):  acetaminophen     Tablet .. 650 milliGRAM(s) Oral every 6 hours PRN Temp greater or equal to 38C (100.4F), Mild Pain (1 - 3)  aluminum hydroxide/magnesium hydroxide/simethicone Suspension 30 milliLiter(s) Oral every 4 hours PRN Dyspepsia  melatonin 3 milliGRAM(s) Oral at bedtime PRN Insomnia  ondansetron Injectable 4 milliGRAM(s) IV Push every 8 hours PRN Nausea and/or Vomiting      SOCIAL HISTORY  Advanced Directives:       [ X ] Full Code       [  ] DNR  Marital Status:         [X  ] M      [  ] S      [  ] D       [  ] W  Children:       [ X ] Yes      [  ] No  Occupation:        [  ] Employed       [ X ] Unemployed       [  ] Retired  Diet:       [ X ] Regular       [  ] PEG feeding          [  ] NG tube feeding  Drug Use:           [ X ] No                [  ] Yes  Alcohol:           [ X ] No             [  ] Yes (socially)         [  ] Yes (chronic)  Tobacco:           [  ] Yes           [ X ] No      FAMILY HISTORY  [X  ] Heart Disease            [X  ] Diabetes             [ X ] HTN             [  ] Colon Cancer             [  ] Stomach Cancer              [  ] Pancreatic Cancer        VITALS   Vital Signs Last 24 Hrs  T(C): 36.7 (11-04-24 @ 05:06), Max: 37.6 (11-03-24 @ 21:04)  T(F): 98.1 (11-04-24 @ 05:06), Max: 99.7 (11-03-24 @ 21:04)  HR: 66 (11-04-24 @ 05:06) (66 - 93)  BP: 113/53 (11-04-24 @ 05:06) (95/59 - 116/67)   RR: 16 (11-04-24 @ 05:06) (16 - 17)  SpO2: 93% (11-04-24 @ 05:06) (93% - 93%)        MEDICATIONS  (STANDING):  dextrose 5%. 1000 milliLiter(s) (100 mL/Hr) IV Continuous <Continuous>  dextrose 5%. 1000 milliLiter(s) (65 mL/Hr) IV Continuous <Continuous>  dextrose 5%. 1000 milliLiter(s) (50 mL/Hr) IV Continuous <Continuous>  dextrose 50% Injectable 12.5 Gram(s) IV Push once  dextrose 50% Injectable 25 Gram(s) IV Push once  dextrose 50% Injectable 25 Gram(s) IV Push once  dextrose Oral Gel 15 Gram(s) Oral once  glucagon  Injectable 1 milliGRAM(s) IntraMuscular once  pantoprazole  Injectable 40 milliGRAM(s) IV Push every 12 hours    MEDICATIONS  (PRN):  acetaminophen     Tablet .. 650 milliGRAM(s) Oral every 6 hours PRN Temp greater or equal to 38C (100.4F), Mild Pain (1 - 3)  aluminum hydroxide/magnesium hydroxide/simethicone Suspension 30 milliLiter(s) Oral every 4 hours PRN Dyspepsia  melatonin 3 milliGRAM(s) Oral at bedtime PRN Insomnia  ondansetron Injectable 4 milliGRAM(s) IV Push every 8 hours PRN Nausea and/or Vomiting                            8.5    11.26 )-----------( 403      ( 04 Nov 2024 05:16 )             26.5       11-04    143  |  114[H]  |  15  ----------------------------<  102[H]  3.8   |  26  |  0.54    Ca    8.2[L]      04 Nov 2024 05:16  Phos  2.5     11-04  Mg     2.3     11-04    TPro  6.3  /  Alb  1.7[L]  /  TBili  0.7  /  DBili  x   /  AST  20  /  ALT  16  /  AlkPhos  231[H]  11-04

## 2024-11-04 NOTE — PROGRESS NOTE ADULT - NEGATIVE GENERAL GENITOURINARY SYMPTOMS
no hematuria/no renal colic/no flank pain L/no incontinence/no dysuria
no hematuria/no renal colic/no flank pain L/no incontinence/no dysuria

## 2024-11-04 NOTE — DISCHARGE NOTE NURSING/CASE MANAGEMENT/SOCIAL WORK - NSDCFUADDAPPT_GEN_ALL_CORE_FT
APPTS ARE READY TO BE MADE: [X] YES    Best Family or Patient Contact (if needed): Son, Shon Solis [phone]    Additional Information about above appointments (if needed):    1: GI for EGD in 1-2 weeks  2: PCP in 1 week  3. Heme/Onc Dr. Posey routine follow up

## 2024-11-04 NOTE — DISCHARGE NOTE PROVIDER - CONDITIONS AT DISCHARGE
Pt seen at bedside with  and Mandarin   [place] ID #[id number]. Plan discussed with Attending who is in agreement that patient is stable for discharge today

## 2024-11-04 NOTE — DISCHARGE NOTE PROVIDER - NSDCMRMEDTOKEN_GEN_ALL_CORE_FT
Akynzeo Injection 235 mg-0.25 mg/20 mL intravenous solution: 0.25 milligram(s) intravenously every 28 days day 1, day 8, day 15  Aranesp 200 mcg/0.4 mL injectable solution: 200 microgram(s) subcutaneously every 2 weeks  Aranesp 300 mcg/0.6 mL injectable solution: 300 microgram(s) subcutaneously every 3 weeks  Eliquis 2.5 mg oral tablet: 1 tab(s) orally 2 times a day  megestrol 40 mg/mL oral suspension: 5 milliliter(s) orally once a day  Neulasta 6 mg/0.6 mL subcutaneous solution: 6 milligram(s) subcutaneously apply on body injector after chemo for one dose  PACLitaxel protein-bound 100 mg intravenous injection: 121 milligram(s) intravenously every 28 days day 1, day 8, day 15  pancrelipase 24,000 units-76,000 units-120,000 units oral delayed release capsule: 1 cap(s) orally every 8 hours  Protonix 40 mg oral delayed release tablet: 1 tab(s) orally once a day Take on an empty stomach  Retacrit 40,000 units/mL preservative-free injectable solution: 40,000 unit(s) subcutaneously once a week  Venofer 20 mg/mL intravenous solution: 100 milligram(s) intravenously

## 2024-11-04 NOTE — DISCHARGE NOTE PROVIDER - PROVIDER TOKENS
PROVIDER:[TOKEN:[[id number]],FOLLOWUP:[1 week],ESTABLISHEDPATIENT:[T]],PROVIDER:[TOKEN:[[id number]],FOLLOWUP:[1 week],ESTABLISHEDPATIENT:[T]],PROVIDER:[TOKEN:[[id number]],FOLLOWUP:[1 week],ESTABLISHEDPATIENT:[T]]

## 2024-11-06 LAB — PROINSULIN SERPL-MCNC: 2.6 PMOL/L — SIGNIFICANT CHANGE UP (ref 0–10)

## 2024-11-11 ENCOUNTER — EMERGENCY (EMERGENCY)
Facility: HOSPITAL | Age: 74
LOS: 1 days | Discharge: ANOTHER HOSPITAL | End: 2024-11-11
Attending: STUDENT IN AN ORGANIZED HEALTH CARE EDUCATION/TRAINING PROGRAM
Payer: MEDICARE

## 2024-11-11 VITALS
RESPIRATION RATE: 26 BRPM | SYSTOLIC BLOOD PRESSURE: 126 MMHG | WEIGHT: 108.03 LBS | TEMPERATURE: 99 F | DIASTOLIC BLOOD PRESSURE: 75 MMHG | OXYGEN SATURATION: 56 % | HEIGHT: 59 IN | HEART RATE: 130 BPM

## 2024-11-11 VITALS
OXYGEN SATURATION: 93 % | DIASTOLIC BLOOD PRESSURE: 74 MMHG | SYSTOLIC BLOOD PRESSURE: 138 MMHG | TEMPERATURE: 98 F | RESPIRATION RATE: 24 BRPM | HEART RATE: 93 BPM

## 2024-11-11 PROBLEM — D64.9 ANEMIA, UNSPECIFIED: Chronic | Status: ACTIVE | Noted: 2024-11-01

## 2024-11-11 PROBLEM — C25.9 MALIGNANT NEOPLASM OF PANCREAS, UNSPECIFIED: Chronic | Status: ACTIVE | Noted: 2024-11-01

## 2024-11-11 PROBLEM — I10 ESSENTIAL (PRIMARY) HYPERTENSION: Chronic | Status: ACTIVE | Noted: 2024-11-01

## 2024-11-11 LAB
ACANTHOCYTES BLD QL SMEAR: SLIGHT — SIGNIFICANT CHANGE UP
ALBUMIN SERPL ELPH-MCNC: 1.6 G/DL — LOW (ref 3.5–5)
ALP SERPL-CCNC: 247 U/L — HIGH (ref 40–120)
ALT FLD-CCNC: 21 U/L DA — SIGNIFICANT CHANGE UP (ref 10–60)
ANION GAP SERPL CALC-SCNC: 11 MMOL/L — SIGNIFICANT CHANGE UP (ref 5–17)
APTT BLD: 34.3 SEC — SIGNIFICANT CHANGE UP (ref 24.5–35.6)
AST SERPL-CCNC: 49 U/L — HIGH (ref 10–40)
BASE EXCESS BLDV CALC-SCNC: -8.1 MMOL/L — SIGNIFICANT CHANGE UP
BASOPHILS # BLD AUTO: 0 K/UL — SIGNIFICANT CHANGE UP (ref 0–0.2)
BASOPHILS # BLD AUTO: 0.04 K/UL — SIGNIFICANT CHANGE UP (ref 0–0.2)
BASOPHILS NFR BLD AUTO: 0 % — SIGNIFICANT CHANGE UP (ref 0–2)
BASOPHILS NFR BLD AUTO: 0.2 % — SIGNIFICANT CHANGE UP (ref 0–2)
BILIRUB SERPL-MCNC: 0.7 MG/DL — SIGNIFICANT CHANGE UP (ref 0.2–1.2)
BLD GP AB SCN SERPL QL: SIGNIFICANT CHANGE UP
BUN SERPL-MCNC: 26 MG/DL — HIGH (ref 7–18)
BURR CELLS BLD QL SMEAR: SLIGHT — SIGNIFICANT CHANGE UP
CA-I SERPL-SCNC: SIGNIFICANT CHANGE UP MMOL/L (ref 1.15–1.33)
CALCIUM SERPL-MCNC: 8.3 MG/DL — LOW (ref 8.4–10.5)
CHLORIDE SERPL-SCNC: 113 MMOL/L — HIGH (ref 96–108)
CO2 SERPL-SCNC: 17 MMOL/L — LOW (ref 22–31)
CREAT SERPL-MCNC: 0.91 MG/DL — SIGNIFICANT CHANGE UP (ref 0.5–1.3)
DACRYOCYTES BLD QL SMEAR: SLIGHT — SIGNIFICANT CHANGE UP
EGFR: 66 ML/MIN/1.73M2 — SIGNIFICANT CHANGE UP
ELLIPTOCYTES BLD QL SMEAR: SLIGHT — SIGNIFICANT CHANGE UP
EOSINOPHIL # BLD AUTO: 0 K/UL — SIGNIFICANT CHANGE UP (ref 0–0.5)
EOSINOPHIL # BLD AUTO: 0.01 K/UL — SIGNIFICANT CHANGE UP (ref 0–0.5)
EOSINOPHIL NFR BLD AUTO: 0 % — SIGNIFICANT CHANGE UP (ref 0–6)
EOSINOPHIL NFR BLD AUTO: 0 % — SIGNIFICANT CHANGE UP (ref 0–6)
FLUAV AG NPH QL: SIGNIFICANT CHANGE UP
FLUBV AG NPH QL: SIGNIFICANT CHANGE UP
GAS PNL BLDA: SIGNIFICANT CHANGE UP
GAS PNL BLDV: 137 MMOL/L — SIGNIFICANT CHANGE UP (ref 136–145)
GAS PNL BLDV: SIGNIFICANT CHANGE UP
GLUCOSE SERPL-MCNC: 168 MG/DL — HIGH (ref 70–99)
HCO3 BLDV-SCNC: 16 MMOL/L — LOW (ref 22–29)
HCT VFR BLD CALC: 22.6 % — LOW (ref 34.5–45)
HCT VFR BLD CALC: 26 % — LOW (ref 34.5–45)
HGB BLD-MCNC: 6.8 G/DL — CRITICAL LOW (ref 11.5–15.5)
HGB BLD-MCNC: 7.9 G/DL — LOW (ref 11.5–15.5)
HYPOCHROMIA BLD QL: SIGNIFICANT CHANGE UP
IMM GRANULOCYTES NFR BLD AUTO: 1.4 % — HIGH (ref 0–0.9)
INR BLD: 2.14 RATIO — HIGH (ref 0.85–1.16)
LACTATE BLDV-MCNC: 7.6 MMOL/L — CRITICAL HIGH (ref 0.5–2)
LACTATE SERPL-SCNC: 2.9 MMOL/L — HIGH (ref 0.7–2)
LACTATE SERPL-SCNC: 7.2 MMOL/L — CRITICAL HIGH (ref 0.7–2)
LG PLATELETS BLD QL AUTO: SIGNIFICANT CHANGE UP
LYMPHOCYTES # BLD AUTO: 0.67 K/UL — LOW (ref 1–3.3)
LYMPHOCYTES # BLD AUTO: 1.31 K/UL — SIGNIFICANT CHANGE UP (ref 1–3.3)
LYMPHOCYTES # BLD AUTO: 3.2 % — LOW (ref 13–44)
LYMPHOCYTES # BLD AUTO: 5 % — LOW (ref 13–44)
MANUAL SMEAR VERIFICATION: SIGNIFICANT CHANGE UP
MCHC RBC-ENTMCNC: 25.6 PG — LOW (ref 27–34)
MCHC RBC-ENTMCNC: 26.2 PG — LOW (ref 27–34)
MCHC RBC-ENTMCNC: 30.1 G/DL — LOW (ref 32–36)
MCHC RBC-ENTMCNC: 30.4 G/DL — LOW (ref 32–36)
MCV RBC AUTO: 85 FL — SIGNIFICANT CHANGE UP (ref 80–100)
MCV RBC AUTO: 86.1 FL — SIGNIFICANT CHANGE UP (ref 80–100)
MONOCYTES # BLD AUTO: 0.76 K/UL — SIGNIFICANT CHANGE UP (ref 0–0.9)
MONOCYTES # BLD AUTO: 1.58 K/UL — HIGH (ref 0–0.9)
MONOCYTES NFR BLD AUTO: 3.7 % — SIGNIFICANT CHANGE UP (ref 2–14)
MONOCYTES NFR BLD AUTO: 6 % — SIGNIFICANT CHANGE UP (ref 2–14)
NEUTROPHILS # BLD AUTO: 18.87 K/UL — HIGH (ref 1.8–7.4)
NEUTROPHILS # BLD AUTO: 23.4 K/UL — HIGH (ref 1.8–7.4)
NEUTROPHILS NFR BLD AUTO: 89 % — HIGH (ref 43–77)
NEUTROPHILS NFR BLD AUTO: 91.5 % — HIGH (ref 43–77)
NRBC # BLD: 0 /100 WBCS — SIGNIFICANT CHANGE UP (ref 0–0)
NRBC # BLD: 0 /100 WBCS — SIGNIFICANT CHANGE UP (ref 0–0)
NT-PROBNP SERPL-SCNC: 2934 PG/ML — HIGH (ref 0–450)
OVALOCYTES BLD QL SMEAR: SLIGHT — SIGNIFICANT CHANGE UP
PCO2 BLDV: 28 MMHG — LOW (ref 39–42)
PH BLDV: 7.37 — SIGNIFICANT CHANGE UP (ref 7.32–7.43)
PLAT MORPH BLD: NORMAL — SIGNIFICANT CHANGE UP
PLATELET # BLD AUTO: 174 K/UL — SIGNIFICANT CHANGE UP (ref 150–400)
PLATELET # BLD AUTO: 248 K/UL — SIGNIFICANT CHANGE UP (ref 150–400)
PLATELET COUNT - ESTIMATE: NORMAL — SIGNIFICANT CHANGE UP
PO2 BLDV: 46 MMHG — SIGNIFICANT CHANGE UP
POIKILOCYTOSIS BLD QL AUTO: SLIGHT — SIGNIFICANT CHANGE UP
POTASSIUM BLDV-SCNC: 3.6 MMOL/L — SIGNIFICANT CHANGE UP (ref 3.5–5.1)
POTASSIUM SERPL-MCNC: 3.3 MMOL/L — LOW (ref 3.5–5.3)
POTASSIUM SERPL-SCNC: 3.3 MMOL/L — LOW (ref 3.5–5.3)
PROT SERPL-MCNC: 7 G/DL — SIGNIFICANT CHANGE UP (ref 6–8.3)
PROTHROM AB SERPL-ACNC: 24.6 SEC — HIGH (ref 9.9–13.4)
RBC # BLD: 2.66 M/UL — LOW (ref 3.8–5.2)
RBC # BLD: 3.02 M/UL — LOW (ref 3.8–5.2)
RBC # FLD: 19.6 % — HIGH (ref 10.3–14.5)
RBC # FLD: 19.7 % — HIGH (ref 10.3–14.5)
RBC BLD AUTO: ABNORMAL
RSV RNA NPH QL NAA+NON-PROBE: SIGNIFICANT CHANGE UP
SAO2 % BLDV: 71.4 % — SIGNIFICANT CHANGE UP
SARS-COV-2 RNA SPEC QL NAA+PROBE: SIGNIFICANT CHANGE UP
SMUDGE CELLS # BLD: PRESENT — SIGNIFICANT CHANGE UP
SODIUM SERPL-SCNC: 141 MMOL/L — SIGNIFICANT CHANGE UP (ref 135–145)
TROPONIN I, HIGH SENSITIVITY RESULT: 47.4 NG/L — SIGNIFICANT CHANGE UP
WBC # BLD: 20.64 K/UL — HIGH (ref 3.8–10.5)
WBC # BLD: 26.29 K/UL — HIGH (ref 3.8–10.5)
WBC # FLD AUTO: 20.64 K/UL — HIGH (ref 3.8–10.5)
WBC # FLD AUTO: 26.29 K/UL — HIGH (ref 3.8–10.5)

## 2024-11-11 PROCEDURE — 71045 X-RAY EXAM CHEST 1 VIEW: CPT | Mod: 26

## 2024-11-11 PROCEDURE — 83880 ASSAY OF NATRIURETIC PEPTIDE: CPT

## 2024-11-11 PROCEDURE — 93005 ELECTROCARDIOGRAM TRACING: CPT

## 2024-11-11 PROCEDURE — 87040 BLOOD CULTURE FOR BACTERIA: CPT

## 2024-11-11 PROCEDURE — 36430 TRANSFUSION BLD/BLD COMPNT: CPT

## 2024-11-11 PROCEDURE — 85610 PROTHROMBIN TIME: CPT

## 2024-11-11 PROCEDURE — 74177 CT ABD & PELVIS W/CONTRAST: CPT | Mod: MC

## 2024-11-11 PROCEDURE — 36415 COLL VENOUS BLD VENIPUNCTURE: CPT

## 2024-11-11 PROCEDURE — 99291 CRITICAL CARE FIRST HOUR: CPT

## 2024-11-11 PROCEDURE — 86850 RBC ANTIBODY SCREEN: CPT

## 2024-11-11 PROCEDURE — 84484 ASSAY OF TROPONIN QUANT: CPT

## 2024-11-11 PROCEDURE — 83605 ASSAY OF LACTIC ACID: CPT

## 2024-11-11 PROCEDURE — 96374 THER/PROPH/DIAG INJ IV PUSH: CPT | Mod: XU

## 2024-11-11 PROCEDURE — 71275 CT ANGIOGRAPHY CHEST: CPT | Mod: 26,MC

## 2024-11-11 PROCEDURE — 84295 ASSAY OF SERUM SODIUM: CPT

## 2024-11-11 PROCEDURE — 74177 CT ABD & PELVIS W/CONTRAST: CPT | Mod: 26,MC

## 2024-11-11 PROCEDURE — 99291 CRITICAL CARE FIRST HOUR: CPT | Mod: 25

## 2024-11-11 PROCEDURE — 99292 CRITICAL CARE ADDL 30 MIN: CPT

## 2024-11-11 PROCEDURE — 87637 SARSCOV2&INF A&B&RSV AMP PRB: CPT

## 2024-11-11 PROCEDURE — 84132 ASSAY OF SERUM POTASSIUM: CPT

## 2024-11-11 PROCEDURE — 86901 BLOOD TYPING SEROLOGIC RH(D): CPT

## 2024-11-11 PROCEDURE — P9040: CPT

## 2024-11-11 PROCEDURE — 86900 BLOOD TYPING SEROLOGIC ABO: CPT

## 2024-11-11 PROCEDURE — 85730 THROMBOPLASTIN TIME PARTIAL: CPT

## 2024-11-11 PROCEDURE — 86923 COMPATIBILITY TEST ELECTRIC: CPT

## 2024-11-11 PROCEDURE — 96375 TX/PRO/DX INJ NEW DRUG ADDON: CPT | Mod: XU

## 2024-11-11 PROCEDURE — 71045 X-RAY EXAM CHEST 1 VIEW: CPT

## 2024-11-11 PROCEDURE — 80053 COMPREHEN METABOLIC PANEL: CPT

## 2024-11-11 PROCEDURE — 85025 COMPLETE CBC W/AUTO DIFF WBC: CPT

## 2024-11-11 PROCEDURE — 82803 BLOOD GASES ANY COMBINATION: CPT

## 2024-11-11 PROCEDURE — 71275 CT ANGIOGRAPHY CHEST: CPT | Mod: MC

## 2024-11-11 PROCEDURE — 82330 ASSAY OF CALCIUM: CPT

## 2024-11-11 RX ORDER — SODIUM CHLORIDE 9 MG/ML
2000 INJECTION, SOLUTION INTRAMUSCULAR; INTRAVENOUS; SUBCUTANEOUS ONCE
Refills: 0 | Status: COMPLETED | OUTPATIENT
Start: 2024-11-11 | End: 2024-11-11

## 2024-11-11 RX ORDER — PIPERACILLIN AND TAZOBACTAM .5; 4 G/20ML; G/20ML
3.38 INJECTION, POWDER, LYOPHILIZED, FOR SOLUTION INTRAVENOUS ONCE
Refills: 0 | Status: COMPLETED | OUTPATIENT
Start: 2024-11-11 | End: 2024-11-11

## 2024-11-11 RX ORDER — POTASSIUM CHLORIDE 10 MEQ
40 TABLET, EXTENDED RELEASE ORAL ONCE
Refills: 0 | Status: COMPLETED | OUTPATIENT
Start: 2024-11-11 | End: 2024-11-11

## 2024-11-11 RX ORDER — PANTOPRAZOLE SODIUM 40 MG/1
80 TABLET, DELAYED RELEASE ORAL ONCE
Refills: 0 | Status: COMPLETED | OUTPATIENT
Start: 2024-11-11 | End: 2024-11-11

## 2024-11-11 RX ORDER — VANCOMYCIN HYDROCHLORIDE 50 MG/ML
1000 KIT ORAL ONCE
Refills: 0 | Status: COMPLETED | OUTPATIENT
Start: 2024-11-11 | End: 2024-11-11

## 2024-11-11 RX ADMIN — VANCOMYCIN HYDROCHLORIDE 250 MILLIGRAM(S): KIT at 16:08

## 2024-11-11 RX ADMIN — PANTOPRAZOLE SODIUM 80 MILLIGRAM(S): 40 TABLET, DELAYED RELEASE ORAL at 14:27

## 2024-11-11 RX ADMIN — SODIUM CHLORIDE 2000 MILLILITER(S): 9 INJECTION, SOLUTION INTRAMUSCULAR; INTRAVENOUS; SUBCUTANEOUS at 15:10

## 2024-11-11 RX ADMIN — PIPERACILLIN AND TAZOBACTAM 200 GRAM(S): .5; 4 INJECTION, POWDER, LYOPHILIZED, FOR SOLUTION INTRAVENOUS at 15:11

## 2024-11-11 NOTE — PATIENT PROFILE ADULT - FALL HARM RISK - HARM RISK INTERVENTIONS

## 2024-11-11 NOTE — ED ADULT NURSE NOTE - OBJECTIVE STATEMENT
Covering AGNES Prather- walk-in notification from home, as per grandson, c/o black tarry stools x yesterday. Patient currently on Eliquis for bilateral leg DVT. Upon assessment, bilateral legs swollen. Chemo port noted to right upper chest. MD Chun at bedside.

## 2024-11-11 NOTE — H&P ADULT - ASSESSMENT
====================ASSESSMENT ======================  73 y/o F with PMH of pancreatic cancer on chemotherapy, DVT on Eliquis 2.5mg BID, HTN, recent admission for symptomatic anemia requiring blood transfusion presenting with SOB as well as dark stools. F/w saddle PE on CTA chest.     Plan:  ====================== NEUROLOGY=====================  No active issues; A&Ox4  - neuro checks per CICU protocol    ==================== RESPIRATORY======================  #AHRF  - on high flow 50/100 and non rebreather  - full code   - trend ABGs    ====================CARDIOVASCULAR==================  # saddle PE  - without R heart strain  - unable to be on systemic AC 2/2 low hgb   - lactate elev to 7.2, now cleared 1.3  - proBNP 2900+     ===================HEMATOLOGIC/ONC ===================  #Anemia  - p/w dark tarry stools  - transfuse for hgb > 7     #pancreatic CA  - on chemo     ===================== RENAL =========================  No active issues  - monitor SCr, UOP  - monitor electrolytes and replete for goal K 4-4.5 and Mg >2     ==================== GASTROINTESTINAL===================  #NPO     =======================    ENDOCRINE  =====================  No active issues  - monitor blood glucose  - pending a1c, lipid profile     ========================INFECTIOUS DISEASE================  #entercolitis ? on CT, leukocytosis   - pending Bcx x2  - zosyn 11/11-       Dispo: Maintain in ICU.    Patient requires continuous monitoring with bedside rhythm monitoring, arterial line, pulse oximetry, ventilator monitoring and intermittent blood gas analysis.  Care plan discussed with ICU care team.  I have spent 35 minutes providing critical care, in addition to initial critical time provided by CICU attending   Dr. Sam , re-evaluated multiple times during the day.    Isa Kaufman PA-C  ====================ASSESSMENT ======================  73 y/o F with PMH of pancreatic cancer on chemotherapy, DVT on Eliquis 2.5mg BID, HTN, recent admission for symptomatic anemia requiring blood transfusion presenting with SOB as well as dark stools. F/w saddle PE on CTA chest.     Plan:  ====================== NEUROLOGY=====================  No active issues; A&Ox4  - neuro checks per CICU protocol    ==================== RESPIRATORY======================  #AHRF  - on high flow 50/100 and non rebreather  - full code   - trend ABGs   - s/p lasix 40 IVP     ====================CARDIOVASCULAR==================  # saddle PE  - without R heart strain  - unable to be on systemic AC 2/2 low hgb   - lactate elev to 7.2, now cleared 1.3  - proBNP 2900+   - trial heparin gtt for AC     ===================HEMATOLOGIC/ONC ===================  #Anemia  - p/w dark tarry stools  - transfuse for hgb > 7   - s/p 1u pRBC on 11/11     #pancreatic CA  - on chemo     DVT ppx: heparin gtt     ===================== RENAL =========================  No active issues  - monitor SCr, UOP  - monitor electrolytes and replete for goal K 4-4.5 and Mg >2     ==================== GASTROINTESTINAL===================  #NPO     =======================    ENDOCRINE  =====================  No active issues  - monitor blood glucose  - pending a1c, lipid profile     ========================INFECTIOUS DISEASE================  # enterocolitis ? on CT, leukocytosis   - pending Bcx x2   - zosyn 11/11-       Dispo: Maintain in ICU.    Patient requires continuous monitoring with bedside rhythm monitoring, arterial line, pulse oximetry, ventilator monitoring and intermittent blood gas analysis.  Care plan discussed with ICU care team.  I have spent 35 minutes providing critical care, in addition to initial critical time provided by CICU attending   Dr. Sam , re-evaluated multiple times during the day.    Isa Kaufman PA-C  ====================ASSESSMENT ======================  73 y/o F with PMH of pancreatic cancer on chemotherapy, DVT on Eliquis 2.5mg BID, HTN, recent admission for symptomatic anemia requiring blood transfusion presenting with SOB as well as dark stools. F/w saddle PE on CTA chest.     Plan:  ====================== NEUROLOGY=====================  No active issues; A&Ox4  - neuro checks per CICU protocol    ==================== RESPIRATORY======================  #AHRF  - on high flow 50/100 and non rebreather  - full code   - trend ABGs   - s/p lasix 40 IVP     ====================CARDIOVASCULAR==================  # saddle PE  - without R heart strain  - lactate elev to 7.2, now cleared 1.3  - proBNP 2900+   - trial heparin gtt for AC     ===================HEMATOLOGIC/ONC ===================  #Anemia  - p/w dark tarry stools  - transfuse for hgb > 7   - s/p 1u pRBC on 11/11     #pancreatic CA  - on chemo     DVT ppx: heparin gtt     ===================== RENAL =========================  No active issues  - monitor SCr, UOP  - monitor electrolytes and replete for goal K 4-4.5 and Mg >2     ==================== GASTROINTESTINAL===================  #NPO     =======================    ENDOCRINE  =====================  No active issues  - monitor blood glucose  - pending a1c, lipid profile     ========================INFECTIOUS DISEASE================  # enterocolitis ? on CT, leukocytosis   - pending Bcx x2   - zosyn 11/11-       Dispo: Maintain in ICU.    Patient requires continuous monitoring with bedside rhythm monitoring, arterial line, pulse oximetry, ventilator monitoring and intermittent blood gas analysis.  Care plan discussed with ICU care team.  I have spent 35 minutes providing critical care, in addition to initial critical time provided by CICU attending   Dr. Sam , re-evaluated multiple times during the day.    Isa Kaufman PA-C

## 2024-11-11 NOTE — ED ADULT NURSE NOTE - NSFALLHARMRISKINTERV_ED_ALL_ED

## 2024-11-11 NOTE — ED PROVIDER NOTE - PHYSICAL EXAMINATION
Gen: Pale. In moderate distress   Head: normocephalic, atraumatic  Lung: Bilateral  rhonchi, satting 75-80% on RA, improved to 99% on 4L NC, not tachypneic, in moderate distress   CV: normal s1/s2, rrr,   Abd: soft, non-tender, non-distended, no rebound guarding or peritoneal signs   MSK: full range of motion in all 4 extremities  Neuro: No focal neurologic deficits

## 2024-11-11 NOTE — ED ADULT NURSE REASSESSMENT NOTE - NS ED NURSE REASSESS COMMENT FT1
1955: PT in stable condition, No sign of respiratory distress noted. PRBs transfusion finished at 1935. No sign of adverse reaction noted. PT transferred to [facility], with NRB mask as per MD Carrasco orders. BIPAP on standby. Grandson at bed side. [facility] bus # [id number].

## 2024-11-11 NOTE — H&P ADULT - NSHPPHYSICALEXAM_GEN_ALL_CORE
General: Well nourished, well developed, NAD  Neurology/Psychiatric: A&Ox3, nonfocal, BARRIOS x 4. Mood and affect appropriate for situation  Respiratory: Breath sounds CTA in all lung fields b/l. No rhonchi, rales, wheezes  CV: RRR, S1, S2. No murmurs, rubs or gallops. No JVD  Abdominal: Soft, non-tender, non-distended. normoactive BS. No CVAT  Extremities: No peripheral edema, 2+ peripheral pulses in UE/LE b/l  Skin: No rashes, lesions, ulcers or discoloration

## 2024-11-11 NOTE — ED PROVIDER NOTE - CRITICAL CARE ATTENDING CONTRIBUTION TO CARE
Sepsis requiring IV fluids and antibiotics, hypoxemic respiratory failure requiring consultation with respiratory and high flow nasal cannula, transfusion, information obtained from patient chart and family Sepsis requiring IV fluids and antibiotics, hypoxemic respiratory failure requiring consultation with respiratory and high flow nasal cannula, transfusion, information obtained from patient chart and family    CC Time 75 min

## 2024-11-11 NOTE — ED PROVIDER NOTE - CARE PLAN
Principal Discharge DX:	Acute hypoxic respiratory failure   1 Principal Discharge DX:	Acute hypoxic respiratory failure  Secondary Diagnosis:	Saddle pulmonary embolus  Secondary Diagnosis:	Sepsis

## 2024-11-11 NOTE — H&P ADULT - HISTORY OF PRESENT ILLNESS
73 y/o F with PMH of pancreatic cancer on chemotherapy, DVT on Eliquis 2.5mg BID, HTN, recent admission for symptomatic anemia requiring blood transfusion presenting with SOB as well as dark stools. As per patient and grandson at bedside, patient was recently discharged on Eliquis after finding DVT.  Patient was also admitted for symptomatic anemia requiring blood transfusions, however did not receive an EGD and was discharged for outpatient follow-up with Protonix.  Patient developed some shortness of breath and was also having intermittent dark and bloody stools.  Denies any chest pain.

## 2024-11-11 NOTE — PATIENT PROFILE ADULT - FALL HARM RISK - PATIENT NEEDS ASSISTANCE
Tommy Garcia M.D.,  [facility], [facility]  -----------------    < Scott after DC, [phone]    Mary STEPHENS  [phone]             
Standing/Walking/Toileting/Moving from bed to chair

## 2024-11-11 NOTE — ED PROVIDER NOTE - CLINICAL SUMMARY MEDICAL DECISION MAKING FREE TEXT BOX
Patient is a  74-year-old female with past medical history pancreatic cancer on chemotherapy, DVT on Eliquis, hypertension, recent admission for symptomatic anemia requiring blood transfusion  presenting with difficulty  breathing as well as dark stools.  Normotensive, tachycardic, a febrile, Bilateral  rhonchi, satting 75-80% on RA, improved to 99% on 4L NC, not tachypneic, in moderate distress.   -  Will give broad-spectrum antibiotics, check labs, rule out electrolyte abnormalities and blood loss anemia, cultures to rule out bacteremia, lactate to evaluate for end organ dysfunction, troponin EKG to assess for ACS versus arrhythmia, likely transfuse, chest x-ray BNP to eval for heart failure with pneumonia, CT angio A/P to rule out acute bleed versus diverticular bleed, CT angio PE study to rule out pulmonary embolism, admit for hypoxemic respiratory failure.

## 2024-11-11 NOTE — ED PROVIDER NOTE - OBJECTIVE STATEMENT
Patient is a  74-year-old female with past medical history pancreatic cancer on chemotherapy, DVT on Eliquis, hypertension, recent admission for symptomatic anemia requiring blood transfusion  presenting with difficulty  breathing as well as dark stools.  as per patient and grandson at bedside translating for patient, patient was recently discharged on Eliquis after finding DVT.  Patient was also admitted for symptomatic anemia requiring blood transfusions however did not receive an EGD and was discharged for outpatient follow-up with Protonix.  Patient developed some shortness of breath and was also having intermittent dark and bloody stools.  Denies any chest pain.

## 2024-11-11 NOTE — ED PROVIDER NOTE - PROGRESS NOTE DETAILS
Signed out to follow-up CT.  CT reveals saddle embolus.  Hemoglobin decreased.  Patient receiving a unit of blood.  Patient is currently on high flow nasal cannula at FiO2 of 100 and nonrebreather satting high 90s.  On high flow nasal cannula with a nonrebreather patient satting in the mid 80s.  Case discussed with the [facility]  via transfer team patient is accepted for transfer to CCU at [place].  No active rectal bleeding appreciated in the ED at this time.

## 2024-11-11 NOTE — PATIENT PROFILE ADULT - ...
Echo    Left Ventricle: The left ventricle is normal in size. Normal wall thickness. Regional wall motion abnormalities present. See diagram for wall motion findings. Septal motion is consistent with pacing. There is mildly reduced systolic function with a visually estimated ejection fraction of 40 - 45%. Grade III diastolic dysfunction.    Right Ventricle: Moderate to severe right ventricular enlargement. Systolic function is reduced.TAPSE is 1.59 cm. Pacemaker lead present in the ventricle.    Left Atrium: Left atrium is severely dilated. The left atrium volume index is 71.4 mL/m2.    Right Atrium: Right atrium is severely dilated.    Aortic Valve: There is mild aortic valve sclerosis. There is mild to moderate stenosis. Aortic valve area by VTI is 1.49 cm². Aortic valve peak velocity is 1.43 m/s. Mean gradient is 5 mmHg. The dimensionless index is 0.52.    Mitral Valve: There is mild regurgitation.    Tricuspid Valve: There is mild to moderate regurgitation.    Pulmonary Artery: The estimated pulmonary artery systolic pressure is 64 mmHg.    IVC/SVC: Elevated venous pressure at 15 mmHg.        11-Nov-2024 21:35:35

## 2024-11-12 NOTE — CONSULT NOTE ADULT - ASSESSMENT
75 y/o F with PMH of pancreatic cancer on chemotherapy, DVT on Eliquis 2.5mg BID, HTN, recent admission for symptomatic anemia requiring blood transfusion presenting with SOB as well as dark stools found to have a PE.       As per patient and grandson at bedside, patient was recently discharged on Eliquis after finding DVT.  Patient was also admitted for symptomatic anemia requiring blood transfusions, however did not receive an EGD and was discharged for outpatient follow-up with Protonix.  Patient developed some shortness of breath and was also having intermittent dark and bloody stools.  Denies any chest pain. Heme/onc consulted on this patient of Dr. Posey at [facility]. She is on treatment for Adenocarcinoma of the pancreatic head with Gemzar/Abraxane, LD 10/18 and Aranesp 200 mcg LD 11/1.     Pancreatic Cancer   - on gem/abraxane (LD on 10/18/24)  - follows Dr. Posey [facility]   - no plans for inpatient treatment     Severe anemia, dark stool  - Recently at [place], received pRBC transfusions and was planned for outpatient EGD  - Received Aranesp 200 mcg LD 11/1.   - GI following, Outpatient EGD and colonoscopy once more medically stable and optimized   - Iron studies were low 11/1: iron 8, TIBC 162, % sat 5, ferritin 396; consider iron supplementation  - Recommend transfusion for hgb < 7    B/l DVT, new PE  - DVT diagnosed on 10/28  - started on eliquis which was held then dose reduced to 2.5 mg on recent admission   - CTA CAP: Saddle pulmonary embolus. No evidence of right heart strain. Diffuse symmetric groundglass and consolidation which is nonspecific. Multilobulated cystic liver mass. Differential diagnosis includes metastasis or abscess. Diffuse colonic and small bowel wall thickening compatible with enterocolitis. 1 cm nodular filling defect in the portal vein, suggestive of thrombus, either tumor thrombus or bland thrombus.  - Vascular following, possible mechanical thrombectomy        will continue to follow.     Ramon Bryant PA-C  Hematology/Oncology  [facility]  [phone] (office)

## 2024-11-12 NOTE — PROGRESS NOTE ADULT - THIS PATIENT HAS THE FOLLOWING CONDITION(S)/DIAGNOSES ON THIS ADMISSION:
Saddle PE/None/Shock Saddle PE/Shock Saddle PE/Heart Failure/Acute Respiratory Failure/Acute Blood Loss Anemia

## 2024-11-12 NOTE — CONSULT NOTE ADULT - ATTENDING COMMENTS
Metastatic pancreatic cancer, s/p whipple two years ago now on chemo; frail; anemia; parenchymal lung disease.   -please get therapeutic on a/c; monitor for bleeding  -not a candidate for advanced therapies given underlying co-morbidities, anemia and frailty   -would touch base with Dr. Posey; unclear if CT scans with new findings or known previously
Agree with above. While patient has anemia and has not had endoscopic evaluation, she has no signs of overt bleeding. Benefits of A/C with massive PE outweighs risks as she has no active GI bleeding. From Gi standpoint, no absolute contraindication to proceed with anticoagulation.

## 2024-11-12 NOTE — CONSULT NOTE ADULT - NS ATTEND AMEND GEN_ALL_CORE FT
Patient seen and examined with the PA during rounds  I agree with her assessment and plan     pt with pancreatic ca on chemo  newly diagnosed saddle PE, on IV heparin, consideration for mechanical thrombectomy  Has anemia, will trend counts and monitor for bleeding  will follow

## 2024-11-12 NOTE — PROGRESS NOTE ADULT - SUBJECTIVE AND OBJECTIVE BOX
PATIENT:  KELLI SHELTON  [id number]    CHIEF COMPLAINT:  Patient is a 74y old  Female who presents with a chief complaint of saddle PE (12 Nov 2024 02:55)      INTERVAL HISTORY/OVERNIGHT EVENTS:  The patient was seen and examined at bedside.     Additional Review of Symptoms:     MEDICATIONS:  MEDICATIONS  (STANDING):  chlorhexidine 2% Cloths 1 Application(s) Topical <User Schedule>  heparin  Infusion 650 Unit(s)/Hr (8 mL/Hr) IV Continuous <Continuous>  pantoprazole  Injectable 40 milliGRAM(s) IV Push two times a day  piperacillin/tazobactam IVPB.- 3.375 Gram(s) IV Intermittent once  piperacillin/tazobactam IVPB.. 3.375 Gram(s) IV Intermittent every 8 hours    MEDICATIONS  (PRN):      ALLERGIES:  Allergies    No Known Allergies    Intolerances        OBJECTIVE:  ICU Vital Signs Last 24 Hrs  T(C): 36.8 (12 Nov 2024 05:00), Max: 37.2 (11 Nov 2024 13:55)  T(F): 98.3 (12 Nov 2024 05:00), Max: 99 (11 Nov 2024 13:55)  HR: 88 (12 Nov 2024 08:38) (84 - 130)  BP: 128/63 (12 Nov 2024 08:00) (100/60 - 159/68)  BP(mean): 90 (12 Nov 2024 08:00) (75 - 105)  ABP: --  ABP(mean): --  RR: 23 (12 Nov 2024 08:38) (18 - 29)  SpO2: 92% (12 Nov 2024 08:38) (56% - 100%)    O2 Parameters below as of 12 Nov 2024 08:38  Patient On (Oxygen Delivery Method): nasal cannula, high flow  O2 Flow (L/min): 50  O2 Concentration (%): 100        Adult Advanced Hemodynamics Last 24 Hrs  CVP(mm Hg): --  CVP(cm H2O): --  CO: --  CI: --  PA: --  PA(mean): --  PCWP: --  SVR: --  SVRI: --  PVR: --  PVRI: --  CAPILLARY BLOOD GLUCOSE        CAPILLARY BLOOD GLUCOSE        I&O's Summary    11 Nov 2024 07:01  -  12 Nov 2024 07:00  --------------------------------------------------------  IN: 178.5 mL / OUT: 1800 mL / NET: -1621.5 mL    12 Nov 2024 07:01  -  12 Nov 2024 08:59  --------------------------------------------------------  IN: 33 mL / OUT: 0 mL / NET: 33 mL      Daily Height in cm: 152.4 (11 Nov 2024 21:00)    Daily     PHYSICAL EXAMINATION:  General: WN/WD NAD  HEENT: PERRLA, EOMI, moist mucous membranes  Neurology: A&Ox3, nonfocal, BARRIOS x 4  Respiratory: CTA B/L, normal respiratory effort, no wheezes, crackles, rales  CV: RRR, S1S2, no murmurs, rubs or gallops  Abdominal: Soft, NT, ND +BS, Last BM  Extremities: No edema, + peripheral pulses  Incisions:   Tubes:    LABS:  ABG - ( 11 Nov 2024 21:15 )  pH, Arterial: 7.39  pH, Blood: x     /  pCO2: 36    /  pO2: 104   / HCO3: 22    / Base Excess: -2.8  /  SaO2: 97.4                                    8.8    21.85 )-----------( 211      ( 12 Nov 2024 05:24 )             28.7     11-11    138  |  110[H]  |  20  ----------------------------<  123[H]  3.7   |  18[L]  |  0.59    Ca    7.3[L]      11 Nov 2024 21:34  Phos  3.0     11-11  Mg     2.0     11-11    TPro  6.4  /  Alb  1.9[L]  /  TBili  1.1  /  DBili  x   /  AST  40  /  ALT  16  /  AlkPhos  217[H]  11-11    LIVER FUNCTIONS - ( 11 Nov 2024 21:34 )  Alb: 1.9 g/dL / Pro: 6.4 g/dL / ALK PHOS: 217 U/L / ALT: 16 U/L / AST: 40 U/L / GGT: x           PT/INR - ( 12 Nov 2024 05:24 )   PT: 20.1 sec;   INR: 1.76 ratio         PTT - ( 12 Nov 2024 05:24 )  PTT:34.3 sec        Urinalysis Basic - ( 11 Nov 2024 21:34 )    Color: x / Appearance: x / SG: x / pH: x  Gluc: 123 mg/dL / Ketone: x  / Bili: x / Urobili: x   Blood: x / Protein: x / Nitrite: x   Leuk Esterase: x / RBC: x / WBC x   Sq Epi: x / Non Sq Epi: x / Bacteria: x        TELEMETRY:     EKG:     IMAGING:       PATIENT:  KELLI SHELTON  [id number]    CHIEF COMPLAINT:  Patient is a 74y old  Female who presents with a chief complaint of saddle PE (12 Nov 2024 02:55)      INTERVAL HISTORY/OVERNIGHT EVENTS:  The patient was seen and examined at bedside.     Additional Review of Symptoms:     MEDICATIONS:  MEDICATIONS  (STANDING):  chlorhexidine 2% Cloths 1 Application(s) Topical <User Schedule>  heparin  Infusion 650 Unit(s)/Hr (8 mL/Hr) IV Continuous <Continuous>  pantoprazole  Injectable 40 milliGRAM(s) IV Push two times a day  piperacillin/tazobactam IVPB.- 3.375 Gram(s) IV Intermittent once  piperacillin/tazobactam IVPB.. 3.375 Gram(s) IV Intermittent every 8 hours    MEDICATIONS  (PRN):      ALLERGIES:  Allergies    No Known Allergies    Intolerances        OBJECTIVE:  ICU Vital Signs Last 24 Hrs  T(C): 36.8 (12 Nov 2024 05:00), Max: 37.2 (11 Nov 2024 13:55)  T(F): 98.3 (12 Nov 2024 05:00), Max: 99 (11 Nov 2024 13:55)  HR: 88 (12 Nov 2024 08:38) (84 - 130)  BP: 128/63 (12 Nov 2024 08:00) (100/60 - 159/68)  BP(mean): 90 (12 Nov 2024 08:00) (75 - 105)  ABP: --  ABP(mean): --  RR: 23 (12 Nov 2024 08:38) (18 - 29)  SpO2: 92% (12 Nov 2024 08:38) (56% - 100%)    O2 Parameters below as of 12 Nov 2024 08:38  Patient On (Oxygen Delivery Method): nasal cannula, high flow  O2 Flow (L/min): 50  O2 Concentration (%): 100    I&O's Summary    11 Nov 2024 07:01  -  12 Nov 2024 07:00  --------------------------------------------------------  IN: 178.5 mL / OUT: 1800 mL / NET: -1621.5 mL    12 Nov 2024 07:01  -  12 Nov 2024 08:59  --------------------------------------------------------  IN: 33 mL / OUT: 0 mL / NET: 33 mL      Daily Height in cm: 152.4 (11 Nov 2024 21:00)    Daily     PHYSICAL EXAMINATION:  General: WN/WD NAD  HEENT: PERRLA, EOMI  Neurology: A&Ox3, nonfocal, BARRIOS x 4  Respiratory: mild crackles to right lung base  CV: RRR  Abdominal: Soft, NT, distended  Extremities: pitting edema, + peripheral pulses    LABS:  ABG - ( 11 Nov 2024 21:15 )  pH, Arterial: 7.39  pH, Blood: x     /  pCO2: 36    /  pO2: 104   / HCO3: 22    / Base Excess: -2.8  /  SaO2: 97.4                                    8.8    21.85 )-----------( 211      ( 12 Nov 2024 05:24 )             28.7     11-11    138  |  110[H]  |  20  ----------------------------<  123[H]  3.7   |  18[L]  |  0.59    Ca    7.3[L]      11 Nov 2024 21:34  Phos  3.0     11-11  Mg     2.0     11-11    TPro  6.4  /  Alb  1.9[L]  /  TBili  1.1  /  DBili  x   /  AST  40  /  ALT  16  /  AlkPhos  217[H]  11-11    LIVER FUNCTIONS - ( 11 Nov 2024 21:34 )  Alb: 1.9 g/dL / Pro: 6.4 g/dL / ALK PHOS: 217 U/L / ALT: 16 U/L / AST: 40 U/L / GGT: x           PT/INR - ( 12 Nov 2024 05:24 )   PT: 20.1 sec;   INR: 1.76 ratio         PTT - ( 12 Nov 2024 05:24 )  PTT:34.3 sec        Urinalysis Basic - ( 11 Nov 2024 21:34 )    Color: x / Appearance: x / SG: x / pH: x  Gluc: 123 mg/dL / Ketone: x  / Bili: x / Urobili: x   Blood: x / Protein: x / Nitrite: x   Leuk Esterase: x / RBC: x / WBC x   Sq Epi: x / Non Sq Epi: x / Bacteria: x           PATIENT:  KELLI SHELTON  [id number]    CHIEF COMPLAINT:  Patient is a 74y old  Female who presents with a chief complaint of saddle PE (12 Nov 2024 02:55)      INTERVAL HISTORY/OVERNIGHT EVENTS:  The patient was seen and examined at bedside.     Additional Review of Symptoms:     MEDICATIONS:  MEDICATIONS  (STANDING):  chlorhexidine 2% Cloths 1 Application(s) Topical <User Schedule>  heparin  Infusion 650 Unit(s)/Hr (8 mL/Hr) IV Continuous <Continuous>  pantoprazole  Injectable 40 milliGRAM(s) IV Push two times a day  piperacillin/tazobactam IVPB.- 3.375 Gram(s) IV Intermittent once  piperacillin/tazobactam IVPB.. 3.375 Gram(s) IV Intermittent every 8 hours    MEDICATIONS  (PRN):      ALLERGIES:  Allergies    No Known Allergies    Intolerances        OBJECTIVE:  ICU Vital Signs Last 24 Hrs  T(C): 36.8 (12 Nov 2024 05:00), Max: 37.2 (11 Nov 2024 13:55)  T(F): 98.3 (12 Nov 2024 05:00), Max: 99 (11 Nov 2024 13:55)  HR: 88 (12 Nov 2024 08:38) (84 - 130)  BP: 128/63 (12 Nov 2024 08:00) (100/60 - 159/68)  BP(mean): 90 (12 Nov 2024 08:00) (75 - 105)  ABP: --  ABP(mean): --  RR: 23 (12 Nov 2024 08:38) (18 - 29)  SpO2: 92% (12 Nov 2024 08:38) (56% - 100%)    O2 Parameters below as of 12 Nov 2024 08:38  Patient On (Oxygen Delivery Method): nasal cannula, high flow  O2 Flow (L/min): 50  O2 Concentration (%): 100    I&O's Summary    11 Nov 2024 07:01  -  12 Nov 2024 07:00  --------------------------------------------------------  IN: 178.5 mL / OUT: 1800 mL / NET: -1621.5 mL    12 Nov 2024 07:01  -  12 Nov 2024 08:59  --------------------------------------------------------  IN: 33 mL / OUT: 0 mL / NET: 33 mL      Daily Height in cm: 152.4 (11 Nov 2024 21:00)    Daily     PHYSICAL EXAMINATION:  General: frail   HEENT: PERRLA, EOMI  Neurology: A&Ox3, nonfocal, BARRIOS x 4  Respiratory: mild crackles to right lung base  CV: RRR  Abdominal: Soft, NT, distended  Extremities: 2 + pitting edema, + peripheral pulses    LABS:  ABG - ( 11 Nov 2024 21:15 )  pH, Arterial: 7.39  pH, Blood: x     /  pCO2: 36    /  pO2: 104   / HCO3: 22    / Base Excess: -2.8  /  SaO2: 97.4                                    8.8    21.85 )-----------( 211      ( 12 Nov 2024 05:24 )             28.7     11-11    138  |  110[H]  |  20  ----------------------------<  123[H]  3.7   |  18[L]  |  0.59    Ca    7.3[L]      11 Nov 2024 21:34  Phos  3.0     11-11  Mg     2.0     11-11    TPro  6.4  /  Alb  1.9[L]  /  TBili  1.1  /  DBili  x   /  AST  40  /  ALT  16  /  AlkPhos  217[H]  11-11    LIVER FUNCTIONS - ( 11 Nov 2024 21:34 )  Alb: 1.9 g/dL / Pro: 6.4 g/dL / ALK PHOS: 217 U/L / ALT: 16 U/L / AST: 40 U/L / GGT: x           PT/INR - ( 12 Nov 2024 05:24 )   PT: 20.1 sec;   INR: 1.76 ratio         PTT - ( 12 Nov 2024 05:24 )  PTT:34.3 sec        Urinalysis Basic - ( 11 Nov 2024 21:34 )    Color: x / Appearance: x / SG: x / pH: x  Gluc: 123 mg/dL / Ketone: x  / Bili: x / Urobili: x   Blood: x / Protein: x / Nitrite: x   Leuk Esterase: x / RBC: x / WBC x   Sq Epi: x / Non Sq Epi: x / Bacteria: x

## 2024-11-12 NOTE — CONSULT NOTE ADULT - SUBJECTIVE AND OBJECTIVE BOX
Cardiology Consult Note   [Please check [email or web address] password: [email or web address]" for cardiology service schedule and contact information]    HPI:  73 y/o F with PMH of pancreatic cancer on chemotherapy, DVT on Eliquis 2.5mg BID, HTN, recent admission for symptomatic anemia requiring blood transfusion presenting with SOB as well as dark stools. As per patient and grandson at bedside, patient was recently discharged on Eliquis after finding DVT.  Patient was also admitted for symptomatic anemia requiring blood transfusions, however did not receive an EGD and was discharged for outpatient follow-up with Protonix.  Patient developed some shortness of breath and was also having intermittent dark and bloody stools.  Denies any chest pain.      (11 Nov 2024 21:14)      PAST MEDICAL & SURGICAL HISTORY:  Pancreatic cancer      HTN (hypertension)      Anemia        FAMILY HISTORY:    SOCIAL HISTORY:  unchanged    MEDICATIONS:  heparin  Infusion 650 Unit(s)/Hr IV Continuous <Continuous>    piperacillin/tazobactam IVPB.- 3.375 Gram(s) IV Intermittent once  piperacillin/tazobactam IVPB.- 3.375 Gram(s) IV Intermittent once  piperacillin/tazobactam IVPB.. 3.375 Gram(s) IV Intermittent every 8 hours        pantoprazole  Injectable 40 milliGRAM(s) IV Push two times a day      chlorhexidine 2% Cloths 1 Application(s) Topical <User Schedule>        -------------------------------------------------------------------------------------------  PHYSICAL EXAM:  T(C): 36.8 (11-12-24 @ 01:00), Max: 37.2 (11-11-24 @ 13:55)  HR: 91 (11-12-24 @ 02:00) (86 - 130)  BP: 124/58 (11-12-24 @ 02:00) (100/60 - 159/68)  RR: 18 (11-12-24 @ 02:00) (18 - 29)  SpO2: 92% (11-12-24 @ 02:00) (56% - 100%)  Wt(kg): --  I&O's Summary    11 Nov 2024 07:01  -  12 Nov 2024 02:57  --------------------------------------------------------  IN: 19.5 mL / OUT: 800 mL / NET: -780.5 mL        GENERAL: NAD  HEAD: Atraumatic, Normocephalic.  ENT: Moist mucous membranes.  NECK: Supple, No JVD.  CHEST/LUNG: Clear to auscultation bilaterally; No rales, rhonchi, wheezing, or rubs. Unlabored respirations.  HEART: Regular rate and rhythm; No murmurs, rubs, or gallops.  ABDOMEN: Bowel sounds present; Soft, Nontender, Nondistended.   EXTREMITIES:  2+ Peripheral Pulses, brisk capillary refill. No clubbing, cyanosis, or edema.    -------------------------------------------------------------------------------------------  LABS:                          10.9   15.49 )-----------( 142      ( 11 Nov 2024 21:34 )             36.6     11-11    138  |  110[H]  |  20  ----------------------------<  123[H]  3.7   |  18[L]  |  0.59    Ca    7.3[L]      11 Nov 2024 21:34  Phos  3.0     11-11  Mg     2.0     11-11    TPro  6.4  /  Alb  1.9[L]  /  TBili  1.1  /  DBili  x   /  AST  40  /  ALT  16  /  AlkPhos  217[H]  11-11    PT/INR - ( 11 Nov 2024 21:34 )   PT: 18.4 sec;   INR: 1.61 ratio         PTT - ( 11 Nov 2024 21:34 )  PTT:24.8 sec  CARDIAC MARKERS ( 11 Nov 2024 22:53 )  54 ng/L / x     / x     / x     / x     / x      CARDIAC MARKERS ( 11 Nov 2024 21:34 )  57 ng/L / x     / x     / x     / x     / x          piperacillin/tazobactam IVPB.- 3.375 Gram(s) IV Intermittent once  piperacillin/tazobactam IVPB.- 3.375 Gram(s) IV Intermittent once  piperacillin/tazobactam IVPB.. 3.375 Gram(s) IV Intermittent every 8 hours          -------------------------------------------------------------------------------------------  Cardiovascular Diagnostic Testing:    ECG:     Echo:     Stress Testing:    Cath:    -------------------------------------------------------------------------------------------

## 2024-11-12 NOTE — CONSULT NOTE ADULT - SUBJECTIVE AND OBJECTIVE BOX
HPI:  Ms. Mcgrath is a 74 year old female with a PMH of pancreatic cancer s/p Whipple around 3 years ago on chemotherapy, DVT on Eliquis 2.5mg BID, HTN with recent admission for symptomatic anemia requiring blood transfusion presenting with SOB found to have saddle PE. GI consulted for further workup and management of the above.    As per patient and grandson at bedside, patient was recently discharged on Eliquis after finding DVT.  Patient was also admitted for symptomatic anemia requiring blood transfusions, however did not receive an EGD and was discharged for outpatient follow-up with Protonix during an admission at [facility].  Patient developed some shortness of breath and was also having intermittent dark and bloody stools prompting her to come in with CT notable for saddle PE with pt requiruing HFNC and labs notable for elevated trop and p-BNP. Hgb was 6.8 s/p 1 unit with increase to ~10 now around 8/.8 today while on hep gtt.  Pt started on heparin with plan for possible thrombectomy pending clinical course. Pt endorses on and off dark stools although rectal exam with brown stool in the vault. Regarding medications, patient denies any antiplatelet or anticoagulation use outside of Eliquis.Patient denies any family history of GI malignancy. Last EGD and colonoscopy around 5 years ago and normal per pt.           Allergies:  No Known Allergies      Home Medications:  Akynzeo Injection 235 mg-0.25 mg/20 mL intravenous solution: 0.25 milligram(s) intravenously every 28 days day 1, day 8, day 15 (01 Nov 2024 19:03)  Aranesp 200 mcg/0.4 mL injectable solution: 200 microgram(s) subcutaneously every 2 weeks (01 Nov 2024 19:03)  Aranesp 300 mcg/0.6 mL injectable solution: 300 microgram(s) subcutaneously every 3 weeks (01 Nov 2024 19:03)  megestrol 40 mg/mL oral suspension: 5 milliliter(s) orally once a day (01 Nov 2024 19:03)  Neulasta 6 mg/0.6 mL subcutaneous solution: 6 milligram(s) subcutaneously apply on body injector after chemo for one dose (01 Nov 2024 19:04)  PACLitaxel protein-bound 100 mg intravenous injection: 121 milligram(s) intravenously every 28 days day 1, day 8, day 15 (01 Nov 2024 19:04)  pancrelipase 24,000 units-76,000 units-120,000 units oral delayed release capsule: 1 cap(s) orally every 8 hours (01 Nov 2024 19:03)  Retacrit 40,000 units/mL preservative-free injectable solution: 40,000 unit(s) subcutaneously once a week (01 Nov 2024 19:04)  Venofer 20 mg/mL intravenous solution: 100 milligram(s) intravenously (01 Nov 2024 19:03)    Hospital Medications:  chlorhexidine 2% Cloths 1 Application(s) Topical <User Schedule>  furosemide    Tablet 20 milliGRAM(s) Oral once  heparin  Infusion 650 Unit(s)/Hr IV Continuous <Continuous>  pantoprazole  Injectable 40 milliGRAM(s) IV Push two times a day  piperacillin/tazobactam IVPB.- 3.375 Gram(s) IV Intermittent once  piperacillin/tazobactam IVPB.. 3.375 Gram(s) IV Intermittent every 8 hours  vancomycin  IVPB 1250 milliGRAM(s) IV Intermittent once      PMHX/PSHX:    Pancreatic cancer s/p Whipple Procedure   HTN (hypertension)  Anemia  DVT/PE       istory of colon cancer/polyps, stomach cancer/polyps, pancreatic cancer/masses, liver cancer/disease, ovarian cancer and endometrial cancer.    Social History:   Tob: Denies  EtOH: Denies  Illicit Drugs: Denies    ROS: Complete and normal except as mentioned above    PHYSICAL EXAM:   GENERAL:  No acute distress  HEENT:  NCAT, no scleral icterus   CHEST:  coarse breath sounds b/l   HEART:  Regular rate and rhythm  ABDOMEN:  Soft, non-tender, non-distended, normoactive bowel sounds. Rectal exam with brown stool in the vault.   EXTREMITIES: 1-2+ edema b/l L>R  NEURO:  Alert and oriented x 3    Vital Signs:  Vital Signs Last 24 Hrs  T(C): 37.2 (12 Nov 2024 09:00), Max: 37.2 (11 Nov 2024 13:55)  T(F): 99 (12 Nov 2024 09:00), Max: 99 (11 Nov 2024 13:55)  HR: 91 (12 Nov 2024 10:00) (84 - 130)  BP: 151/70 (12 Nov 2024 10:00) (100/60 - 159/68)  BP(mean): 100 (12 Nov 2024 10:00) (75 - 105)  RR: 25 (12 Nov 2024 10:00) (18 - 29)  SpO2: 93% (12 Nov 2024 10:00) (56% - 100%)    Parameters below as of 12 Nov 2024 10:00  Patient On (Oxygen Delivery Method): nasal cannula, high flow  O2 Flow (L/min): 50  O2 Concentration (%): 100  Daily Height in cm: 152.4 (11 Nov 2024 21:00)    Daily     LABS:                        8.8    21.85 )-----------( 211      ( 12 Nov 2024 05:24 )             28.7     Mean Cell Volume: 83.2 fl (11-12-24 @ 05:24)    11-11    138  |  110[H]  |  20  ----------------------------<  123[H]  3.7   |  18[L]  |  0.59    Ca    7.3[L]      11 Nov 2024 21:34  Phos  3.0     11-11  Mg     2.0     11-11    TPro  6.4  /  Alb  1.9[L]  /  TBili  1.1  /  DBili  x   /  AST  40  /  ALT  16  /  AlkPhos  217[H]  11-11    LIVER FUNCTIONS - ( 11 Nov 2024 21:34 )  Alb: 1.9 g/dL / Pro: 6.4 g/dL / ALK PHOS: 217 U/L / ALT: 16 U/L / AST: 40 U/L / GGT: x           PT/INR - ( 12 Nov 2024 05:24 )   PT: 20.1 sec;   INR: 1.76 ratio         PTT - ( 12 Nov 2024 05:24 )  PTT:34.3 sec  Urinalysis Basic - ( 11 Nov 2024 21:34 )    Color: x / Appearance: x / SG: x / pH: x  Gluc: 123 mg/dL / Ketone: x  / Bili: x / Urobili: x   Blood: x / Protein: x / Nitrite: x   Leuk Esterase: x / RBC: x / WBC x   Sq Epi: x / Non Sq Epi: x / Bacteria: x                              8.8    21.85 )-----------( 211      ( 12 Nov 2024 05:24 )             28.7                         10.9   15.49 )-----------( 142      ( 11 Nov 2024 21:34 )             36.6                         6.8    20.64 )-----------( 174      ( 11 Nov 2024 16:00 )             22.6                         7.9    26.29 )-----------( 248      ( 11 Nov 2024 13:50 )             26.0       Imaging:  < from: CT Abdomen and Pelvis w/ IV Cont (11.11.24 @ 16:37) >  IMPRESSION:    Saddle pulmonary embolus. No evidence of right heart strain.    Diffuse symmetric groundglass and consolidation which is nonspecific.   Differential includes edema, hemorrhage, and infection.    Multilobulated cystic liver mass. Differential diagnosis includes   metastasis or abscess.    Diffuse colonic and small bowel wall thickening compatible with   enterocolitis.    1 cm nodular filling defect in the portal vein, suggestive of thrombus,   either tumor thrombus or bland thrombus.    Ascites.    < end of copied text >             HPI:  Ms. Mcgrath is a 74 year old female with a PMH of pancreatic cancer s/p Whipple around 3 years ago on chemotherapy, DVT on Eliquis 2.5mg BID, HTN with recent admission for symptomatic anemia requiring blood transfusion presenting with SOB found to have saddle PE. GI consulted for further workup and management of the above.    As per patient and grandson at bedside, patient was recently discharged on Eliquis after finding DVT.  Patient was also admitted for symptomatic anemia requiring blood transfusions, however did not receive an EGD and was discharged for outpatient follow-up with Protonix during an admission at [facility].  Patient developed some shortness of breath and was also having intermittent dark and bloody stools prompting her to come in with CT notable for saddle PE with pt requiruing HFNC and labs notable for elevated trop and p-BNP. Hgb was 6.8 s/p 1 unit with increase to ~10 now around 8/.8 today while on hep gtt.  Pt started on heparin with plan for possible thrombectomy pending clinical course. Pt endorses on and off dark stools although rectal exam with brown stool in the vault. Regarding medications, patient denies any antiplatelet or anticoagulation use outside of Eliquis.Patient denies any family history of GI malignancy. Last EGD and colonoscopy around 5 years ago and normal per pt. She denies seeing any melena or hematochezia recently in her stool.          Allergies:  No Known Allergies      Home Medications:  Akynzeo Injection 235 mg-0.25 mg/20 mL intravenous solution: 0.25 milligram(s) intravenously every 28 days day 1, day 8, day 15 (01 Nov 2024 19:03)  Aranesp 200 mcg/0.4 mL injectable solution: 200 microgram(s) subcutaneously every 2 weeks (01 Nov 2024 19:03)  Aranesp 300 mcg/0.6 mL injectable solution: 300 microgram(s) subcutaneously every 3 weeks (01 Nov 2024 19:03)  megestrol 40 mg/mL oral suspension: 5 milliliter(s) orally once a day (01 Nov 2024 19:03)  Neulasta 6 mg/0.6 mL subcutaneous solution: 6 milligram(s) subcutaneously apply on body injector after chemo for one dose (01 Nov 2024 19:04)  PACLitaxel protein-bound 100 mg intravenous injection: 121 milligram(s) intravenously every 28 days day 1, day 8, day 15 (01 Nov 2024 19:04)  pancrelipase 24,000 units-76,000 units-120,000 units oral delayed release capsule: 1 cap(s) orally every 8 hours (01 Nov 2024 19:03)  Retacrit 40,000 units/mL preservative-free injectable solution: 40,000 unit(s) subcutaneously once a week (01 Nov 2024 19:04)  Venofer 20 mg/mL intravenous solution: 100 milligram(s) intravenously (01 Nov 2024 19:03)    Hospital Medications:  chlorhexidine 2% Cloths 1 Application(s) Topical <User Schedule>  furosemide    Tablet 20 milliGRAM(s) Oral once  heparin  Infusion 650 Unit(s)/Hr IV Continuous <Continuous>  pantoprazole  Injectable 40 milliGRAM(s) IV Push two times a day  piperacillin/tazobactam IVPB.- 3.375 Gram(s) IV Intermittent once  piperacillin/tazobactam IVPB.. 3.375 Gram(s) IV Intermittent every 8 hours  vancomycin  IVPB 1250 milliGRAM(s) IV Intermittent once      PMHX/PSHX:    Pancreatic cancer s/p Whipple Procedure   HTN (hypertension)  Anemia  DVT/PE       istory of colon cancer/polyps, stomach cancer/polyps, pancreatic cancer/masses, liver cancer/disease, ovarian cancer and endometrial cancer.    Social History:   Tob: Denies  EtOH: Denies  Illicit Drugs: Denies    ROS: Complete and normal except as mentioned above    PHYSICAL EXAM:   GENERAL:  No acute distress  HEENT:  NCAT, no scleral icterus   CHEST:  coarse breath sounds b/l ; on high flow oxygen  HEART:  Regular rate and rhythm  ABDOMEN:  Soft, non-tender, non-distended, normoactive bowel sounds. Rectal exam with brown stool in the vault.   EXTREMITIES: 1-2+ edema b/l L>R  NEURO:  Alert and oriented x 3    Vital Signs:  Vital Signs Last 24 Hrs  T(C): 37.2 (12 Nov 2024 09:00), Max: 37.2 (11 Nov 2024 13:55)  T(F): 99 (12 Nov 2024 09:00), Max: 99 (11 Nov 2024 13:55)  HR: 91 (12 Nov 2024 10:00) (84 - 130)  BP: 151/70 (12 Nov 2024 10:00) (100/60 - 159/68)  BP(mean): 100 (12 Nov 2024 10:00) (75 - 105)  RR: 25 (12 Nov 2024 10:00) (18 - 29)  SpO2: 93% (12 Nov 2024 10:00) (56% - 100%)    Parameters below as of 12 Nov 2024 10:00  Patient On (Oxygen Delivery Method): nasal cannula, high flow  O2 Flow (L/min): 50  O2 Concentration (%): 100  Daily Height in cm: 152.4 (11 Nov 2024 21:00)    Daily     LABS:                        8.8    21.85 )-----------( 211      ( 12 Nov 2024 05:24 )             28.7     Mean Cell Volume: 83.2 fl (11-12-24 @ 05:24)    11-11    138  |  110[H]  |  20  ----------------------------<  123[H]  3.7   |  18[L]  |  0.59    Ca    7.3[L]      11 Nov 2024 21:34  Phos  3.0     11-11  Mg     2.0     11-11    TPro  6.4  /  Alb  1.9[L]  /  TBili  1.1  /  DBili  x   /  AST  40  /  ALT  16  /  AlkPhos  217[H]  11-11    LIVER FUNCTIONS - ( 11 Nov 2024 21:34 )  Alb: 1.9 g/dL / Pro: 6.4 g/dL / ALK PHOS: 217 U/L / ALT: 16 U/L / AST: 40 U/L / GGT: x           PT/INR - ( 12 Nov 2024 05:24 )   PT: 20.1 sec;   INR: 1.76 ratio         PTT - ( 12 Nov 2024 05:24 )  PTT:34.3 sec  Urinalysis Basic - ( 11 Nov 2024 21:34 )    Color: x / Appearance: x / SG: x / pH: x  Gluc: 123 mg/dL / Ketone: x  / Bili: x / Urobili: x   Blood: x / Protein: x / Nitrite: x   Leuk Esterase: x / RBC: x / WBC x   Sq Epi: x / Non Sq Epi: x / Bacteria: x                              8.8    21.85 )-----------( 211      ( 12 Nov 2024 05:24 )             28.7                         10.9   15.49 )-----------( 142      ( 11 Nov 2024 21:34 )             36.6                         6.8    20.64 )-----------( 174      ( 11 Nov 2024 16:00 )             22.6                         7.9    26.29 )-----------( 248      ( 11 Nov 2024 13:50 )             26.0       Imaging:  < from: CT Abdomen and Pelvis w/ IV Cont (11.11.24 @ 16:37) >  IMPRESSION:    Saddle pulmonary embolus. No evidence of right heart strain.    Diffuse symmetric groundglass and consolidation which is nonspecific.   Differential includes edema, hemorrhage, and infection.    Multilobulated cystic liver mass. Differential diagnosis includes   metastasis or abscess.    Diffuse colonic and small bowel wall thickening compatible with   enterocolitis.    1 cm nodular filling defect in the portal vein, suggestive of thrombus,   either tumor thrombus or bland thrombus.    Ascites.    < end of copied text >

## 2024-11-12 NOTE — CONSULT NOTE ADULT - SUBJECTIVE AND OBJECTIVE BOX
Reason for consult: Panc ca    HPI: 75 y/o F with PMH of pancreatic cancer on chemotherapy, DVT on Eliquis 2.5mg BID, HTN, recent admission for symptomatic anemia requiring blood transfusion presenting with SOB as well as dark stools. As per patient and grandson at bedside, patient was recently discharged on Eliquis after finding DVT.  Patient was also admitted for symptomatic anemia requiring blood transfusions, however did not receive an EGD and was discharged for outpatient follow-up with Protonix.  Patient developed some shortness of breath and was also having intermittent dark and bloody stools.  Denies any chest pain. Heme/onc consulted on this patient of Dr. Posey at [facility]. She is on treatment for Adenocarcinoma of the pancreatic head with Gemzar/Abraxane, LD 10/18 and Aranesp 200 mcg LD 11/1.     PAST MEDICAL & SURGICAL HISTORY:  Pancreatic cancer      HTN (hypertension)      Anemia          FAMILY HISTORY:      Alochol: Denied  Smoking: Nonsmoker  Drug Use: Denied  Marital Status:         Allergies    No Known Allergies    Intolerances        MEDICATIONS  (STANDING):  chlorhexidine 2% Cloths 1 Application(s) Topical <User Schedule>  heparin  Infusion 650 Unit(s)/Hr (8 mL/Hr) IV Continuous <Continuous>  pantoprazole  Injectable 40 milliGRAM(s) IV Push two times a day  piperacillin/tazobactam IVPB.. 3.375 Gram(s) IV Intermittent every 8 hours    MEDICATIONS  (PRN):      ROS  No fever, sweats, chills  No epistaxis, HA, sore throat  No CP, SOB, cough, sputum  No n/v/d, abd pain, melena, hematochezia  No edema  No rash  No anxiety  No back pain, joint pain  No bleeding, bruising  No dysuria, hematuria    T(C): 37.2 (11-12-24 @ 09:00), Max: 37.2 (11-11-24 @ 13:55)  HR: 91 (11-12-24 @ 10:00) (84 - 130)  BP: 151/70 (11-12-24 @ 10:00) (100/60 - 159/68)  RR: 25 (11-12-24 @ 10:00) (18 - 29)  SpO2: 93% (11-12-24 @ 10:00) (56% - 100%)  Wt(kg): --    PE  NAD  Awake, alert  Anicteric, MMM  Abd soft, NT, ND  No c/c/e  No rash grossly                        8.8    21.85 )-----------( 211      ( 12 Nov 2024 05:24 )             28.7       11-11    138  |  110[H]  |  20  ----------------------------<  123[H]  3.7   |  18[L]  |  0.59    Ca    7.3[L]      11 Nov 2024 21:34  Phos  3.0     11-11  Mg     2.0     11-11    TPro  6.4  /  Alb  1.9[L]  /  TBili  1.1  /  DBili  x   /  AST  40  /  ALT  16  /  AlkPhos  217[H]  11-11

## 2024-11-12 NOTE — CHART NOTE - NSCHARTNOTEFT_GEN_A_CORE
Spoke with patient at bedside with [facility]  #[id number] to confirm full code status. Patient says she would want to be intubated if deemed medically necessary to protect airway.

## 2024-11-12 NOTE — PROGRESS NOTE ADULT - ASSESSMENT
====================== NEUROLOGY=====================  No active issues; A&Ox4  - neuro checks per CICU protocol    ==================== RESPIRATORY======================  #AHRF  - on high flow 50/100 91%-92%  -wean as tolerated  - trend ABGs   - s/p lasix 40 IVP     ====================CARDIOVASCULAR==================  # saddle PE   11/11 TTE:  EF 55-60%.  R. ventricular cavity and systolic function normal. interventricular septum flattened in systole and diastolic c/w R. ventricular pressure and volume overload.  - lactate elev to 7.2, now cleared 1.3  - uptrending proBNP 3000+   - trial heparin gtt for AC   -Pending vascular recs    ===================HEMATOLOGIC/ONC ===================  #Anemia  - p/w dark tarry stools  - transfuse for hgb< 7   - s/p 1u pRBC on 11/11   -11/12: Hgb continue to downtrend 10.9-->8.8    #pancreatic CA  - on chemo     DVT ppx: heparin gtt     ===================== RENAL =========================  No active issues  - monitor SCr, UOP  - monitor electrolytes and replete for goal K 4-4.5 and Mg >2     ==================== GASTROINTESTINAL===================  #NPO   - Pantoprazole 40mg IV push BID    =======================    ENDOCRINE  =====================  No active issues  - monitor blood glucose  - pending a1c, lipid profile     ========================INFECTIOUS DISEASE================  -11/11: CT abd/pelvis: Diffuse colonic and small bowel thickening indicative of colitis. Multilobulated cystic liver mass indicative of metastasis vs. abscess    #leukocytosis   - pending Bcx x2   -Pending MRSA swab  - zosyn 11/11-   -Vanc 11/12-    Dispo: Maintain in ICU.  FULL CODE   ====================== NEUROLOGY=====================  No active issues; A&Ox4  - neuro checks per CICU protocol    ==================== RESPIRATORY======================  #AHRF  - on high flow 50/100 91%-92%  -wean as tolerated  - trend ABGs     ====================CARDIOVASCULAR==================  # saddle PE   11/11 TTE:  EF 55-60%.  R. ventricular cavity and systolic function normal. interventricular septum flattened in systole and diastolic c/w R. ventricular pressure and volume overload.  - lactate elev to 7.2, now cleared 1.3  - uptrending proBNP 3000+   - trial heparin gtt for AC   -Pending vascular recs    ===================HEMATOLOGIC/ONC ===================  #Anemia  - p/w dark tarry stools  - transfuse for hgb< 7   - s/p 1u pRBC on 11/11   -11/12: Hgb continue to downtrend 10.9-->8.8    #pancreatic CA  - on chemo     DVT ppx: heparin gtt     ===================== RENAL =========================  No active issues  - monitor SCr, UOP  - monitor electrolytes and replete for goal K 4-4.5 and Mg >2     ==================== GASTROINTESTINAL===================  #NPO   - Pantoprazole 40mg IV push BID    =======================    ENDOCRINE  =====================  No active issues  - monitor blood glucose  - pending a1c, lipid profile     ========================INFECTIOUS DISEASE================  -11/11: CT abd/pelvis: Diffuse colonic and small bowel thickening indicative of colitis. Multilobulated cystic liver mass indicative of metastasis vs. abscess    #leukocytosis   - pending Bcx x2   -Pending MRSA swab  - zosyn 11/11-   -Vanc 11/12-    Dispo: Maintain in ICU.  FULL CODE

## 2024-11-12 NOTE — CONSULT NOTE ADULT - ASSESSMENT
73 y/o F with PMH of pancreatic cancer on chemotherapy, DVT on Eliquis 2.5mg BID, HTN, recent admission for symptomatic anemia requiring blood transfusion presenting with SOB as well as dark stools found to have saddle PE.    Extreme O2 requirement with concern for intraventricular septum flattening on TTE. Elevated proBNP but negative trop. Recent positive duplex for DVT. Discussed with PERT attending, possible mechanical thrombectomy in AM.    Recommendations:  - continue Hep ggt  - full TTE in AM 75 y/o F with PMH of pancreatic cancer on chemotherapy, DVT on Eliquis 2.5mg BID, HTN, recent admission for symptomatic anemia requiring blood transfusion presenting with SOB as well as dark stools found to have saddle PE.    Extreme O2 requirement with concern for intraventricular septum flattening on TTE. Elevated proBNP but negative trop. Recent positive duplex for DVT. Discussed with PERT attending, possible mechanical thrombectomy in AM.    Recommendations:  - continue Hep ggt  - full TTE in AM  - pt in CCU

## 2024-11-12 NOTE — CONSULT NOTE ADULT - ASSESSMENT
Ms. Mcgrath is a 74 year old female with a PMH of pancreatic cancer s/p Whipple around 3 years ago on chemotherapy, DVT on Eliquis 2.5mg BID, HTN with recent admission for symptomatic anemia requiring blood transfusion presenting with SOB found to have saddle PE. GI consulted for further workup and management of the above.    #Submassive ME with Saddle Embolus   #Acute on Chronic Anemia   Pt with recent DVT started on Eliquis presenting with SOB and ? on and off dark stools with CT notable for saddle PE with pt requiruing HFNC and labs notable for elevated trop and p-BNP. Hgb was 6.8 s/p 1 unit with increase to ~10 now around 8.8 today while on hep gtt. Rectal exam currently with brown stool in the vault with no signs of melena.   Last EGD and colonoscopy around 5 years ago and normal per pt.  In the absence of any overt bleeding no absolute CI to continuing AC if clinically indicated. Once more medically optimzed pt should have repeat EGD and colonoscopy to further workup anemia.        Ms. Mcgrath is a 74 year old female with a PMH of pancreatic cancer s/p Whipple around 3 years ago on chemotherapy, DVT on Eliquis 2.5mg BID, HTN with recent admission for symptomatic anemia requiring blood transfusion presenting with SOB found to have saddle PE. GI consulted for further workup and management of the above.    #Submassive ME with Saddle Embolus   #Acute on Chronic Anemia   Pt with recent DVT started on Eliquis presenting with SOB and ? on and off dark stools with CT notable for saddle PE with pt requiruing HFNC and labs notable for elevated trop and p-BNP. Hgb was 6.8 s/p 1 unit with increase to ~10 now around 8.8 today while on hep gtt. Rectal exam currently with brown stool in the vault with no signs of melena.   Last EGD and colonoscopy around 5 years ago and normal per pt.  In the absence of any overt bleeding no absolute CI to continuing AC if clinically indicated. Once more medically optimzed pt should have repeat EGD and colonoscopy to further workup anemia.     Recommendations:  -No absolute contraindications to AC if clinically indicated in the absence of any overt bleeding  -Outpatient EGD and colonoscopy once more medically stable and optimized   -Please notify team if pt develops any overt signs of bleeding   -PO PPI QD for gastroprotection while on AC    GI will plan to sign off at this time. Please feel free to reach out to our team with any follow up questions.  All recommendations are tentative until note is attested by attending.    Ms. Mcgrath is a 74 year old female with a PMH of pancreatic cancer s/p Whipple around 3 years ago on chemotherapy, DVT on Eliquis 2.5mg BID, HTN with recent admission for symptomatic anemia requiring blood transfusion presenting with SOB found to have saddle PE. GI consulted for further workup and management of the above.    #Submassive ME with Saddle Embolus   #Acute on Chronic Anemia   Pt with recent DVT started on Eliquis presenting with SOB and ? on and off dark stools with CT notable for saddle PE with pt requiruing HFNC and labs notable for elevated trop and p-BNP. Hgb was 6.8 s/p 1 unit with increase to ~10 now around 8.8 today while on hep gtt. Rectal exam currently with brown stool in the vault with no signs of melena.   Last EGD and colonoscopy around 5 years ago and normal per pt.  In the absence of any overt bleeding no absolute CI to continuing AC if clinically indicated. Once more medically optimzed pt should have repeat EGD and colonoscopy to further workup anemia.     #Liver Collection   CTAP notable for Multilobulated cystic liver mass. Differential diagnosis includes metastasis or abscess. Please obtain MRI and consider hepatology consult pending results     Recommendations:  -No absolute contraindications to AC if clinically indicated in the absence of any overt bleeding  -Outpatient EGD and colonoscopy once more medically stable and optimized   -Please notify team if pt develops any overt signs of bleeding   -PO PPI QD for gastroprotection while on AC  -MRI of the liver when more mediaclly stable. Pending results consider hepatology evalaution.     GI will plan to sign off at this time. Please feel free to reach out to our team with any follow up questions.  All recommendations are tentative until note is attested by attending.

## 2024-11-12 NOTE — PROGRESS NOTE ADULT - SUBJECTIVE AND OBJECTIVE BOX
KELLI SHELTON  MRN-[id number]  Patient is a 74y old  Female who presents with a chief complaint of saddle PE (12 Nov 2024 12:56)    HPI:  75 y/o F with PMH of pancreatic cancer on chemotherapy, DVT on Eliquis 2.5mg BID, HTN, recent admission for symptomatic anemia requiring blood transfusion presenting with SOB as well as dark stools. As per patient and grandson at bedside, patient was recently discharged on Eliquis after finding DVT.  Patient was also admitted for symptomatic anemia requiring blood transfusions, however did not receive an EGD and was discharged for outpatient follow-up with Protonix.  Patient developed some shortness of breath and was also having intermittent dark and bloody stools.  Denies any chest pain.      (11 Nov 2024 21:14)      Hospital Course:    24 HOUR EVENTS:    REVIEW OF SYSTEMS:    CONSTITUTIONAL: No weakness, fevers or chills  EYES/ENT: No visual changes;  No vertigo or throat pain   NECK: No pain or stiffness  RESPIRATORY: No cough, wheezing, hemoptysis; No shortness of breath  CARDIOVASCULAR: No chest pain or palpitations  GASTROINTESTINAL: No abdominal or epigastric pain. No nausea, vomiting, or hematemesis; No diarrhea or constipation. No melena or hematochezia.  GENITOURINARY: No dysuria, frequency or hematuria  NEUROLOGICAL: No numbness or weakness  SKIN: No itching, rashes      ICU Vital Signs Last 24 Hrs  T(C): 36.8 (12 Nov 2024 20:00), Max: 37.4 (12 Nov 2024 12:00)  T(F): 98.3 (12 Nov 2024 20:00), Max: 99.4 (12 Nov 2024 12:00)  HR: 86 (12 Nov 2024 21:00) (80 - 97)  BP: 130/63 (12 Nov 2024 21:00) (106/55 - 159/68)  BP(mean): 91 (12 Nov 2024 21:00) (75 - 102)  ABP: --  ABP(mean): --  RR: 18 (12 Nov 2024 21:00) (17 - 27)  SpO2: 94% (12 Nov 2024 21:00) (90% - 98%)    O2 Parameters below as of 12 Nov 2024 22:00  Patient On (Oxygen Delivery Method): nasal cannula, high flow  O2 Flow (L/min): 50  O2 Concentration (%): 100        CVP(mm Hg): --  CO: --  CI: --  PA: --  PA(mean): --  PA(direct): --  PCWP: --  LA: --  RA: --  SVR: --  SVRI: --  PVR: --  PVRI: --  I&O's Summary    11 Nov 2024 07:01  -  12 Nov 2024 07:00  --------------------------------------------------------  IN: 178.5 mL / OUT: 1800 mL / NET: -1621.5 mL    12 Nov 2024 07:01  -  12 Nov 2024 21:56  --------------------------------------------------------  IN: 531 mL / OUT: 1200 mL / NET: -669 mL        CAPILLARY BLOOD GLUCOSE    CAPILLARY BLOOD GLUCOSE          PHYSICAL EXAM:  General: frail   HEENT: PERRLA, EOMI  Neurology: A&Ox3, nonfocal, BARRIOS x 4  Respiratory: mild crackles to right lung base  CV: RRR  Abdominal: Soft, NT, distended  Extremities: 2 + pitting edema, + peripheral pulses    ============================I/O===========================   I&O's Detail    11 Nov 2024 07:01  -  12 Nov 2024 07:00  --------------------------------------------------------  IN:    Heparin: 53.5 mL    IV PiggyBack: 125 mL  Total IN: 178.5 mL    OUT:    Oral Fluid: 0 mL    Voided (mL): 1800 mL  Total OUT: 1800 mL    Total NET: -1621.5 mL      12 Nov 2024 07:01  -  12 Nov 2024 21:56  --------------------------------------------------------  IN:    Heparin: 106 mL    IV PiggyBack: 375 mL    Oral Fluid: 50 mL  Total IN: 531 mL    OUT:    Voided (mL): 1200 mL  Total OUT: 1200 mL    Total NET: -669 mL        ============================ LABS =========================                        8.8    22.71 )-----------( 216      ( 12 Nov 2024 20:50 )             28.3     11-12    144  |  109[H]  |  18  ----------------------------<  148[H]  3.1[L]   |  24  |  0.74    Ca    8.1[L]      12 Nov 2024 20:50  Phos  3.9     11-12  Mg     1.9     11-12    TPro  6.6  /  Alb  2.0[L]  /  TBili  0.7  /  DBili  x   /  AST  33  /  ALT  17  /  AlkPhos  215[H]  11-12    Troponin T, High Sensitivity Result: 54 ng/L (11-11-24 @ 22:53)  Troponin T, High Sensitivity Result: 57 ng/L (11-11-24 @ 21:34)              LIVER FUNCTIONS - ( 12 Nov 2024 20:50 )  Alb: 2.0 g/dL / Pro: 6.6 g/dL / ALK PHOS: 215 U/L / ALT: 17 U/L / AST: 33 U/L / GGT: x           PT/INR - ( 12 Nov 2024 20:50 )   PT: 18.9 sec;   INR: 1.67 ratio         PTT - ( 12 Nov 2024 20:50 )  PTT:49.0 sec  ABG - ( 11 Nov 2024 21:15 )  pH, Arterial: 7.39  pH, Blood: x     /  pCO2: 36    /  pO2: 104   / HCO3: 22    / Base Excess: -2.8  /  SaO2: 97.4              Blood Gas Arterial, Lactate: 1.3 mmol/L (11-11-24 @ 21:15)  Lactate, Blood: 2.9 mmol/L (11-11-24 @ 16:00)  Lactate, Blood: 7.2 mmol/L (11-11-24 @ 13:50)  Blood Gas Venous - Lactate: 7.60 mmol/L (11-11-24 @ 13:45)    Urinalysis Basic - ( 12 Nov 2024 20:50 )    Color: x / Appearance: x / SG: x / pH: x  Gluc: 148 mg/dL / Ketone: x  / Bili: x / Urobili: x   Blood: x / Protein: x / Nitrite: x   Leuk Esterase: x / RBC: x / WBC x   Sq Epi: x / Non Sq Epi: x / Bacteria: x      ======================Micro/Rad/Cardio=================  Telemtry: Reviewed   EKG: Reviewed  CXR: Reviewed  Culture: Reviewed   ======================================================  PAST MEDICAL & SURGICAL HISTORY:  Pancreatic cancer      HTN (hypertension)      Anemia        ====================ASSESSMENT ==============  75 y/o F with PMH of pancreatic cancer on chemotherapy, DVT on Eliquis 2.5mg BID, HTN, recent admission for symptomatic anemia requiring blood transfusion presenting with SOB as well as dark stools. F/w saddle PE on CTA chest.     Plan:  ====================== NEUROLOGY=====================  No active issues; A&Ox4  - neuro checks per CICU protocol    ==================== RESPIRATORY======================  #AHRF  - on high flow 50/100 91%-92%  -wean as tolerated  - trend ABGs     ====================CARDIOVASCULAR==================  # saddle PE   11/11 TTE:  EF 55-60%.  R. ventricular cavity and systolic function normal. interventricular septum flattened in systole and diastolic c/w R. ventricular pressure and volume overload.  - lactate elev to 7.2, now cleared 1.3  - uptrending proBNP 3000+   - trial heparin gtt for AC   -Pending vascular recs    ===================HEMATOLOGIC/ONC ===================  #Anemia  - p/w dark tarry stools  - transfuse for hgb< 7   - s/p 1u pRBC on 11/11   -11/12: Hgb continue to downtrend 10.9-->8.8    #pancreatic CA  - on chemo     DVT ppx: heparin gtt     heparin  Infusion 650 Unit(s)/Hr (10 mL/Hr) IV Continuous <Continuous>    ===================== RENAL =========================  No active issues  - monitor SCr, UOP  - monitor electrolytes and replete for goal K 4-4.5 and Mg >2     ==================== GASTROINTESTINAL===================  #NPO   - Pantoprazole 40mg IV push BID    magnesium sulfate  IVPB 1 Gram(s) IV Intermittent once  pantoprazole  Injectable 40 milliGRAM(s) IV Push two times a day  potassium chloride   Powder 40 milliEquivalent(s) Oral every 2 hours    =======================    ENDOCRINE  =====================  No active issues  - monitor blood glucose  - pending a1c, lipid profile     ========================INFECTIOUS DISEASE================  -11/11: CT abd/pelvis: Diffuse colonic and small bowel thickening indicative of colitis. Multilobulated cystic liver mass indicative of metastasis vs. abscess    #leukocytosis   - pending Bcx x2   -Pending MRSA swab  - zosyn 11/11-   -Vanc 11/12-    Dispo: Maintain in ICU.  FULL CODE    piperacillin/tazobactam IVPB.. 3.375 Gram(s) IV Intermittent every 8 hours    Patient requires continuous monitoring with bedside rhythm monitoring, pulse ox monitoring, and intermittent blood gas analysis. Care plan discussed with ICU care team. Patient remained critical and at risk for life threatening decompensation.  Patient seen, examined and plan discussed with CCU team during rounds.     I have personally provided ____ minutes of critical care time excluding time spent on separate procedures, in addition to initial critical care time provided by the CICU Attending, Dr. Sam.     By signing my name below, I, Reji Stewart, attest that this documentation has been prepared under the direction and in the presence of Rosario Lopez NP  Electronically signed: Bonifacio Kaufman, 11-12-24 @ 21:56    I, Rosario Lopez NP, personally performed the services described in this documentation. all medical record entries made by the bonifacio were at my direction and in my presence. I have reviewed the chart and agree that the record reflects my personal performance and is accurate and complete  Electronically signed: Rosario Lopez NP       KELLI SHELTON  MRN-[id number]  Patient is a 74y old  Female who presents with a chief complaint of saddle PE (12 Nov 2024 12:56)    HPI:  73 y/o F with PMH of pancreatic cancer on chemotherapy, DVT on Eliquis 2.5mg BID, HTN, recent admission for symptomatic anemia requiring blood transfusion presenting with SOB as well as dark stools. As per patient and grandson at bedside, patient was recently discharged on Eliquis after finding DVT.  Patient was also admitted for symptomatic anemia requiring blood transfusions, however did not receive an EGD and was discharged for outpatient follow-up with Protonix.  Patient developed some shortness of breath and was also having intermittent dark and bloody stools.  Denies any chest pain.      (11 Nov 2024 21:14)      Hospital Course:    24 HOUR EVENTS:    REVIEW OF SYSTEMS:    CONSTITUTIONAL: No weakness, fevers or chills  EYES/ENT: No visual changes;  No vertigo or throat pain   NECK: No pain or stiffness  RESPIRATORY: No cough, wheezing, hemoptysis; No shortness of breath  CARDIOVASCULAR: No chest pain or palpitations  GASTROINTESTINAL: No abdominal or epigastric pain. No nausea, vomiting, or hematemesis; No diarrhea or constipation. No melena or hematochezia.  GENITOURINARY: No dysuria, frequency or hematuria  NEUROLOGICAL: No numbness or weakness  SKIN: No itching, rashes      ICU Vital Signs Last 24 Hrs  T(C): 36.8 (12 Nov 2024 20:00), Max: 37.4 (12 Nov 2024 12:00)  T(F): 98.3 (12 Nov 2024 20:00), Max: 99.4 (12 Nov 2024 12:00)  HR: 86 (12 Nov 2024 21:00) (80 - 97)  BP: 130/63 (12 Nov 2024 21:00) (106/55 - 159/68)  BP(mean): 91 (12 Nov 2024 21:00) (75 - 102)  ABP: --  ABP(mean): --  RR: 18 (12 Nov 2024 21:00) (17 - 27)  SpO2: 94% (12 Nov 2024 21:00) (90% - 98%)    O2 Parameters below as of 12 Nov 2024 22:00  Patient On (Oxygen Delivery Method): nasal cannula, high flow  O2 Flow (L/min): 50  O2 Concentration (%): 100        I&O's Summary    11 Nov 2024 07:01  -  12 Nov 2024 07:00  --------------------------------------------------------  IN: 178.5 mL / OUT: 1800 mL / NET: -1621.5 mL    12 Nov 2024 07:01  -  12 Nov 2024 21:56  --------------------------------------------------------  IN: 531 mL / OUT: 1200 mL / NET: -669 mL        CAPILLARY BLOOD GLUCOSE    CAPILLARY BLOOD GLUCOSE          PHYSICAL EXAM:  General: frail   HEENT: PAOLA KAISER  Neurology: A&Ox3, nonfocal, BARRIOS x 4  Respiratory: mild crackles to right lung base  CV: RRR  Abdominal: Soft, NT, distended  Extremities: 2 + pitting edema, + peripheral pulses    ============================I/O===========================   I&O's Detail    11 Nov 2024 07:01  -  12 Nov 2024 07:00  --------------------------------------------------------  IN:    Heparin: 53.5 mL    IV PiggyBack: 125 mL  Total IN: 178.5 mL    OUT:    Oral Fluid: 0 mL    Voided (mL): 1800 mL  Total OUT: 1800 mL    Total NET: -1621.5 mL      12 Nov 2024 07:01  -  12 Nov 2024 21:56  --------------------------------------------------------  IN:    Heparin: 106 mL    IV PiggyBack: 375 mL    Oral Fluid: 50 mL  Total IN: 531 mL    OUT:    Voided (mL): 1200 mL  Total OUT: 1200 mL    Total NET: -669 mL        ============================ LABS =========================                        8.8    22.71 )-----------( 216      ( 12 Nov 2024 20:50 )             28.3     11-12    144  |  109[H]  |  18  ----------------------------<  148[H]  3.1[L]   |  24  |  0.74    Ca    8.1[L]      12 Nov 2024 20:50  Phos  3.9     11-12  Mg     1.9     11-12    TPro  6.6  /  Alb  2.0[L]  /  TBili  0.7  /  DBili  x   /  AST  33  /  ALT  17  /  AlkPhos  215[H]  11-12    Troponin T, High Sensitivity Result: 54 ng/L (11-11-24 @ 22:53)  Troponin T, High Sensitivity Result: 57 ng/L (11-11-24 @ 21:34)              LIVER FUNCTIONS - ( 12 Nov 2024 20:50 )  Alb: 2.0 g/dL / Pro: 6.6 g/dL / ALK PHOS: 215 U/L / ALT: 17 U/L / AST: 33 U/L / GGT: x           PT/INR - ( 12 Nov 2024 20:50 )   PT: 18.9 sec;   INR: 1.67 ratio         PTT - ( 12 Nov 2024 20:50 )  PTT:49.0 sec  ABG - ( 11 Nov 2024 21:15 )  pH, Arterial: 7.39  pH, Blood: x     /  pCO2: 36    /  pO2: 104   / HCO3: 22    / Base Excess: -2.8  /  SaO2: 97.4              Blood Gas Arterial, Lactate: 1.3 mmol/L (11-11-24 @ 21:15)  Lactate, Blood: 2.9 mmol/L (11-11-24 @ 16:00)  Lactate, Blood: 7.2 mmol/L (11-11-24 @ 13:50)  Blood Gas Venous - Lactate: 7.60 mmol/L (11-11-24 @ 13:45)    Urinalysis Basic - ( 12 Nov 2024 20:50 )    Color: x / Appearance: x / SG: x / pH: x  Gluc: 148 mg/dL / Ketone: x  / Bili: x / Urobili: x   Blood: x / Protein: x / Nitrite: x   Leuk Esterase: x / RBC: x / WBC x   Sq Epi: x / Non Sq Epi: x / Bacteria: x      ======================Micro/Rad/Cardio=================  Telemtry: Reviewed   EKG: Reviewed  CXR: Reviewed  Culture: Reviewed   ======================================================  PAST MEDICAL & SURGICAL HISTORY:  Pancreatic cancer      HTN (hypertension)      Anemia        ====================ASSESSMENT ==============  73 y/o F with PMH of pancreatic cancer on chemotherapy, DVT on Eliquis 2.5mg BID, HTN, recent admission for symptomatic anemia requiring blood transfusion presenting with SOB as well as dark stools. F/w saddle PE on CTA chest.     Plan:  ====================== NEUROLOGY=====================  No active issues; A&Ox4  - neuro checks per CICU protocol    ==================== RESPIRATORY======================  #AHRF  - on high flow 50/100 91%-92%  -wean as tolerated  - trend ABGs     ====================CARDIOVASCULAR==================  # saddle PE   11/11 TTE:  EF 55-60%.  R. ventricular cavity and systolic function normal. interventricular septum flattened in systole and diastolic c/w R. ventricular pressure and volume overload.  - lactate elev to 7.2, now cleared 1.3  - uptrending proBNP 3000+   - trial heparin gtt for AC   -Pending vascular recs    ===================HEMATOLOGIC/ONC ===================  #Anemia  - p/w dark tarry stools  - transfuse for hgb< 7   - s/p 1u pRBC on 11/11   -11/12: Hgb continue to downtrend 10.9-->8.8    #pancreatic CA  - on chemo     DVT ppx: heparin gtt     heparin  Infusion 650 Unit(s)/Hr (10 mL/Hr) IV Continuous <Continuous>    ===================== RENAL =========================  No active issues  - monitor SCr, UOP  - monitor electrolytes and replete for goal K 4-4.5 and Mg >2     ==================== GASTROINTESTINAL===================  #NPO   - Pantoprazole 40mg IV push BID    magnesium sulfate  IVPB 1 Gram(s) IV Intermittent once  pantoprazole  Injectable 40 milliGRAM(s) IV Push two times a day  potassium chloride   Powder 40 milliEquivalent(s) Oral every 2 hours    =======================    ENDOCRINE  =====================  No active issues  - monitor blood glucose  - pending a1c, lipid profile     ========================INFECTIOUS DISEASE================  -11/11: CT abd/pelvis: Diffuse colonic and small bowel thickening indicative of colitis. Multilobulated cystic liver mass indicative of metastasis vs. abscess    #leukocytosis   - pending Bcx x2   -Pending MRSA swab  - zosyn 11/11-   -Vanc 11/12-    Dispo: Maintain in ICU.  FULL CODE    piperacillin/tazobactam IVPB.. 3.375 Gram(s) IV Intermittent every 8 hours    Patient requires continuous monitoring with bedside rhythm monitoring, pulse ox monitoring, and intermittent blood gas analysis. Care plan discussed with ICU care team. Patient remained critical and at risk for life threatening decompensation.  Patient seen, examined and plan discussed with CCU team during rounds.     I have personally provided __35__ minutes of critical care time excluding time spent on separate procedures, in addition to initial critical care time provided by the CICU Attending, Dr. Sam.     By signing my name below, I, Reji Stewart, attest that this documentation has been prepared under the direction and in the presence of Rosario Lopez NP  Electronically signed: Bonifacio Kaufman, 11-12-24 @ 21:56    I, Rosario Lopez NP, personally performed the services described in this documentation. all medical record entries made by the bonifacio were at my direction and in my presence. I have reviewed the chart and agree that the record reflects my personal performance and is accurate and complete  Electronically signed: Rosario Lopez NP

## 2024-11-13 NOTE — DIETITIAN INITIAL EVALUATION ADULT - OTHER CALCULATIONS
Defer fluid needs to team. Calculations accounting for factors of pancreatic cancer, skin integrity, malnutrition

## 2024-11-13 NOTE — DIETITIAN INITIAL EVALUATION ADULT - NSFNSNUTRHOMESUPPLEMENTFT_GEN_A_CORE
Patient has tried oral nutrition supplements in the past per grandson (i.e. naming Ensure), but states patient had been significantly resistant to them and would ultimately not take them

## 2024-11-13 NOTE — ADVANCED PRACTICE NURSE CONSULT - ASSESSMENT
Patient encountered on 4 CIC. When wound care RN arrived on unit, patient was found lying in a low air loss pressure redistribution support surface style bed with her family at bedside. Patient Alcides speaks Mandarin,  Kesha ID # [id number] utilized for interpretive services. Patient was alert and oriented and reluctant to turn. Ms Mcgrath is unable to turn independently and staff assistance x 1 was provided. Once turned, purewick external female urinary device noted, diverting urine from skin. The wound care RN was able to visualize an area of persistent nonblanchable purple hued discoloration over B/L buttocks/sacral skin, area measures approximately 6cm x 8cm x 0.1cm with a small area of superficial partial thickness skin loss noted over farzaneh sacrum- presentation is consistent with a deep tissue injury in evolution with incontinence involvement, present on admission. B/L heels with dark discoloration measuring approximately 3cm x 3cm x 0cm - presentation is consistent with a deep tissue injury present on admission. Once consult was complete, patient and family were educated regarding the need for routine turning and positioning to prevent pressure injuries and patient was placed in a right side-lying position utilizing pillow positioner assistive devices.

## 2024-11-13 NOTE — DIETITIAN INITIAL EVALUATION ADULT - PERTINENT LABORATORY DATA
11-13    143  |  108  |  18  ----------------------------<  159[H]  3.2[L]   |  25  |  0.70    Ca    7.9[L]      13 Nov 2024 04:28  Phos  3.5     11-13  Mg     2.0     11-13    TPro  6.8  /  Alb  2.0[L]  /  TBili  0.7  /  DBili  x   /  AST  31  /  ALT  19  /  AlkPhos  233[H]  11-13

## 2024-11-13 NOTE — DIETITIAN INITIAL EVALUATION ADULT - SIGNS/SYMPTOMS
pt w/ >7.5% wt loss x3mo, <75% PO >/= 3mo, moderate edema pt w/ pancreatic cancer and suspected deep tissue injuries per chart

## 2024-11-13 NOTE — DIETITIAN INITIAL EVALUATION ADULT - ORAL INTAKE PTA/DIET HISTORY
Per d/w patient's grandson, reports patient has had very poor PO intake/appetite for several weeks 2/2 pancreatic cancer. No chewing/swallowing deficit reported but states patient has been unable to tolerate any significant PO for weeks (minimal amount of liquids or noodles). Grandson reports that patient had a previous hospitalization that lasted 2 weeks and that patient didn't take any PO at all and was on 'IV nutrition' for nourishment (? TPN). Grandson denies patient ever having any form of a feeding tube in the past. Grandson reports patient would normally eat much better earlier on in the year before identification of pancreatic cancer and starting chemotherapy treatment. Grandson reports patient follows more of a vegetarian style diet but will have other animal products as well.

## 2024-11-13 NOTE — PROGRESS NOTE ADULT - SUBJECTIVE AND OBJECTIVE BOX
Patient seen and examined at bedside.    Overnight Events:   - Leukocytosis worsening with softer BPs  - Remains on BIPAP    REVIEW OF SYSTEMS:  limited    Current Meds:  chlorhexidine 2% Cloths 1 Application(s) Topical <User Schedule>  heparin  Infusion 650 Unit(s)/Hr IV Continuous <Continuous>  mupirocin 2% Nasal 1 Application(s) Both Nostrils two times a day  pantoprazole  Injectable 40 milliGRAM(s) IV Push two times a day  piperacillin/tazobactam IVPB.. 3.375 Gram(s) IV Intermittent every 8 hours      Vitals:  T(F): 99.7 (11-13), Max: 99.7 (11-13)  HR: 93 (11-13) (62 - 118)  BP: 93/50 (11-13) (87/41 - 152/71)  RR: 27 (11-13)  SpO2: 100% (11-13)  I&O's Summary    12 Nov 2024 07:01 - 13 Nov 2024 07:00  --------------------------------------------------------  IN: 864 mL / OUT: 2100 mL / NET: -1236 mL    13 Nov 2024 07:01  -  13 Nov 2024 12:47  --------------------------------------------------------  IN: 508 mL / OUT: 0 mL / NET: 508 mL        Physical Exam:  Appearance: On BIPAP  Cardiovascular: RRR, S1, S2, no murmurs, rubs, or gallops; no edema; no JVD  Respiratory: Clear to auscultation bilaterally  Gastrointestinal: soft, non-tender, non-distended with normal bowel sounds  Psychiatry: lethargic                          8.8    22.71 )-----------( 216      ( 12 Nov 2024 20:50 )             28.3     11-13    143  |  108  |  18  ----------------------------<  159[H]  3.2[L]   |  25  |  0.70    Ca    7.9[L]      13 Nov 2024 04:28  Phos  3.5     11-13  Mg     2.0     11-13    TPro  6.8  /  Alb  2.0[L]  /  TBili  0.7  /  DBili  x   /  AST  31  /  ALT  19  /  AlkPhos  233[H]  11-13    PT/INR - ( 13 Nov 2024 04:28 )   PT: 17.9 sec;   INR: 1.58 ratio         PTT - ( 13 Nov 2024 04:28 )  PTT:52.3 sec  CARDIAC MARKERS ( 11 Nov 2024 22:53 )  54 ng/L / x     / x     / x     / x     / x      CARDIAC MARKERS ( 11 Nov 2024 21:34 )  57 ng/L / x     / x     / x     / x     / x

## 2024-11-13 NOTE — CHART NOTE - NSCHARTNOTEFT_GEN_A_CORE
Discussed with patients , son, and grandson at bedside. All family members appointed patients son, Shon Solis, for final decision making. Family verbalized excellent understanding of patients current medical condition and overall poor prognosis. They stated patient was clear with them that she would not want extensive measures taken to prolong her life and did not want to be in pain/suffering. They were all firmly in agreement for DNR/DNI without escalation of care. They wished to continue current treatment therapies (anticoagulation for PE, supplemental O2, and abx). I explained to them PTT monitoring on heparin gtt and they did not want further blood draws. Given we have one therapeutic PTT, it will be kept at the current rate.   Family also wishes to speak to PCU team for end of life support.   MOLST placed in chart and PCU consult placed.

## 2024-11-13 NOTE — ADVANCED PRACTICE NURSE CONSULT - REASON FOR CONSULT
Wound care consult initiated by RN to assess patient's skin for a possible sacral deep tissue injury present on admission     Reason for Admission: saddle PE  History of Present Illness:   75 y/o F with PMH of pancreatic cancer on chemotherapy, DVT on Eliquis 2.5mg BID, HTN, recent admission for symptomatic anemia requiring blood transfusion presenting with SOB as well as dark stools. As per patient and grandson at bedside, patient was recently discharged on Eliquis after finding DVT.  Patient was also admitted for symptomatic anemia requiring blood transfusions, however did not receive an EGD and was discharged for outpatient follow-up with Protonix.  Patient developed some shortness of breath and was also having intermittent dark and bloody stools.  Denies any chest pain.

## 2024-11-13 NOTE — PROGRESS NOTE ADULT - ASSESSMENT
====================ASSESSMENT ======================  75 y/o F with PMH of pancreatic cancer on chemotherapy, DVT on Eliquis 2.5mg BID, HTN, recent admission for symptomatic anemia requiring blood transfusion presenting with SOB as well as dark stools. F/w saddle PE on CTA chest.     Plan:  ====================== NEUROLOGY=====================  No active issues; A&Ox4  - neuro checks per CICU protocol    ==================== RESPIRATORY======================  #AHRF  - on high flow 50/100 91%-92%  - 11/13:  BiPAP 50, 94%  -wean as tolerated  - trend ABGs   - s/p lasix 40 IVP     ====================CARDIOVASCULAR==================  # saddle PE   11/11 TTE:  EF 55-60%.  R. ventricular cavity and systolic function normal. interventricular septum flattened in systole and diastolic c/w r. ventricular pressure and volume overload.  - lactate elev to 7.2, now cleared 1.3  - uptrending proBNP 3000+   - trial heparin gtt for AC   -Vascular recs: no thrombectomy, c/w heparin    ===================HEMATOLOGIC/ONC ===================  #Anemia  - p/w dark tarry stools  - transfuse for hgb< 7   - s/p 1u pRBC on 11/11     #pancreatic CA  - on chemo     DVT ppx: heparin gtt     ===================== RENAL =========================  No active issues  - monitor SCr, UOP  - monitor electrolytes and replete for goal K 4-4.5 and Mg >2     ==================== GASTROINTESTINAL===================  -Full liquid diet  - Pantoprazole 40mg IV push BID    =======================    ENDOCRINE  =====================  No active issues  - monitor blood glucose  - pending a1c, lipid profile     ========================INFECTIOUS DISEASE================  -11/11: CT abd/pelvis: Diffuse colonic and small bowel thickening indicative of colitis. Multilobulated cystic liver mass indicative of metastasis vs. abscess    #leukocytosis   -Trend WBC and fever curve  -Blood cultures neg  -MRSA swab neg, (+)MSSA, start mupirocin  - zosyn 11/11-   -Vanc X1 11/12    Dispo: Maintain in ICU.  FULL CODE   ====================ASSESSMENT ======================  75 y/o F with PMH of pancreatic cancer on chemotherapy, DVT on Eliquis 2.5mg BID, HTN, recent admission for symptomatic anemia requiring blood transfusion presenting with SOB as well as dark stools. F/w saddle PE on CTA chest.     Plan:  ====================== NEUROLOGY=====================  No active issues; A&Ox4  - neuro checks per CICU protocol    ==================== RESPIRATORY======================  #AHRF  - on high flow 50/100 91%-92%  - 11/13:  BiPAP 50, 94%  -wean as tolerated  - trend ABGs   - s/p lasix 40 IVP     ====================CARDIOVASCULAR==================  # saddle PE   11/11 TTE:  EF 55-60%.  R. ventricular cavity and systolic function normal. interventricular septum flattened in systole and diastolic c/w r. ventricular pressure and volume overload.  11/12 TTE: Normal RV cavity and function, pA systolic pressure 29mmhg, Mild/Mod tricuspid regurgitation. no significant change from previous  - lactate elev to 7.2, now cleared 1.3  - uptrending proBNP 3000+   - trial heparin gtt for AC   -Vascular recs: no thrombectomy, c/w heparin    ===================HEMATOLOGIC/ONC ===================  #Anemia  - p/w dark tarry stools  - transfuse for hgb< 7   - s/p 1u pRBC on 11/11     #pancreatic CA  - on chemo     DVT ppx: heparin gtt     ===================== RENAL =========================  No active issues  - monitor SCr, UOP  - monitor electrolytes and replete for goal K 4-4.5 and Mg >2     ==================== GASTROINTESTINAL===================  -Full liquid diet  - Pantoprazole 40mg IV push BID    =======================    ENDOCRINE  =====================  No active issues  - monitor blood glucose  - pending a1c, lipid profile     ========================INFECTIOUS DISEASE================  -11/11: CT abd/pelvis: Diffuse colonic and small bowel thickening indicative of colitis. Multilobulated cystic liver mass indicative of metastasis vs. abscess    #leukocytosis   -Trend WBC and fever curve  -Blood cultures neg  -MRSA swab neg, (+)MSSA, start mupirocin  - zosyn 11/11-   -Vanc X1 11/12    Dispo: Maintain in ICU.  FULL CODE

## 2024-11-13 NOTE — CHART NOTE - NSCHARTNOTEFT_GEN_A_CORE
Interpreters used: #[id number] and #[id number]    Extensive discussion had with patients  at bedside regarding current medical condition. Patient was lethargic and nonverbal during conversation, unable to participate.  I explained her overall poor prognosis and explained that she is at end of life. Pt now on increasing respiratory support w/ bipap and BP is marginal likely to require pressor support. I discussed that starting pressor support is a temporary measure and will not ultimately treat her current condition, rather will prolong end of life. I also explained central line and tom if pressors were started, and pts  states he does not want her to go through pain and suffering. I discussed DNR/DNI, palliation, symptom focused treatment with him and he seemed very interested. He first wanted to discuss with his son.   I stepped out and came back few mins later and pts  states his son is coming in around 2PM and to continue all current medical care until further discussions in person. He is agreeable to start pressor support if needed, denies central line and tom. Pt remains full code at this time.   Will revisit this afternoon when son is at bedside for further discussions.

## 2024-11-13 NOTE — PROGRESS NOTE ADULT - SUBJECTIVE AND OBJECTIVE BOX
Patient is a 74y old  Female who presents with a chief complaint of saddle PE (13 Nov 2024 09:18)    Pt seen and examined at bedside in the CICU, family present.     MEDICATIONS  (STANDING):  chlorhexidine 2% Cloths 1 Application(s) Topical <User Schedule>  heparin  Infusion 650 Unit(s)/Hr (12 mL/Hr) IV Continuous <Continuous>  mupirocin 2% Nasal 1 Application(s) Both Nostrils two times a day  pantoprazole  Injectable 40 milliGRAM(s) IV Push two times a day  piperacillin/tazobactam IVPB.. 3.375 Gram(s) IV Intermittent every 8 hours  potassium chloride  10 mEq/100 mL IVPB 10 milliEquivalent(s) IV Intermittent every 2 hours    MEDICATIONS  (PRN):    Vital Signs Last 24 Hrs  T(C): 37.6 (13 Nov 2024 08:00), Max: 37.6 (13 Nov 2024 08:00)  T(F): 99.7 (13 Nov 2024 08:00), Max: 99.7 (13 Nov 2024 08:00)  HR: 118 (13 Nov 2024 09:00) (62 - 118)  BP: 116/75 (13 Nov 2024 09:00) (102/56 - 152/71)  BP(mean): 92 (13 Nov 2024 09:00) (76 - 102)  RR: 33 (13 Nov 2024 09:00) (15 - 33)  SpO2: 100% (13 Nov 2024 09:00) (50% - 100%)    Parameters below as of 13 Nov 2024 09:00  Patient On (Oxygen Delivery Method): BiPAP/CPAP    O2 Concentration (%): 50    PE  NAD  Awake, alert  Anicteric, MMM  No c/c/e  No rash grossly                        8.8    22.71 )-----------( 216      ( 12 Nov 2024 20:50 )             28.3     11-13    143  |  108  |  18  ----------------------------<  159[H]  3.2[L]   |  25  |  0.70    Ca    7.9[L]      13 Nov 2024 04:28  Phos  3.5     11-13  Mg     2.0     11-13    TPro  6.8  /  Alb  2.0[L]  /  TBili  0.7  /  DBili  x   /  AST  31  /  ALT  19  /  AlkPhos  233[H]  11-13

## 2024-11-13 NOTE — DIETITIAN INITIAL EVALUATION ADULT - REASON FOR ADMISSION
Other pulmonary embolism without acute cor pulmonale    Per chart, patient is a 75 y/o female with PMH including pancreatic cancer (on chemotherapy), h/o DVT (on Eliquis), HTN, h/o recent admission for symptomatic anemia requiring blood transfusion. Patient presents to [facility] w/ SOB, dark stools. Identified w/ saddle PE on CTA chest per MD.

## 2024-11-13 NOTE — DIETITIAN INITIAL EVALUATION ADULT - ENERGY INTAKE
Patient remains w/ very poor PO intake/appetite per grandson and patient unable to have any significant PO at present due to clinical status. Poor (<50%)

## 2024-11-13 NOTE — PROGRESS NOTE ADULT - ASSESSMENT
73 y/o F with PMH of pancreatic cancer on chemotherapy, DVT on Eliquis 2.5mg BID, HTN, recent admission for symptomatic anemia requiring blood transfusion presenting with SOB as well as dark stools found to have saddle PE.    1. Saddle PE  2. Hypoxic respiratory failure  3. Metastatic pancreatic cancer  4. Leukocytosis    RECOMMENDATION:  - Continue with full dose anti-coagulation with heparin gtt  - Ongoing GOC with family  - Not a candidate for advanced therapies given underlying comorbid conditions    Note not final until signed by attending       Hemalatha Bhagat MD  Cardiology Fellow PGY-7

## 2024-11-13 NOTE — DIETITIAN INITIAL EVALUATION ADULT - REASON INDICATOR FOR ASSESSMENT
Consult for "MST score 2 or >, pressure injury stage 2 or >"  Source: chart, patient's grandson (patient's grandson preferred to talk away from patient/family at time of visit in setting of patient's currently clinical status)  Chart reviewed, events noted

## 2024-11-13 NOTE — DIETITIAN INITIAL EVALUATION ADULT - PERTINENT MEDS FT
MEDICATIONS  (STANDING):  chlorhexidine 2% Cloths 1 Application(s) Topical <User Schedule>  heparin  Infusion 650 Unit(s)/Hr (12 mL/Hr) IV Continuous <Continuous>  mupirocin 2% Nasal 1 Application(s) Both Nostrils two times a day  pantoprazole  Injectable 40 milliGRAM(s) IV Push two times a day  piperacillin/tazobactam IVPB.. 3.375 Gram(s) IV Intermittent every 8 hours    MEDICATIONS  (PRN):

## 2024-11-13 NOTE — DIETITIAN INITIAL EVALUATION ADULT - REASON
Unable to perform NFPE 2/2 current clinical status of patient, meets criteria through PO intake and weight loss parameters

## 2024-11-13 NOTE — DIETITIAN INITIAL EVALUATION ADULT - ADD RECOMMEND
1. most recent clinical events noted & reviewed - await further determination of GOC  2. honor food preferences as able within context of diet order when patient is awake/alert/willing to accept PO and is medically appropriate  3. hypokalemia: supplementation noted, continue to follow electrolyte trends, replete levels as appropriate  4. if deemed medically appropriate pending further determination of GOC, consider vitamin C to help aid in wound healing  5. monitor progression of nutrition status, weight trend, electrolytes, blood glucose levels, labs, BMs, wound healing

## 2024-11-13 NOTE — DIETITIAN INITIAL EVALUATION ADULT - PERSON TAUGHT/METHOD
current diet order, patient's food preferences, all questions were answered/patient's grandson/verbal instruction/teach back - (Patient repeats in own words)

## 2024-11-13 NOTE — DIETITIAN INITIAL EVALUATION ADULT - ETIOLOGY
hypermetabolic demand for nutrients 2/2 skin integrity, chronic illness chronic illness (pancreatic cancer)

## 2024-11-13 NOTE — DIETITIAN INITIAL EVALUATION ADULT - OTHER INFO
NKFA per patient's grandson. Patient's grandson reports patient's recent UBW has been 100lbs and previously had weighed at least 110lbs in the past. [facility] noted as 104lbs (11/1/2024), 87lbs (8/3/2024). Will follow weight trend.    - Intermittent hypokalemia noted.

## 2024-11-13 NOTE — PROGRESS NOTE ADULT - SUBJECTIVE AND OBJECTIVE BOX
PATIENT:  KELLI SHELTON  [id number]    CHIEF COMPLAINT:  Patient is a 74y old  Female who presents with a chief complaint of saddle PE (12 Nov 2024 21:55)      INTERVAL HISTORY/OVERNIGHT EVENTS:  The patient was seen and examined at bedside.     Additional Review of Symptoms:     MEDICATIONS:  MEDICATIONS  (STANDING):  chlorhexidine 2% Cloths 1 Application(s) Topical <User Schedule>  heparin  Infusion 650 Unit(s)/Hr (12 mL/Hr) IV Continuous <Continuous>  pantoprazole  Injectable 40 milliGRAM(s) IV Push two times a day  piperacillin/tazobactam IVPB.. 3.375 Gram(s) IV Intermittent every 8 hours  potassium chloride  10 mEq/100 mL IVPB 10 milliEquivalent(s) IV Intermittent every 2 hours    MEDICATIONS  (PRN):      ALLERGIES:  Allergies    No Known Allergies    Intolerances        OBJECTIVE:  ICU Vital Signs Last 24 Hrs  T(C): 36.7 (13 Nov 2024 06:00), Max: 37.4 (12 Nov 2024 12:00)  T(F): 98 (13 Nov 2024 06:00), Max: 99.4 (12 Nov 2024 12:00)  HR: 85 (13 Nov 2024 08:36) (62 - 96)  BP: 116/59 (13 Nov 2024 07:00) (102/56 - 152/71)  BP(mean): 84 (13 Nov 2024 07:00) (76 - 102)  ABP: --  ABP(mean): --  RR: 30 (13 Nov 2024 07:00) (15 - 30)  SpO2: 97% (13 Nov 2024 08:36) (50% - 100%)    O2 Parameters below as of 13 Nov 2024 07:00  Patient On (Oxygen Delivery Method): BiPAP/CPAP    O2 Concentration (%): 50        Adult Advanced Hemodynamics Last 24 Hrs  CVP(mm Hg): --  CVP(cm H2O): --  CO: --  CI: --  PA: --  PA(mean): --  PCWP: --  SVR: --  SVRI: --  PVR: --  PVRI: --  CAPILLARY BLOOD GLUCOSE        CAPILLARY BLOOD GLUCOSE        I&O's Summary    12 Nov 2024 07:01  -  13 Nov 2024 07:00  --------------------------------------------------------  IN: 864 mL / OUT: 2100 mL / NET: -1236 mL      Daily     Daily     PHYSICAL EXAMINATION:  General: WN/WD NAD  HEENT: PERRLA, EOMI, moist mucous membranes  Neurology: A&Ox3, nonfocal, BARRIOS x 4  Respiratory: CTA B/L, normal respiratory effort, no wheezes, crackles, rales  CV: RRR, S1S2, no murmurs, rubs or gallops  Abdominal: Soft, NT, ND +BS, Last BM  Extremities: No edema, + peripheral pulses  Incisions:   Tubes:    LABS:  ABG - ( 13 Nov 2024 04:11 )  pH, Arterial: 7.45  pH, Blood: x     /  pCO2: 40    /  pO2: 230   / HCO3: 28    / Base Excess: 3.5   /  SaO2: 98.1                                    8.8    22.71 )-----------( 216      ( 12 Nov 2024 20:50 )             28.3     11-13    143  |  108  |  18  ----------------------------<  159[H]  3.2[L]   |  25  |  0.70    Ca    7.9[L]      13 Nov 2024 04:28  Phos  3.5     11-13  Mg     2.0     11-13    TPro  6.8  /  Alb  2.0[L]  /  TBili  0.7  /  DBili  x   /  AST  31  /  ALT  19  /  AlkPhos  233[H]  11-13    LIVER FUNCTIONS - ( 13 Nov 2024 04:28 )  Alb: 2.0 g/dL / Pro: 6.8 g/dL / ALK PHOS: 233 U/L / ALT: 19 U/L / AST: 31 U/L / GGT: x           PT/INR - ( 13 Nov 2024 04:28 )   PT: 17.9 sec;   INR: 1.58 ratio         PTT - ( 13 Nov 2024 04:28 )  PTT:52.3 sec        Urinalysis Basic - ( 13 Nov 2024 04:28 )    Color: x / Appearance: x / SG: x / pH: x  Gluc: 159 mg/dL / Ketone: x  / Bili: x / Urobili: x   Blood: x / Protein: x / Nitrite: x   Leuk Esterase: x / RBC: x / WBC x   Sq Epi: x / Non Sq Epi: x / Bacteria: x        TELEMETRY:     EKG:     IMAGING:       PATIENT:  KELLI SHELTON  [id number]    CHIEF COMPLAINT:  Patient is a 74y old  Female who presents with a chief complaint of saddle PE (12 Nov 2024 21:55)      INTERVAL HISTORY/OVERNIGHT EVENTS:  The patient was seen and examined at bedside.     Additional Review of Symptoms:     MEDICATIONS:  MEDICATIONS  (STANDING):  chlorhexidine 2% Cloths 1 Application(s) Topical <User Schedule>  heparin  Infusion 650 Unit(s)/Hr (12 mL/Hr) IV Continuous <Continuous>  pantoprazole  Injectable 40 milliGRAM(s) IV Push two times a day  piperacillin/tazobactam IVPB.. 3.375 Gram(s) IV Intermittent every 8 hours  potassium chloride  10 mEq/100 mL IVPB 10 milliEquivalent(s) IV Intermittent every 2 hours    MEDICATIONS  (PRN):      ALLERGIES:  Allergies    No Known Allergies    Intolerances        OBJECTIVE:  ICU Vital Signs Last 24 Hrs  T(C): 36.7 (13 Nov 2024 06:00), Max: 37.4 (12 Nov 2024 12:00)  T(F): 98 (13 Nov 2024 06:00), Max: 99.4 (12 Nov 2024 12:00)  HR: 85 (13 Nov 2024 08:36) (62 - 96)  BP: 116/59 (13 Nov 2024 07:00) (102/56 - 152/71)  BP(mean): 84 (13 Nov 2024 07:00) (76 - 102)  ABP: --  ABP(mean): --  RR: 30 (13 Nov 2024 07:00) (15 - 30)  SpO2: 97% (13 Nov 2024 08:36) (50% - 100%)    O2 Parameters below as of 13 Nov 2024 07:00  Patient On (Oxygen Delivery Method): BiPAP/CPAP    O2 Concentration (%): 50        Adult Advanced Hemodynamics Last 24 Hrs  CVP(mm Hg): --  CVP(cm H2O): --  CO: --  CI: --  PA: --  PA(mean): --  PCWP: --  SVR: --  SVRI: --  PVR: --  PVRI: --  CAPILLARY BLOOD GLUCOSE        CAPILLARY BLOOD GLUCOSE        I&O's Summary    12 Nov 2024 07:01  -  13 Nov 2024 07:00  --------------------------------------------------------  IN: 864 mL / OUT: 2100 mL / NET: -1236 mL      Daily     Daily     PHYSICAL EXAMINATION:  General: WN/WD NAD  HEENT: PERRLA, EOMI, moist mucous membranes  Neurology: A&Ox3, nonfocal, BARRIOS x 4  Respiratory: CTA B/L, normal respiratory effort, no wheezes, crackles, rales  CV: RRR, S1S2, no murmurs, rubs or gallops  Abdominal: Soft, NT, ND +BS, Last BM  Extremities: No edema, + peripheral pulses  Incisions:   Tubes:    LABS:  ABG - ( 13 Nov 2024 04:11 )  pH, Arterial: 7.45  pH, Blood: x     /  pCO2: 40    /  pO2: 230   / HCO3: 28    / Base Excess: 3.5   /  SaO2: 98.1                                    8.8    22.71 )-----------( 216      ( 12 Nov 2024 20:50 )             28.3     11-13    143  |  108  |  18  ----------------------------<  159[H]  3.2[L]   |  25  |  0.70    Ca    7.9[L]      13 Nov 2024 04:28  Phos  3.5     11-13  Mg     2.0     11-13    TPro  6.8  /  Alb  2.0[L]  /  TBili  0.7  /  DBili  x   /  AST  31  /  ALT  19  /  AlkPhos  233[H]  11-13    LIVER FUNCTIONS - ( 13 Nov 2024 04:28 )  Alb: 2.0 g/dL / Pro: 6.8 g/dL / ALK PHOS: 233 U/L / ALT: 19 U/L / AST: 31 U/L / GGT: x           PT/INR - ( 13 Nov 2024 04:28 )   PT: 17.9 sec;   INR: 1.58 ratio         PTT - ( 13 Nov 2024 04:28 )  PTT:52.3 sec        Urinalysis Basic - ( 13 Nov 2024 04:28 )    Color: x / Appearance: x / SG: x / pH: x  Gluc: 159 mg/dL / Ketone: x  / Bili: x / Urobili: x   Blood: x / Protein: x / Nitrite: x   Leuk Esterase: x / RBC: x / WBC x   Sq Epi: x / Non Sq Epi: x / Bacteria: x       PATIENT:  KELLI SHELTON  [id number]    CHIEF COMPLAINT:  Patient is a 74y old  Female who presents with a chief complaint of saddle PE (12 Nov 2024 21:55)      INTERVAL HISTORY/OVERNIGHT EVENTS:  The patient was seen and examined at bedside.     Additional Review of Symptoms:     MEDICATIONS:  MEDICATIONS  (STANDING):  chlorhexidine 2% Cloths 1 Application(s) Topical <User Schedule>  heparin  Infusion 650 Unit(s)/Hr (12 mL/Hr) IV Continuous <Continuous>  pantoprazole  Injectable 40 milliGRAM(s) IV Push two times a day  piperacillin/tazobactam IVPB.. 3.375 Gram(s) IV Intermittent every 8 hours  potassium chloride  10 mEq/100 mL IVPB 10 milliEquivalent(s) IV Intermittent every 2 hours    MEDICATIONS  (PRN):      ALLERGIES:  Allergies    No Known Allergies    Intolerances        OBJECTIVE:  ICU Vital Signs Last 24 Hrs  T(C): 36.7 (13 Nov 2024 06:00), Max: 37.4 (12 Nov 2024 12:00)  T(F): 98 (13 Nov 2024 06:00), Max: 99.4 (12 Nov 2024 12:00)  HR: 85 (13 Nov 2024 08:36) (62 - 96)  BP: 116/59 (13 Nov 2024 07:00) (102/56 - 152/71)  BP(mean): 84 (13 Nov 2024 07:00) (76 - 102)  ABP: --  ABP(mean): --  RR: 30 (13 Nov 2024 07:00) (15 - 30)  SpO2: 97% (13 Nov 2024 08:36) (50% - 100%)    O2 Parameters below as of 13 Nov 2024 07:00  Patient On (Oxygen Delivery Method): BiPAP/CPAP    O2 Concentration (%): 50        Adult Advanced Hemodynamics Last 24 Hrs  CVP(mm Hg): --  CVP(cm H2O): --  CO: --  CI: --  PA: --  PA(mean): --  PCWP: --  SVR: --  SVRI: --  PVR: --  PVRI: --  CAPILLARY BLOOD GLUCOSE        CAPILLARY BLOOD GLUCOSE        I&O's Summary    12 Nov 2024 07:01  -  13 Nov 2024 07:00  --------------------------------------------------------  IN: 864 mL / OUT: 2100 mL / NET: -1236 mL      Daily     Daily     PHYSICAL EXAMINATION:  General: WN/WD NAD  HEENT: PERRLA, EOMI, moist mucous membranes  Neurology: A&Ox3, nonfocal, BARRIOS x 4  Respiratory: CTA B/L,  CV: RRR,  Abdominal: Soft, NT, moderately distended  Extremities: 3+ pitting edema to lower extremities, + peripheral pulses    LABS:  ABG - ( 13 Nov 2024 04:11 )  pH, Arterial: 7.45  pH, Blood: x     /  pCO2: 40    /  pO2: 230   / HCO3: 28    / Base Excess: 3.5   /  SaO2: 98.1                                    8.8    22.71 )-----------( 216      ( 12 Nov 2024 20:50 )             28.3     11-13    143  |  108  |  18  ----------------------------<  159[H]  3.2[L]   |  25  |  0.70    Ca    7.9[L]      13 Nov 2024 04:28  Phos  3.5     11-13  Mg     2.0     11-13    TPro  6.8  /  Alb  2.0[L]  /  TBili  0.7  /  DBili  x   /  AST  31  /  ALT  19  /  AlkPhos  233[H]  11-13    LIVER FUNCTIONS - ( 13 Nov 2024 04:28 )  Alb: 2.0 g/dL / Pro: 6.8 g/dL / ALK PHOS: 233 U/L / ALT: 19 U/L / AST: 31 U/L / GGT: x           PT/INR - ( 13 Nov 2024 04:28 )   PT: 17.9 sec;   INR: 1.58 ratio         PTT - ( 13 Nov 2024 04:28 )  PTT:52.3 sec        Urinalysis Basic - ( 13 Nov 2024 04:28 )    Color: x / Appearance: x / SG: x / pH: x  Gluc: 159 mg/dL / Ketone: x  / Bili: x / Urobili: x   Blood: x / Protein: x / Nitrite: x   Leuk Esterase: x / RBC: x / WBC x   Sq Epi: x / Non Sq Epi: x / Bacteria: x       PATIENT:  KELLI SHELTON  [id number]    CHIEF COMPLAINT:  Patient is a 74y old  Female who presents with a chief complaint of saddle PE (12 Nov 2024 21:55)      INTERVAL HISTORY/OVERNIGHT EVENTS:  The patient was seen and examined at bedside.     Additional Review of Symptoms:     MEDICATIONS:  MEDICATIONS  (STANDING):  chlorhexidine 2% Cloths 1 Application(s) Topical <User Schedule>  heparin  Infusion 650 Unit(s)/Hr (12 mL/Hr) IV Continuous <Continuous>  pantoprazole  Injectable 40 milliGRAM(s) IV Push two times a day  piperacillin/tazobactam IVPB.. 3.375 Gram(s) IV Intermittent every 8 hours  potassium chloride  10 mEq/100 mL IVPB 10 milliEquivalent(s) IV Intermittent every 2 hours    MEDICATIONS  (PRN):      ALLERGIES:  Allergies    No Known Allergies    Intolerances        OBJECTIVE:  ICU Vital Signs Last 24 Hrs  T(C): 36.7 (13 Nov 2024 06:00), Max: 37.4 (12 Nov 2024 12:00)  T(F): 98 (13 Nov 2024 06:00), Max: 99.4 (12 Nov 2024 12:00)  HR: 85 (13 Nov 2024 08:36) (62 - 96)  BP: 116/59 (13 Nov 2024 07:00) (102/56 - 152/71)  BP(mean): 84 (13 Nov 2024 07:00) (76 - 102)  ABP: --  ABP(mean): --  RR: 30 (13 Nov 2024 07:00) (15 - 30)  SpO2: 97% (13 Nov 2024 08:36) (50% - 100%)    O2 Parameters below as of 13 Nov 2024 07:00  Patient On (Oxygen Delivery Method): BiPAP/CPAP    O2 Concentration (%): 50        Adult Advanced Hemodynamics Last 24 Hrs  CVP(mm Hg): --  CVP(cm H2O): --  CO: --  CI: --  PA: --  PA(mean): --  PCWP: --  SVR: --  SVRI: --  PVR: --  PVRI: --  CAPILLARY BLOOD GLUCOSE        CAPILLARY BLOOD GLUCOSE        I&O's Summary    12 Nov 2024 07:01  -  13 Nov 2024 07:00  --------------------------------------------------------  IN: 864 mL / OUT: 2100 mL / NET: -1236 mL      Daily     Daily     PHYSICAL EXAMINATION:  General: frail   HEENT: PERRLA, EOMI, moist mucous membranes  Neurology: lethargic   Respiratory: CTA B/L,  CV: RRR,  Abdominal: Soft, NT, moderately distended  Extremities: 3+ pitting edema to lower extremities, + peripheral pulses    LABS:  ABG - ( 13 Nov 2024 04:11 )  pH, Arterial: 7.45  pH, Blood: x     /  pCO2: 40    /  pO2: 230   / HCO3: 28    / Base Excess: 3.5   /  SaO2: 98.1                                    8.8    22.71 )-----------( 216      ( 12 Nov 2024 20:50 )             28.3     11-13    143  |  108  |  18  ----------------------------<  159[H]  3.2[L]   |  25  |  0.70    Ca    7.9[L]      13 Nov 2024 04:28  Phos  3.5     11-13  Mg     2.0     11-13    TPro  6.8  /  Alb  2.0[L]  /  TBili  0.7  /  DBili  x   /  AST  31  /  ALT  19  /  AlkPhos  233[H]  11-13    LIVER FUNCTIONS - ( 13 Nov 2024 04:28 )  Alb: 2.0 g/dL / Pro: 6.8 g/dL / ALK PHOS: 233 U/L / ALT: 19 U/L / AST: 31 U/L / GGT: x           PT/INR - ( 13 Nov 2024 04:28 )   PT: 17.9 sec;   INR: 1.58 ratio         PTT - ( 13 Nov 2024 04:28 )  PTT:52.3 sec        Urinalysis Basic - ( 13 Nov 2024 04:28 )    Color: x / Appearance: x / SG: x / pH: x  Gluc: 159 mg/dL / Ketone: x  / Bili: x / Urobili: x   Blood: x / Protein: x / Nitrite: x   Leuk Esterase: x / RBC: x / WBC x   Sq Epi: x / Non Sq Epi: x / Bacteria: x

## 2024-11-13 NOTE — ADVANCED PRACTICE NURSE CONSULT - RECOMMEDATIONS
Impression:     B/L heel hyperpigmentation, cannot rule out a deep tissue injury present on admission   B/L buttocks/sacral deep tissue injury in evolution, present on admission   fecal incontinence   urinary incontinence     Recommendations:     1) turn and position q2 and PRN utilizing offloading assistive devices  2) routine pericare daily and PRN soiling  3) encourage optimal nutrition  4) waffle cushion when oob to chair  5) B/L LE complete cair air fluidized boots or sanjeev-lock pillow to offload heels/feet  6) triad protective barrier cream to B/L buttocks/sacrum daily and PRN soiling  7) incontinence management - consider external urinary catheter to divert urine from skin if incontinent    Plan discussed with AGNES Sloan on unit    For questions/comments regarding the recommendations in this consult, please contact Nuria at [phone]. Wound care will not actively follow, please place future consults for new concerns. Thank you!

## 2024-11-13 NOTE — PROGRESS NOTE ADULT - SUBJECTIVE AND OBJECTIVE BOX
KELLI SHELTON  MRN-[id number]  Patient is a 74y old  Female who presents with a chief complaint of Other pulmonary embolism without acute cor pulmonale    Per chart, patient is a 75 y/o female with PMH including pancreatic cancer (on chemotherapy), h/o DVT (on Eliquis), HTN, h/o recent admission for symptomatic anemia requiring blood transfusion. Patient presents to [facility] w/ SOB, dark stools. Identified w/ saddle PE on CTA chest per MD. (13 Nov 2024 16:19)    HPI:  75 y/o F with PMH of pancreatic cancer on chemotherapy, DVT on Eliquis 2.5mg BID, HTN, recent admission for symptomatic anemia requiring blood transfusion presenting with SOB as well as dark stools. As per patient and grandson at bedside, patient was recently discharged on Eliquis after finding DVT.  Patient was also admitted for symptomatic anemia requiring blood transfusions, however did not receive an EGD and was discharged for outpatient follow-up with Protonix.  Patient developed some shortness of breath and was also having intermittent dark and bloody stools.  Denies any chest pain.      (11 Nov 2024 21:14)      Hospital Course:    24 HOUR EVENTS: PT was made DNI/DNR and comfort care with no escalation of care.     REVIEW OF SYSTEMS:  More lethargic, not answering questions       ICU Vital Signs Last 24 Hrs  T(C): 36.5 (13 Nov 2024 20:00), Max: 37.6 (13 Nov 2024 08:00)  T(F): 97.7 (13 Nov 2024 20:00), Max: 99.7 (13 Nov 2024 08:00)  HR: 64 (13 Nov 2024 21:30) (61 - 118)  BP: 91/54 (13 Nov 2024 20:00) (81/52 - 139/64)  BP(mean): 68 (13 Nov 2024 20:00) (59 - 92)  ABP: --  ABP(mean): --  RR: 29 (13 Nov 2024 21:30) (15 - 38)  SpO2: 91% (13 Nov 2024 21:30) (50% - 100%)    O2 Parameters below as of 13 Nov 2024 22:00  Patient On (Oxygen Delivery Method): BiPAP/CPAP    O2 Concentration (%): 65        CAPILLARY BLOOD GLUCOSE          PHYSICAL EXAM:  GENERAL: No acute distress, well-developed  HEAD:  Atraumatic, Normocephalic  EYES: EOMI, PERRLA, conjunctiva and sclera clear  NECK: Supple, no lymphadenopathy, no JVD  CHEST/LUNG: CTAB; No wheezes, rales, or rhonchi  HEART: Regular rate and rhythm. Normal S1/S2. No murmurs, rubs, or gallops  ABDOMEN: Soft, non-tender, non-distended; normal bowel sounds, no organomegaly  EXTREMITIES:  2+ edema, 2+ peripheral pulses b/l, No clubbing or cyanosis  NEUROLOGY: lethargic   SKIN: No rashes or lesions    ============================I/O===========================   I&O's Detail    12 Nov 2024 07:01  -  13 Nov 2024 07:00  --------------------------------------------------------  IN:    Heparin: 239 mL    IV PiggyBack: 575 mL    Oral Fluid: 50 mL  Total IN: 864 mL    OUT:    Voided (mL): 2100 mL  Total OUT: 2100 mL    Total NET: -1236 mL      13 Nov 2024 07:01  -  13 Nov 2024 22:20  --------------------------------------------------------  IN:    Heparin: 180 mL    IV PiggyBack: 130 mL    IV PiggyBack: 725 mL  Total IN: 1035 mL    OUT:    Voided (mL): 0 mL  Total OUT: 0 mL    Total NET: 1035 mL        ============================ LABS =========================                        8.8    22.71 )-----------( 216      ( 12 Nov 2024 20:50 )             28.3     11-13    143  |  108  |  18  ----------------------------<  159[H]  3.2[L]   |  25  |  0.70    Ca    7.9[L]      13 Nov 2024 04:28  Phos  3.5     11-13  Mg     2.0     11-13    TPro  6.8  /  Alb  2.0[L]  /  TBili  0.7  /  DBili  x   /  AST  31  /  ALT  19  /  AlkPhos  233[H]  11-13                LIVER FUNCTIONS - ( 13 Nov 2024 04:28 )  Alb: 2.0 g/dL / Pro: 6.8 g/dL / ALK PHOS: 233 U/L / ALT: 19 U/L / AST: 31 U/L / GGT: x           PT/INR - ( 13 Nov 2024 04:28 )   PT: 17.9 sec;   INR: 1.58 ratio         PTT - ( 13 Nov 2024 12:37 )  PTT:66.8 sec  ABG - ( 13 Nov 2024 04:11 )  pH, Arterial: 7.45  pH, Blood: x     /  pCO2: 40    /  pO2: 230   / HCO3: 28    / Base Excess: 3.5   /  SaO2: 98.1              Blood Gas Arterial, Lactate: 1.5 mmol/L (11-13-24 @ 04:11)    Urinalysis Basic - ( 13 Nov 2024 04:28 )    Color: x / Appearance: x / SG: x / pH: x  Gluc: 159 mg/dL / Ketone: x  / Bili: x / Urobili: x   Blood: x / Protein: x / Nitrite: x   Leuk Esterase: x / RBC: x / WBC x   Sq Epi: x / Non Sq Epi: x / Bacteria: x      ======================Micro/Rad/Cardio=================  Telemtry: Reviewed   EKG: Reviewed  CXR: Reviewed  Culture: Reviewed   Echo:   Cath:   ======================================================  PAST MEDICAL & SURGICAL HISTORY:  Pancreatic cancer      HTN (hypertension)      Anemia        ====================ASSESSMENT ==============  75 y/o F with PMH of pancreatic cancer on chemotherapy, DVT on Eliquis 2.5mg BID, HTN, recent admission for symptomatic anemia requiring blood transfusion presenting with SOB as well as dark stools. F/w saddle PE on CTA chest.     Plan:  ====================== NEUROLOGY=====================  No active issues; A&Ox4  - neuro checks per CICU protocol    ==================== RESPIRATORY======================  #AHRF  - on high flow 50/100 91%-92%  -wean as tolerated  - trend ABGs     ====================CARDIOVASCULAR==================  # saddle PE   11/11 TTE:  EF 55-60%.  R. ventricular cavity and systolic function normal. interventricular septum flattened in systole and diastolic c/w R. ventricular pressure and volume overload.  - lactate elev to 7.2, now cleared 1.3  - uptrending proBNP 3000+   - trial heparin gtt for AC   -Pending vascular recs    ===================HEMATOLOGIC/ONC ===================  #Anemia  - p/w dark tarry stools  - transfuse for hgb< 7   - s/p 1u pRBC on 11/11   -11/12: Hgb continue to downtrend 10.9-->8.8    #pancreatic CA  - on chemo     DVT ppx: heparin gtt     heparin  Infusion 650 Unit(s)/Hr (10 mL/Hr) IV Continuous <Continuous>    ===================== RENAL =========================  No active issues  - monitor SCr, UOP  - monitor electrolytes and replete for goal K 4-4.5 and Mg >2     ==================== GASTROINTESTINAL===================  #NPO   - Pantoprazole 40mg IV push BID    magnesium sulfate  IVPB 1 Gram(s) IV Intermittent once  pantoprazole  Injectable 40 milliGRAM(s) IV Push two times a day  potassium chloride   Powder 40 milliEquivalent(s) Oral every 2 hours    =======================    ENDOCRINE  =====================  No active issues  - monitor blood glucose  - pending a1c, lipid profile     ========================INFECTIOUS DISEASE================  -11/11: CT abd/pelvis: Diffuse colonic and small bowel thickening indicative of colitis. Multilobulated cystic liver mass indicative of metastasis vs. abscess    #leukocytosis   - pending Bcx x2   -Pending MRSA swab  - zosyn 11/11-   -Vanc 11/12-11/13          Patient requires continuous monitoring with bedside rhythm monitoring, pulse ox monitoring, and intermittent blood gas analysis. Care plan discussed with ICU care team. Patient remained critical and at risk for life threatening decompensation.  Patient seen, examined and plan discussed with CCU team during rounds.       I have personally provided __35__ minutes of critical care time excluding time spent on separate procedures, in addition to initial critical care time provided by the CICU Attending, Dr. Sam

## 2024-11-13 NOTE — DIETITIAN INITIAL EVALUATION ADULT - NUTRITIONGOAL OUTCOME2
- patient will receive >75% of estimated nutritional needs daily via most safe and appropriate nutrition regimen pending further clinical course and that is within the patient and family's GOC

## 2024-11-14 NOTE — CONSULT NOTE ADULT - PROBLEM SELECTOR RECOMMENDATION 9
CT Angio Chest PE Protocol w/ IV Cont (11.11.24 @ 16:36) Saddle pulmonary embolus. No evidence of right heart strain. Diffuse symmetric ground glass and consolidation which is nonspecific. Differential includes edema, hemorrhage, and infection. Multilobulated cystic liver mass. Differential diagnosis includes  metastasis or abscess.  Diffuse colonic and small bowel wall thickening compatible with enterocolitis. 1 cm nodular filling defect in the portal vein, suggestive of thrombus,  either tumor thrombus or bland thrombus.  - Family would like to continue heparin drip without further blood draws  - Management per primary team

## 2024-11-14 NOTE — PROGRESS NOTE ADULT - ASSESSMENT
Plan:  ====================== NEUROLOGY=====================  No active issues; A&Ox4  - neuro checks per CICU protocol    ==================== RESPIRATORY======================  #AHRF  - on high flow 50/100 91%-92%  - 11/13:  BiPAP 50, 94%  -wean as tolerated    ====================CARDIOVASCULAR==================  # saddle PE   11/11 TTE:  EF 55-60%.  R. ventricular cavity and systolic function normal. interventricular septum flattened in systole and diastolic c/w r. ventricular pressure and volume overload.  - lactate elev to 7.2, now cleared 1.3  - uptrending proBNP 3000+   - trial heparin gtt for AC   -Vascular recs: no thrombectomy, c/w heparin    ===================HEMATOLOGIC/ONC ===================  #Anemia  - p/w dark tarry stools  - s/p 1u pRBC on 11/11     #pancreatic CA  - on chemo     DVT ppx: heparin gtt - no titration    ===================== RENAL =========================  No active issues  -primafit    ==================== GASTROINTESTINAL===================  - NPO  - Pantoprazole 40mg IV push BID    =======================    ENDOCRINE  =====================  No active issues  - monitor blood glucose  - pending a1c, lipid profile     ========================INFECTIOUS DISEASE================  -11/11: CT abd/pelvis: Diffuse colonic and small bowel thickening indicative of colitis. Multilobulated cystic liver mass indicative of metastasis vs. abscess    #leukocytosis   -Trend WBC and fever curve  -Blood cultures neg  -MRSA swab neg, (+)MSSA, start mupirocin  - zosyn 11/11-   -Vanc X1 11/12    Dispo: Downgrade to floors/PCU   DNR/DNI     Plan:  ====================== NEUROLOGY=====================  No active issues  - neuro checks per CICU protocol    ==================== RESPIRATORY======================  #AHRF  - on high flow 50/100 91%-92%  - 11/13:  BiPAP 50, 94%  -wean as tolerated    ====================CARDIOVASCULAR==================  # saddle PE   11/11 TTE:  EF 55-60%.  R. ventricular cavity and systolic function normal. interventricular septum flattened in systole and diastolic c/w r. ventricular pressure and volume overload.  - lactate elev to 7.2, now cleared 1.3  - uptrending proBNP 3000+   - trial heparin gtt for AC   -Vascular recs: no thrombectomy, c/w heparin    ===================HEMATOLOGIC/ONC ===================  #Anemia  - p/w dark tarry stools  - s/p 1u pRBC on 11/11     #pancreatic CA  - on chemo     DVT ppx: heparin gtt - no titration    ===================== RENAL =========================  No active issues  -primafit    ==================== GASTROINTESTINAL===================  - NPO  - Pantoprazole 40mg IV push BID    =======================    ENDOCRINE  =====================  No active issues  - monitor blood glucose      ========================INFECTIOUS DISEASE================  -11/11: CT abd/pelvis: Diffuse colonic and small bowel thickening indicative of colitis. Multilobulated cystic liver mass indicative of metastasis vs. abscess    #leukocytosis   -Trend WBC and fever curve  -Blood cultures neg  -MRSA swab neg, (+)MSSA, start mupirocin  - zosyn 11/11-   -Vanc X1 11/12    Dispo: Downgrade to floors/PCU   DNR/DNI

## 2024-11-14 NOTE — CHART NOTE - NSCHARTNOTEFT_GEN_A_CORE
Discussed with pt's son and grandson that in order to have a heparin gtt we need daily AM labs to see if it is therapeutic. Palliative Care Unit does not do daily blood draws as per palliative team. Pt's son and grandson understands and still would like heparin gtt and agrees to daily AM labs today. Pt is still DNR/DNI, no pressors.

## 2024-11-14 NOTE — CONSULT NOTE ADULT - PROBLEM SELECTOR RECOMMENDATION 4
GaP team consulted for complex medical decision making in the setting of serious illness.       In the event patient is symptomatic and family wants to prioritize symptoms would recommend the following:  - IV Morphine 1 mg q2hrs PRN moderate pain   - IV Morphine 2 mg q2 PRN pain/dyspnea  - IV Ativan 0.25mg q2 PRN anxiety/agitation/ refractory dyspnea  - IV Robinul 0.4mg q6 PRN  Turn and position for postural drainage.    Jane Epstein MD   Geriatrics and Palliative Care Attending   [facility]     Please contact me via Teams from Monday through Friday between 9am-5pm.     After 5pm and on weekends, please see the contact information below:    In the event of newly developing, evolving, or worsening symptoms, please contact the Palliative Medicine team via pager (if the patient is at [facility] #[phone] or if the patient is at [facility] #[phone]) The Geriatric and Palliative Medicine service has coverage 24 hours a day/ 7 days a week to provide medical recommendations regarding symptom management needs via telephone.

## 2024-11-14 NOTE — CONSULT NOTE ADULT - ASSESSMENT
73 y/o F with PMH of pancreatic cancer on chemotherapy, DVT on Eliquis 2.5mg BID, HTN, recent admission for symptomatic anemia requiring blood transfusion presenting with SOB as well as dark stools found to have saddle PE. GaP team consulted for complex medical decision making in the setting of serious illness and symptoms management.

## 2024-11-14 NOTE — CONSULT NOTE ADULT - CONVERSATION DETAILS
First GaP team introduced themselves and role in patient care. Family verbalized understanding and was receptive with palliative care consult. We reviewed the patient's medical and social history, as well as the events that led to her hospitalization.     Son Julianne states that he is the HCP. Grandson states that yesterday family opted for comfort measures, in order to improve patient comfort. However patient still on heparin drip, family would like to continue current medical care without blood draw, explained that is not safe to have heparin drip without blood work due to risk of bleeding. Offered PCU transfer PCU for management of symptoms, family states that would like to avoid opioids given can affect patient mentation. Family requested time to process information, would like to continue current level of care for now.     We answered all the questions that arose during our discussion and provided emotional support.

## 2024-11-14 NOTE — PROGRESS NOTE ADULT - SUBJECTIVE AND OBJECTIVE BOX
Patient is a 74y old  Female who presents with a chief complaint of saddle PE (14 Nov 2024 10:48)    Patient seen and examined  Appears comfortable. Family at bedside    MEDICATIONS  (STANDING):  heparin  Infusion 650 Unit(s)/Hr (12 mL/Hr) IV Continuous <Continuous>  pantoprazole  Injectable 40 milliGRAM(s) IV Push two times a day  piperacillin/tazobactam IVPB.. 3.375 Gram(s) IV Intermittent every 8 hours    MEDICATIONS  (PRN):    Vital Signs Last 24 Hrs  T(C): 36.9 (14 Nov 2024 07:00), Max: 37.1 (14 Nov 2024 00:00)  T(F): 98.4 (14 Nov 2024 07:00), Max: 98.8 (14 Nov 2024 00:00)  HR: 70 (14 Nov 2024 13:16) (61 - 82)  BP: 103/51 (14 Nov 2024 11:05) (81/52 - 103/51)  BP(mean): 73 (14 Nov 2024 11:05) (62 - 75)  RR: 21 (14 Nov 2024 11:05) (21 - 36)  SpO2: 98% (14 Nov 2024 13:16) (86% - 98%)    Parameters below as of 14 Nov 2024 08:00  Patient On (Oxygen Delivery Method): BiPAP/CPAP    O2 Concentration (%): 65    PE  Anicteric,  Abd soft, NT, ND  No c/c                        8.8    22.71 )-----------( 216      ( 12 Nov 2024 20:50 )             28.3       11-13    143  |  108  |  18  ----------------------------<  159[H]  3.2[L]   |  25  |  0.70    Ca    7.9[L]      13 Nov 2024 04:28  Phos  3.5     11-13  Mg     2.0     11-13    TPro  6.8  /  Alb  2.0[L]  /  TBili  0.7  /  DBili  x   /  AST  31  /  ALT  19  /  AlkPhos  233[H]  11-13

## 2024-11-14 NOTE — CONSULT NOTE ADULT - SUBJECTIVE AND OBJECTIVE BOX
Date of Service: 11-14-24 @ 10:49    HPI:73 y/o F with PMH of pancreatic cancer on chemotherapy, DVT on Eliquis 2.5mg BID, HTN, recent admission for symptomatic anemia requiring blood transfusion presenting with SOB as well as dark stools. As per patient and grandson at bedside, patient was recently discharged on Eliquis after finding DVT.  Patient was also admitted for symptomatic anemia requiring blood transfusions, however did not receive an EGD and was discharged for outpatient follow-up with Protonix.  Patient developed some shortness of breath and was also having intermittent dark and bloody stools.  Denies any chest pain.  (11 Nov 2024 21:14)    GaP team consulted for complex medical decision making in the setting of serious illness and symptoms management.  Patient seen and examined this morning. Patient appears lethargic, arousable to voice and maintains eyes open for several seconds, then gradually falls back asleep. Follows occasional simple commands.    Family at bedside. GaP team met with pérez Han and grandson, states that yesterday family opted for comfort measures, however would like to continue heparin drip without blood draws, explained that is not safe to have heparin drip without blood draw. Offered PCU transfer, family not interested at this time, mentioned that patient is asymptomatic and would like to avoid opioids given can cause confusion and lethargy.       PERTINENT PM/SXH:   Pancreatic cancer  HTN (hypertension)    Anemia        FAMILY HISTORY:      SOCIAL HISTORY:   Significant other/partner[x ]  Children[x ] Shon Yazdanism/Spirituality:  Substance hx:  [ ]   Tobacco hx:  [ ]   Alcohol hx: [ ]   Home Opioid hx:  [ ] I-Stop Reference No:  Living Situation: [x ]Home  [ ]Long term care  [ ]Rehab [ ]Other    ADVANCE DIRECTIVES:    DNR/MOLST  [ ]  Living Will  [ ]   DECISION MAKER(s):  [x ] Health Care Proxy(s)  [ ] Surrogate(s)  [ ] Guardian           Name(s): Phone Number(s): Pérez Menendez [phone]    BASELINE (I)ADL(s) (prior to admission):  Jay: [ ]Total  [x ] Moderate [ ]Dependent    Allergies    No Known Allergies    Intolerances    MEDICATIONS  (STANDING):  heparin  Infusion 650 Unit(s)/Hr (12 mL/Hr) IV Continuous <Continuous>  pantoprazole  Injectable 40 milliGRAM(s) IV Push two times a day  piperacillin/tazobactam IVPB.. 3.375 Gram(s) IV Intermittent every 8 hours    MEDICATIONS  (PRN):      ITEMS NOT CHECKED ARE NOT PRESENT  PRESENT SYMPTOMS: [ x]Unable to self-report see CPOT, PAINADs, RDOS  Source if other than patient:  [ ]Family   [ ]Team     Pain: [ ]yes [ ]no  QOL impact -   Location -                    Aggravating factors -  Quality -  Radiation -  Timing-  Severity (0-10 scale):  Minimal acceptable level (0-10 scale):       Dyspnea:                           [ ]Mild [ ]Moderate [ ]Severe  Anxiety:                             [ ]Mild [ ]Moderate [ ]Severe  Fatigue:                             [ ]Mild [ ]Moderate [ ]Severe  Nausea:                             [ ]Mild [ ]Moderate [ ]Severe  Loss of appetite:              [ ]Mild [ ]Moderate [ ]Severe  Constipation:                    [ ]Mild [ ]Moderate [ ]Severe    PCSSQ [Palliative Care Spiritual Screening Question]   Severity (0-10):  Score of 4 or > indicate consideration of Chaplaincy referral.  Chaplaincy Referral: [ ] yes [ ] refused [ ] following [x ] deferred    Caregiver [place]? : [ ] yes [ ] no [ x] deferred:  Social work referral [ ] Patient & Family Centered Care Referral [ ]     Anticipatory Grief Present?: x[ ] yes [ ] no  [ ] deferred: Palliative Social work referral [ ]  Patient & Family Centered Care Referral [ ]       Other Symptoms:  [ ]All other review of systems negative   [x ] Unable to obtain due to poor mentation    PHYSICAL EXAM:  Vital Signs Last 24 Hrs  T(C): 36.9 (14 Nov 2024 07:00), Max: 37.1 (14 Nov 2024 00:00)  T(F): 98.4 (14 Nov 2024 07:00), Max: 98.8 (14 Nov 2024 00:00)  HR: 79 (14 Nov 2024 08:15) (61 - 97)  BP: 96/50 (14 Nov 2024 08:00) (81/52 - 99/57)  BP(mean): 70 (14 Nov 2024 08:00) (61 - 75)  RR: 22 (14 Nov 2024 08:00) (22 - 38)  SpO2: 94% (14 Nov 2024 08:15) (86% - 100%)    Parameters below as of 14 Nov 2024 08:00  Patient On (Oxygen Delivery Method): BiPAP/CPAP    O2 Concentration (%): 65 I&O's Summary    13 Nov 2024 07:01  -  14 Nov 2024 07:00  --------------------------------------------------------  IN: 1268 mL / OUT: 0 mL / NET: 1268 mL    14 Nov 2024 07:01  -  14 Nov 2024 10:49  --------------------------------------------------------  IN: 74 mL / OUT: 200 mL / NET: -126 mL        GENERAL:  [x]Alert  [x]Oriented   [x ]Lethargic  [ ]Cachexia  [ ]Unarousable  []Verbal  [ x]Non-Verbal  Behavioral:   [ ]Anxiety  [ ]Delirium [ ]Agitation [ x]Other: calm   HEENT:  []Normal   [ ]Dry mouth   [ ]ET Tube/Trach  [ ]Oral lesions  PULMONARY:   []Clear [x ]Tachypnea  [ ]Audible excessive secretions   [ ]Rhonchi        [ ]Right [ ]Left [ ]Bilateral  [ ]Crackles        [ ]Right [ ]Left [ ]Bilateral  [ ]Wheezing     [ ]Right [ ]Left [ ]Bilateral  [ ]Diminished BS [ ] Right [ ]Left [ ]Bilateral  CARDIOVASCULAR:    [x]Regular [ ]Irregular [ ]Tachy  [ ]Emery [ ]Murmur [ ]Other  GASTROINTESTINAL:  [x]Soft  [ ]Distended   []+BS  []Non tender [ ]Tender  [ ]PEG [ ]OGT/ NGT   Last BM:  11/13  GENITOURINARY:  [x]Normal x[ ]Incontinent   [ ]Oliguria/Anuria   [ ]Vilchis  MUSCULOSKELETAL:   [ ]Normal   [x]Weakness  [x ]Bed/Wheelchair bound [ ]Edema  NEUROLOGIC:   []No focal deficits  [ ] Cognitive impairment  [ ] Dysphagia [ ]Dysarthria [ ] Paresis [ ]Other   SKIN: suspected sacral DTI Please see RN documentation which I have reviewed.  []Normal  [ ]Rash   [ ]Pressure ulcer(s) [ ]y [ ]n present on admission    CRITICAL CARE:  [ ] Shock Present  [ ]Septic [ ]Cardiogenic [ ]Neurologic [ ]Hypovolemic  [ ]  Vasopressors [ ]  Inotropes   [x ]Respiratory failure present [ ]Mechanical ventilation [x ]Non-invasive ventilatory support [ ]High flow    [ ]Acute  [ ]Chronic [ ]Hypoxic  [ ]Hypercarbic [ ]Other  [ x]Other organ failure     LABS:                        8.8    22.71 )-----------( 216      ( 12 Nov 2024 20:50 )             28.3   11-13    143  |  108  |  18  ----------------------------<  159[H]  3.2[L]   |  25  |  0.70    Ca    7.9[L]      13 Nov 2024 04:28  Phos  3.5     11-13  Mg     2.0     11-13    TPro  6.8  /  Alb  2.0[L]  /  TBili  0.7  /  DBili  x   /  AST  31  /  ALT  19  /  AlkPhos  233[H]  11-13  PT/INR - ( 13 Nov 2024 04:28 )   PT: 17.9 sec;   INR: 1.58 ratio         PTT - ( 13 Nov 2024 12:37 )  PTT:66.8 sec    Urinalysis Basic - ( 13 Nov 2024 04:28 )    Color: x / Appearance: x / SG: x / pH: x  Gluc: 159 mg/dL / Ketone: x  / Bili: x / Urobili: x   Blood: x / Protein: x / Nitrite: x   Leuk Esterase: x / RBC: x / WBC x   Sq Epi: x / Non Sq Epi: x / Bacteria: x      RADIOLOGY & ADDITIONAL STUDIES:< from: CT Abdomen and Pelvis w/ IV Cont (11.11.24 @ 16:37) >    ACC: [id number] EXAM:  CT ABDOMEN AND PELVIS IC   ORDERED BY: MATHEW TUCKER     ACC: [id number] EXAM:  CT ANGIO CHEST PULM ART WAWIC   ORDERED BY: MATHEW TUCKER     PROCEDURE DATE:  11/11/2024          INTERPRETATION:  CTA CHEST AND CT ABDOMEN AND PELVIS    INDICATION: Rule out pulmonary embolus or active bleed    TECHNIQUE: Helical images of the chest, abdomen, and pelvis were obtained   before and after the administration of 90 mL of Omnipaque 350. Maximum   intensity projection images were generated.    COMPARISON: None.    FINDINGS:    CT CHEST:    HEART/VASCULATURE: Sagittal embolus involving the right and left   pulmonary arteries. Enlarged heart. No pericardial effusion. Normal RV:   LV ratio. Right chest port catheter coursing in the superior vena cava.   No aortic aneurysm.    LUNGS/AIRWAYS/PLEURA: No endobronchial lesion. Narrowed AP diameter of   the bronchus intermedius which can be seen with bronchial malacia.   Symmetric upper lobe predominant perihilar groundglass and consolidation.   Symmetric moderate pleural effusions.    LYMPH NODES/MEDIASTINUM: No lymphadenopathy.    BONES/SOFT TISSUES: Anasarca. Lower thoracic and upper lumbar vertebral   plasty.        CT ABDOMEN/PELVIS:    LIVER: Large multilobulated fluid attenuationand peripheral enhancing   mass in the right hepatic lobe, approximately 9 cm in largest diameter.   Periportal edema.  BILIARY SYSTEM: Cholecystectomy.  SPLEEN:Unremarkable.  PANCREAS: Status post Whipple.  ADRENALS: Unremarkable.  KIDNEYS/URINARY TRACT: 2 small to characterize hypodensity in the lower   pole of the right kidney. No hydronephrosis.  GASTROINTESTINAL TRACT: No bowel obstruction. Diffuse colonic and small   bowel wall thickening. The appendix is not seen. Patent gastrojejunostomy.  REPRODUCTIVE ORGANS: Unremarkable uterus. Small calcifications in both   ovaries.  LYMPH NODES/PERITONEUM: Diffuse moderate volume ascites.  VASCULATURE: 1 cm nodular filling defects with portal vein near its   confluence with the splenic vein (16-44). No aortic aneurysm.  BONES/SOFT TISSUES: Anasarca. Lumbar spine kyphoplasty.      IMPRESSION:  Saddle pulmonary embolus. No evidence of right heart strain.  Diffuse symmetric groundglass and consolidation which is nonspecific.   Differential includes edema, hemorrhage, and infection.  Multilobulated cystic liver mass. Differential diagnosis includes   metastasis or abscess.  Diffuse colonic and small bowel wall thickening compatible with   enterocolitis.  1 cm nodular filling defect in the portal vein, suggestive of thrombus,   either tumor thrombus or bland thrombus.  Ascites.  This was discussed with Dr. Carrasco at 5:29 PM on 11/11/2024.        CT Angio Chest PE Protocol w/ IV Cont (11.11.24 @ 16:36) >  IMPRESSION:    Saddle pulmonary embolus. No evidence of right heart strain.    Diffuse symmetric groundglass and consolidation which is nonspecific.   Differential includes edema, hemorrhage, and infection.    Multilobulated cystic liver mass. Differential diagnosis includes   metastasis or abscess.    Diffuse colonic and small bowel wall thickening compatible with   enterocolitis.    1 cm nodular filling defect in the portal vein, suggestive of thrombus,   either tumor thrombus or bland thrombus.    Ascites.    This was discussed with Dr. Carrasco at 5:29 PM on 11/11/2024.    < end of copied text >        PROTEIN CALORIE MALNUTRITION PRESENT: [ ]mild [ ]moderate [ ]severe [ ]underweight [ ]morbid obesity  https://www.andeal.org/vault/2440/web/files/ONC/Table_Clinical%20Characteristics%20to%20Document%20Malnutrition-White%20JV%20et%20al%202012.pdf    Height (cm): 152.4 (11-11-24 @ 21:00), 149.9 (11-11-24 @ 13:29), 149.9 (11-01-24 @ 10:52)  Weight (kg): 51.9 (11-11-24 @ 21:00), 49 (11-11-24 @ 13:29), 47.6 (11-01-24 @ 10:52)  BMI (kg/m2): 22.3 (11-11-24 @ 21:00), 21.8 (11-11-24 @ 13:29), 21.2 (11-01-24 @ 10:52)    [ x]PPSV2 < or = to 30% [ ]significant weight loss  [x ]poor nutritional intake  [ ]anasarca[ ]Artificial Nutrition      Other REFERRALS:  [ ]Hospice  [ ]Child Life  [ ]Social Work  [x ]Case management [ ]Holistic Therapy                               Care Coordination Assessment 201    COGNITIVE/LEARNING 201  Mental Status    Answers: Unable to assess    ADMISSION HISTORY 201  Admitted From    Answers: Acute Hospital    Functional Status Prior to Admission    Answers: Assistive equipment,  Answers: Assistive person    Services Present on Admission    Answers: DME; specify cane    Notes: Services on Admission, Contacts    Notes: CDPAP son and daughter in law for 35 hours a week through [facility]    FINANCIAL 201  Does patient have financial concerns for discharge?    Answers: None    LIVING ARRANGEMENTS/SUPPORT 201  Housing Environment    Answers: House    Living Arrangements    Answers: Lives with spouse, significant other,  Answers: Lives with family    Sources of support/caregivers    Answers: Spouse/Significant Other,  Answers: Children,  Answers: Other daughter in law and grandson    CAREGIVER CONTACT 201  Does the patient wish to identify a Caregiver?    Answers: Yes    Caregiver Contact    Answers: Caregiver Name: Shon,  Answers: Caregiver Contact Number: [phone]    EMERGENCY CONTACTS OUTSIDE HOME 201  Emergency Contact    Answers: Emergency Contact Name Idalia Solis,  Answers: Emergency Contact Phone # [phone],  Answers: Emergency Contact Relationship Grandson    Notes: Emergency Contact:    Notes: Jonathan Menendez [phone]    DISCHARGE PLANNING 201  Potential Discharge Plan and Services    Answers: Anticipated Needs Unclear at Present    SCREENING 201  Social Work Screen and Referral    Answers: Adjustment to Illness/Difficulty Coping    SUMMARY 201  Initial Clinical Summary    Notes: Social work reviewed patients chart via high-risk screening. Patient is  a 74year-old, Mandarin speaking female. As per H&P patient was transferred from  [facility] and admitted for a saddle PE. Patient has PMHX of anemia, HTN, and  pancreatic cancer.        Social work met patient, pérez Menendez, daughter in law Vinayak Solis and spouse  Thomas Solis at bedside to provide supportive intervention. Social work  introduced self and role and offered to use free interpretation services but  Shon stated he is fluent in English and would provide information. Shon  reported having clear communication with the medical team. Shon expressed they  are coping as expected. Patient is  with 2 adult children. Patient  resides in a house with her immediate family. Patient reports they have no  steps to enter. Prior to hospitalization patient had a DME of a cane/ Vik  reports himself and his wife were the CDPAP for the patient through [facility] [employer]  for 35 hours a week. Social work explored if patient has a HCP. Shon reported  the patient does not have a HCP. Social work educated patient on the [facility] Healthcare Decision Act. Social work explained patient's ,  Bina would be the surrogate. Shon verbalized understanding and stated the  best phone number for the family is his son, Anjana jarquin. Shon stated the  pateints grandson Idalia Solis [phone] is her emergency contact.      Patient is covered by MEDICARE D CARE. Patient denied any financial,  cultural, or spiritual needs at this time. Shon denied any substance use  concerns in the patient but did report he had noticed the patient is depressed  since her diagnosis and is not connected to any mental health services nor  takes medications. Patients PCP is Berhane Mackay [phone]. Social work contact  information provided. Social work will continue to collaborate with  interdisciplinary team.    Anticipated Discharge Plan and Services    Notes: Discharge plans remain unclear due to ongoing hospital course. Social  work will remain available.       Electronically signed by:  Leticia Ferrari  Electronically signed on:  2024-11-12  14:39

## 2024-11-14 NOTE — CONSULT NOTE ADULT - CONSULT REASON
PE
Panc ca
?Dark Stools
GaP team consulted for complex medical decision making in the setting of serious illness and symptoms management.

## 2024-11-14 NOTE — PROGRESS NOTE ADULT - SUBJECTIVE AND OBJECTIVE BOX
PATIENT:  KELLI SHELTON  [id number]    CHIEF COMPLAINT:  Patient is a 74y old  Female who presents with a chief complaint of saddle PE (13 Nov 2024 22:18)      INTERVAL HISTORY/OVERNIGHT EVENTS:  The patient was seen and examined at bedside.     Additional Review of Symptoms:     MEDICATIONS:  MEDICATIONS  (STANDING):  heparin  Infusion 650 Unit(s)/Hr (12 mL/Hr) IV Continuous <Continuous>  pantoprazole  Injectable 40 milliGRAM(s) IV Push two times a day  piperacillin/tazobactam IVPB.. 3.375 Gram(s) IV Intermittent every 8 hours    MEDICATIONS  (PRN):      ALLERGIES:  Allergies    No Known Allergies    Intolerances        OBJECTIVE:  ICU Vital Signs Last 24 Hrs  T(C): 36.9 (14 Nov 2024 07:00), Max: 37.1 (14 Nov 2024 00:00)  T(F): 98.4 (14 Nov 2024 07:00), Max: 98.8 (14 Nov 2024 00:00)  HR: 79 (14 Nov 2024 08:15) (61 - 97)  BP: 96/50 (14 Nov 2024 08:00) (81/52 - 99/57)  BP(mean): 70 (14 Nov 2024 08:00) (59 - 75)  ABP: --  ABP(mean): --  RR: 22 (14 Nov 2024 08:00) (22 - 38)  SpO2: 94% (14 Nov 2024 08:15) (86% - 100%)    O2 Parameters below as of 14 Nov 2024 08:00  Patient On (Oxygen Delivery Method): BiPAP/CPAP    O2 Concentration (%): 65        Adult Advanced Hemodynamics Last 24 Hrs  CVP(mm Hg): --  CVP(cm H2O): --  CO: --  CI: --  PA: --  PA(mean): --  PCWP: --  SVR: --  SVRI: --  PVR: --  PVRI: --  CAPILLARY BLOOD GLUCOSE        CAPILLARY BLOOD GLUCOSE        I&O's Summary    13 Nov 2024 07:01  -  14 Nov 2024 07:00  --------------------------------------------------------  IN: 1268 mL / OUT: 0 mL / NET: 1268 mL    14 Nov 2024 07:01  -  14 Nov 2024 09:15  --------------------------------------------------------  IN: 74 mL / OUT: 200 mL / NET: -126 mL      Daily     Daily     PHYSICAL EXAMINATION:  General: WN/WD NAD  HEENT: PERRLA, EOMI, moist mucous membranes  Neurology: A&Ox3, nonfocal, BARRIOS x 4  Respiratory: CTA B/L, normal respiratory effort, no wheezes, crackles, rales  CV: RRR, S1S2, no murmurs, rubs or gallops  Abdominal: Soft, NT, ND +BS, Last BM  Extremities: No edema, + peripheral pulses  Incisions:   Tubes:    LABS:  ABG - ( 13 Nov 2024 04:11 )  pH, Arterial: 7.45  pH, Blood: x     /  pCO2: 40    /  pO2: 230   / HCO3: 28    / Base Excess: 3.5   /  SaO2: 98.1                                    8.8    22.71 )-----------( 216      ( 12 Nov 2024 20:50 )             28.3     11-13    143  |  108  |  18  ----------------------------<  159[H]  3.2[L]   |  25  |  0.70    Ca    7.9[L]      13 Nov 2024 04:28  Phos  3.5     11-13  Mg     2.0     11-13    TPro  6.8  /  Alb  2.0[L]  /  TBili  0.7  /  DBili  x   /  AST  31  /  ALT  19  /  AlkPhos  233[H]  11-13    LIVER FUNCTIONS - ( 13 Nov 2024 04:28 )  Alb: 2.0 g/dL / Pro: 6.8 g/dL / ALK PHOS: 233 U/L / ALT: 19 U/L / AST: 31 U/L / GGT: x           PT/INR - ( 13 Nov 2024 04:28 )   PT: 17.9 sec;   INR: 1.58 ratio         PTT - ( 13 Nov 2024 12:37 )  PTT:66.8 sec        Urinalysis Basic - ( 13 Nov 2024 04:28 )    Color: x / Appearance: x / SG: x / pH: x  Gluc: 159 mg/dL / Ketone: x  / Bili: x / Urobili: x   Blood: x / Protein: x / Nitrite: x   Leuk Esterase: x / RBC: x / WBC x   Sq Epi: x / Non Sq Epi: x / Bacteria: x        TELEMETRY:     EKG:     IMAGING:       PATIENT:  KELLI SHELTON  [id number]    CHIEF COMPLAINT:  Patient is a 74y old  Female who presents with a chief complaint of saddle PE (13 Nov 2024 22:18)      INTERVAL HISTORY/OVERNIGHT EVENTS:  The patient was seen and examined at bedside.     Additional Review of Symptoms:     MEDICATIONS:  MEDICATIONS  (STANDING):  heparin  Infusion 650 Unit(s)/Hr (12 mL/Hr) IV Continuous <Continuous>  pantoprazole  Injectable 40 milliGRAM(s) IV Push two times a day  piperacillin/tazobactam IVPB.. 3.375 Gram(s) IV Intermittent every 8 hours    MEDICATIONS  (PRN):      ALLERGIES:  Allergies    No Known Allergies    Intolerances        OBJECTIVE:  ICU Vital Signs Last 24 Hrs  T(C): 36.9 (14 Nov 2024 07:00), Max: 37.1 (14 Nov 2024 00:00)  T(F): 98.4 (14 Nov 2024 07:00), Max: 98.8 (14 Nov 2024 00:00)  HR: 79 (14 Nov 2024 08:15) (61 - 97)  BP: 96/50 (14 Nov 2024 08:00) (81/52 - 99/57)  BP(mean): 70 (14 Nov 2024 08:00) (59 - 75)  ABP: --  ABP(mean): --  RR: 22 (14 Nov 2024 08:00) (22 - 38)  SpO2: 94% (14 Nov 2024 08:15) (86% - 100%)    O2 Parameters below as of 14 Nov 2024 08:00  Patient On (Oxygen Delivery Method): BiPAP/CPAP    O2 Concentration (%): 65        Adult Advanced Hemodynamics Last 24 Hrs  CVP(mm Hg): --  CVP(cm H2O): --  CO: --  CI: --  PA: --  PA(mean): --  PCWP: --  SVR: --  SVRI: --  PVR: --  PVRI: --  CAPILLARY BLOOD GLUCOSE    I&O's Summary    13 Nov 2024 07:01  -  14 Nov 2024 07:00  --------------------------------------------------------  IN: 1268 mL / OUT: 0 mL / NET: 1268 mL    14 Nov 2024 07:01  -  14 Nov 2024 09:15  --------------------------------------------------------  IN: 74 mL / OUT: 200 mL / NET: -126 mL      Daily     Daily     PHYSICAL EXAMINATION:  General: WN/WD NAD  HEENT: EOMI  Respiratory: equal chest rises b/l  CV: RRR,     LABS:  ABG - ( 13 Nov 2024 04:11 )  pH, Arterial: 7.45  pH, Blood: x     /  pCO2: 40    /  pO2: 230   / HCO3: 28    / Base Excess: 3.5   /  SaO2: 98.1                                    8.8    22.71 )-----------( 216      ( 12 Nov 2024 20:50 )             28.3     11-13    143  |  108  |  18  ----------------------------<  159[H]  3.2[L]   |  25  |  0.70    Ca    7.9[L]      13 Nov 2024 04:28  Phos  3.5     11-13  Mg     2.0     11-13    TPro  6.8  /  Alb  2.0[L]  /  TBili  0.7  /  DBili  x   /  AST  31  /  ALT  19  /  AlkPhos  233[H]  11-13    LIVER FUNCTIONS - ( 13 Nov 2024 04:28 )  Alb: 2.0 g/dL / Pro: 6.8 g/dL / ALK PHOS: 233 U/L / ALT: 19 U/L / AST: 31 U/L / GGT: x           PT/INR - ( 13 Nov 2024 04:28 )   PT: 17.9 sec;   INR: 1.58 ratio         PTT - ( 13 Nov 2024 12:37 )  PTT:66.8 sec             PATIENT:  KELLI SHELTON  [id number]    CHIEF COMPLAINT:  Patient is a 74y old  Female who presents with a chief complaint of saddle PE (13 Nov 2024 22:18)      INTERVAL HISTORY/OVERNIGHT EVENTS:  The patient was seen and examined at bedside.     Additional Review of Symptoms:     MEDICATIONS:  MEDICATIONS  (STANDING):  heparin  Infusion 650 Unit(s)/Hr (12 mL/Hr) IV Continuous <Continuous>  pantoprazole  Injectable 40 milliGRAM(s) IV Push two times a day  piperacillin/tazobactam IVPB.. 3.375 Gram(s) IV Intermittent every 8 hours    MEDICATIONS  (PRN):      ALLERGIES:  Allergies    No Known Allergies    Intolerances        OBJECTIVE:  ICU Vital Signs Last 24 Hrs  T(C): 36.9 (14 Nov 2024 07:00), Max: 37.1 (14 Nov 2024 00:00)  T(F): 98.4 (14 Nov 2024 07:00), Max: 98.8 (14 Nov 2024 00:00)  HR: 79 (14 Nov 2024 08:15) (61 - 97)  BP: 96/50 (14 Nov 2024 08:00) (81/52 - 99/57)  BP(mean): 70 (14 Nov 2024 08:00) (59 - 75)  ABP: --  ABP(mean): --  RR: 22 (14 Nov 2024 08:00) (22 - 38)  SpO2: 94% (14 Nov 2024 08:15) (86% - 100%)    O2 Parameters below as of 14 Nov 2024 08:00  Patient On (Oxygen Delivery Method): BiPAP/CPAP    O2 Concentration (%): 65        Adult Advanced Hemodynamics Last 24 Hrs  CVP(mm Hg): --  CVP(cm H2O): --  CO: --  CI: --  PA: --  PA(mean): --  PCWP: --  SVR: --  SVRI: --  PVR: --  PVRI: --  CAPILLARY BLOOD GLUCOSE    I&O's Summary    13 Nov 2024 07:01  -  14 Nov 2024 07:00  --------------------------------------------------------  IN: 1268 mL / OUT: 0 mL / NET: 1268 mL    14 Nov 2024 07:01  -  14 Nov 2024 09:15  --------------------------------------------------------  IN: 74 mL / OUT: 200 mL / NET: -126 mL      Daily     Daily     PHYSICAL EXAMINATION:  General: WN/WD NAD  HEENT: EOMI  Respiratory: equal chest rises b/l  CV: RRR, no murmurs     LABS:  ABG - ( 13 Nov 2024 04:11 )  pH, Arterial: 7.45  pH, Blood: x     /  pCO2: 40    /  pO2: 230   / HCO3: 28    / Base Excess: 3.5   /  SaO2: 98.1                                    8.8    22.71 )-----------( 216      ( 12 Nov 2024 20:50 )             28.3     11-13    143  |  108  |  18  ----------------------------<  159[H]  3.2[L]   |  25  |  0.70    Ca    7.9[L]      13 Nov 2024 04:28  Phos  3.5     11-13  Mg     2.0     11-13    TPro  6.8  /  Alb  2.0[L]  /  TBili  0.7  /  DBili  x   /  AST  31  /  ALT  19  /  AlkPhos  233[H]  11-13    LIVER FUNCTIONS - ( 13 Nov 2024 04:28 )  Alb: 2.0 g/dL / Pro: 6.8 g/dL / ALK PHOS: 233 U/L / ALT: 19 U/L / AST: 31 U/L / GGT: x           PT/INR - ( 13 Nov 2024 04:28 )   PT: 17.9 sec;   INR: 1.58 ratio         PTT - ( 13 Nov 2024 12:37 )  PTT:66.8 sec

## 2024-11-14 NOTE — CONSULT NOTE ADULT - TIME BILLING
Total Time Spent 50 minutes.    This includes chart review, patient assessment, discussion and collaboration with interdisciplinary team members, excluding ACP.    COUNSELING:  Face to face meeting to discuss Advanced Care Planning- Time Spent 20 minutes

## 2024-11-14 NOTE — PROGRESS NOTE ADULT - NS ATTEND AMEND GEN_ALL_CORE FT
Agree with above assessment and plan.   Pancreatic cancer with possible hepatic mets. On gem/abraxane now with saddle PE. Overall poor prognosis. Hospice appropriate. Discussed with family at bedside.

## 2024-11-14 NOTE — CHART NOTE - NSCHARTNOTEFT_GEN_A_CORE
CCU Transfer Note    Transfer from: CCU    Transfer to: Medicine    Accepting Physician: Dr. Lorenz   Team covering on floor: ACP/Resident team   Signout given to: Hospitalist and ACP    CCU COURSE:  73 y/o F (mandarin speaking) with PMHx of pancreatic cancer on chemotherapy, DVT on Eliquis 2.5mg BID, HTN, recent admission for symptomatic anemia requiring blood transfusion presenting with SOB as well as dark stools. As per patient and grandson at bedside, patient was recently discharged on Eliquis after finding DVT.  Patient was also admitted for symptomatic anemia requiring blood transfusions, however did not receive an EGD and was discharged for outpatient follow-up with Protonix.  Patient developed some shortness of breath and was also having intermittent dark and bloody stools. Pt transferred to [facility] CICU for PE found on CTA chest. Pt initially saturating well on 50/100 Hi-flow. Overnight changed to Bipap 50% because pt desaturated to 50% with positional changes in bed. Vascular consulted for PE and recommends to c/w heparin gtt, but surgical thrombectomy at this time as pt is not candidate.  Pt's TTE on 11/12 showed normal RV cavity and function w/ PA systolic pressure 29mmhg, mild-mod tricuspid regurgitation. CXR 11/11 showed b/l patchy opacities, greater in left lower lung. Blood cultures neg, labile WBC 15-22, stable hgb 8.8, cleared lactate 2.9, uptrending alk phos 233, stable trop, and uptrending BNP 3518. Pt has been HD stable, MAP>70. On 11/13, family made pt comfort care, DNR/DNI, however would like to c/w heparin gtt, abxs, pantoprazole, supplemental O2 (Bipap/Hi-sanjeev), and daily coag blood draws. 11/14 pt was uncomfortable with Bipap and states throat felt dry so switched to Hi-sanjeev 100% for comfort, saturating at 94%. Palliative and heme/onc consulted. Refer to palliative and heme/onc recs for pt's comfort care.    FOR FOLLOW UP:  []  DNR/DNI trial NIV HCP: Son (yuniel)   [] Continue with heparin drip and daily AM coag blood draws        Vital Signs Last 24 Hrs  T(C): 36.9 (14 Nov 2024 07:00), Max: 37.1 (14 Nov 2024 00:00)  T(F): 98.4 (14 Nov 2024 07:00), Max: 98.8 (14 Nov 2024 00:00)  HR: 78 (14 Nov 2024 15:19) (61 - 82)  BP: 103/51 (14 Nov 2024 11:05) (85/48 - 103/51)  BP(mean): 73 (14 Nov 2024 11:05) (62 - 75)  RR: 21 (14 Nov 2024 11:05) (21 - 36)  SpO2: 92% (14 Nov 2024 15:19) (86% - 98%)    Parameters below as of 14 Nov 2024 08:00  Patient On (Oxygen Delivery Method): BiPAP/CPAP    O2 Concentration (%): 65  MEDICATIONS  (STANDING):  heparin  Infusion 650 Unit(s)/Hr (12 mL/Hr) IV Continuous <Continuous>  pantoprazole  Injectable 40 milliGRAM(s) IV Push two times a day  piperacillin/tazobactam IVPB.. 3.375 Gram(s) IV Intermittent every 8 hours    MEDICATIONS  (PRN):                 Randi Wiggins D.O. CCU Transfer Note    Transfer from: CCU    Transfer to: Medicine    Accepting Physician: Dr. Lorenz   Team covering on floor: ACP/Resident team   Signout given to: Hospitalist and ACP (Angelica Garnett)    CCU COURSE:  75 y/o F (mandarin speaking) with PMHx of pancreatic cancer on chemotherapy, DVT on Eliquis 2.5mg BID, HTN, recent admission for symptomatic anemia requiring blood transfusion presenting with SOB as well as dark stools. As per patient and grandson at bedside, patient was recently discharged on Eliquis after finding DVT.  Patient was also admitted for symptomatic anemia requiring blood transfusions, however did not receive an EGD and was discharged for outpatient follow-up with Proton.  Patient developed some shortness of breath and was also having intermittent dark and bloody stools. Pt transferred to [facility] CICU for PE found on CTA chest. Pt initially saturating well on 50/100 Hi-flow. Overnight changed to Bipap 50% because pt desaturated to 50% with positional changes in bed. Vascular consulted for PE and recommends to c/w heparin gtt, but surgical thrombectomy at this time as pt is not candidate.  Pt's TTE on 11/12 showed normal RV cavity and function w/ PA systolic pressure 29mmhg, mild-mod tricuspid regurgitation. CXR 11/11 showed b/l patchy opacities, greater in left lower lung. Blood cultures neg, labile WBC 15-22, stable hgb 8.8, cleared lactate 2.9, uptrending alk phos 233, stable trop, and uptrending BNP 3518. Pt has been HD stable, MAP>70. On 11/13, family made pt comfort care, DNR/DNI, however would like to c/w heparin gtt, abxs, pantoprazole, supplemental O2 (Bipap/Hi-sanjeev), and daily coag blood draws. 11/14 pt was uncomfortable with Bipap and states throat felt dry so switched to Hi-sanjeev 100% for comfort, saturating at 94%. Pt later in the evening on 11/14 desaturated to 74%, put pt back on Bipap. Palliative and heme/onc consulted. Refer to palliative and heme/onc recs for pt's comfort care.    FOR FOLLOW UP:  []  DNR/DNI trial NIV HCP: Son (yuniel)   [] Continue with heparin drip and daily AM coag blood draws  [] refer to palliative recs if family requests pain meds for pt        Vital Signs Last 24 Hrs  T(C): 36.9 (14 Nov 2024 07:00), Max: 37.1 (14 Nov 2024 00:00)  T(F): 98.4 (14 Nov 2024 07:00), Max: 98.8 (14 Nov 2024 00:00)  HR: 78 (14 Nov 2024 15:19) (61 - 82)  BP: 103/51 (14 Nov 2024 11:05) (85/48 - 103/51)  BP(mean): 73 (14 Nov 2024 11:05) (62 - 75)  RR: 21 (14 Nov 2024 11:05) (21 - 36)  SpO2: 92% (14 Nov 2024 15:19) (86% - 98%)    Parameters below as of 14 Nov 2024 08:00  Patient On (Oxygen Delivery Method): BiPAP/CPAP    O2 Concentration (%): 65  MEDICATIONS  (STANDING):  heparin  Infusion 650 Unit(s)/Hr (12 mL/Hr) IV Continuous <Continuous>  pantoprazole  Injectable 40 milliGRAM(s) IV Push two times a day  piperacillin/tazobactam IVPB.. 3.375 Gram(s) IV Intermittent every 8 hours    MEDICATIONS  (PRN):                 Randi Wiggins D.O.

## 2024-11-14 NOTE — PROGRESS NOTE ADULT - ASSESSMENT
75 y/o F with PMH of pancreatic cancer on chemotherapy, DVT on Eliquis 2.5mg BID, HTN, recent admission for symptomatic anemia requiring blood transfusion presenting with SOB as well as dark stools found to have a PE.     Pancreatic Cancer   - on gem/abraxane (LD on 10/18/24)  - follows Dr. Kalpesh PLASENCIA   - no plans for inpatient treatment     Severe anemia, dark stool  - Recently at [facility], received pRBC transfusions and was planned for outpatient EGD  - Received Aranesp 200 mcg LD 11/1.   - GI following, Outpatient EGD and colonoscopy once more medically stable and optimized   - Iron studies were low 11/1: iron 8, TIBC 162, % sat 5, ferritin 396; consider iron supplementation  - Recommend transfusion for hgb < 7    B/l DVT, new PE  - DVT diagnosed on 10/28  - started on eliquis which was held then dose reduced to 2.5 mg on recent admission   - CTA CAP: Saddle pulmonary embolus. No evidence of right heart strain. Diffuse symmetric groundglass and consolidation which is nonspecific. Multilobulated cystic liver mass. Differential diagnosis includes metastasis or abscess. Diffuse colonic and small bowel wall thickening compatible with enterocolitis. 1 cm nodular filling defect in the portal vein, suggestive of thrombus, either tumor thrombus or bland thrombus.  - Vascular following, no mechanical thrombectomy - not a candidate for advanced therapies given underlying co-morbidities, anemia and frailty  - Per GI, Benefits of A/C with massive PE outweighs risks as she has no active GI bleeding.   - c/o heparin gtt and  BiPAP    - Pt now DNR/DNI. Palliative care team following, transfer to PCU offered    will continue to follow.     Violetta Barajas NP  Hematology/ Oncology  [facility]  [phone] (office)  [phone] (alt office)  Evenings and weekends please call MD on call or office

## 2024-11-14 NOTE — CONSULT NOTE ADULT - PROBLEM SELECTOR RECOMMENDATION 3
HCP: son Shon (pending to bring form), spouse deferred decisions to his son and grandson   Code status: DNR/I trial NIV    Family would like to continue current level of care, not interested in PCU transfer or symptoms management at this moment.

## 2024-11-15 NOTE — PROGRESS NOTE ADULT - PROBLEM SELECTOR PLAN 1
- DVT diagnosed on 10/28 and patient was started on Eliquis which was held then dose reduced to 2.5 mg on recent admission   - CTA CAP: Saddle pulmonary embolus. No evidence of right heart strain. Diffuse symmetric groundglass and consolidation which is nonspecific. Multilobulated cystic liver mass. Differential diagnosis includes metastasis or abscess. Diffuse colonic and small bowel wall thickening compatible with enterocolitis. 1 cm nodular filling defect in the portal vein, suggestive of thrombus, either tumor thrombus or bland thrombus.  - Vascular following, no mechanical thrombectomy - not a candidate for advanced therapies given underlying co-morbidities, anemia and frailty  - Per GI, Benefits of A/C with massive PE outweighs risks as she has no active GI bleeding.   - Family would like to continue heparin drip without further blood draws -- however, as discussed with pt's son and grandson, daily PTT is required for continuation of Heparin gtt and Palliative Care Unit does not do daily blood draws as per palliative team-- Pt's son and grandson understand and still would like heparin gtt to be continued and they agree to daily AM labs  - Will appreciate discussion with vascular cardiology and family on changing to therapeutic Lovenox  - Monitor respiratory status as below

## 2024-11-15 NOTE — PROGRESS NOTE ADULT - PROBLEM SELECTOR PLAN 2
- Follows w/ Dr. Posey at [facility]   - Per inpatient heme-onc: no plans for inpatient treatment  - Appreciate palliative care's GOC discussion as documented: patient is DNR/DNI w/ trial BiPAP and on comfort care (though without withdrawal of active treatment including Heparin gtt as above)  - Refer to palliative recommendations if family requests pain meds for patient (and other symptomatic care)

## 2024-11-15 NOTE — PROGRESS NOTE ADULT - TIME BILLING
conducting history and physical examination, reviewing and ordering diagnostic workup as above, discussing with consultants, determining acute diagnoses / plan for continued monitoring and management, and communication with patient and caregivers.

## 2024-11-15 NOTE — PROGRESS NOTE ADULT - PROBLEM SELECTOR PLAN 3
- Continue on high flow 50/100 91%-92%  - Wean as tolerated  - GOC: DNR/DNI w/ trial BiPAP  - 11/11: CT abd/pelvis: Diffuse colonic and small bowel thickening indicative of colitis. Multilobulated cystic liver mass indicative of metastasis vs. abscess  - Blood cultures neg, MRSA swab neg, (+)MSSA, started Mupirocin  - Will continue IV Zosyn per GOC to complete 5-7day course: 11/11- ___

## 2024-11-15 NOTE — PROGRESS NOTE ADULT - SUBJECTIVE AND OBJECTIVE BOX
INCOMPLETE NOTE    Mamadou Weiner M.D.  Division of Hospital Medicine  Available on [employer] TEAMS    HISTORY OF PRESENTING ILLNESS: 75 y/o F with PMH of pancreatic cancer on chemotherapy, DVT on Eliquis 2.5mg BID, HTN, recent admission for symptomatic anemia requiring blood transfusion presenting with SOB as well as dark stools. As per patient and grandson at bedside, patient was recently discharged on Eliquis after finding DVT.  Patient was also admitted for symptomatic anemia requiring blood transfusions, however did not receive an EGD and was discharged for outpatient follow-up with Protonix.  Patient developed some shortness of breath and was also having intermittent dark and bloody stools.  Denies any chest pain. (11 Nov 2024 21:14)    PAST MEDICAL & SURGICAL HISTORY: Pancreatic cancer, HTN (hypertension), Anemia    ALLERGIES: No Known Allergies    HOME MEDICATIONS:  Akynzeo Injection 235 mg-0.25 mg/20 mL intravenous solution: 0.25 milligram(s) intravenously every 28 days day 1, day 8, day 15 (01 Nov 2024 19:03)  Aranesp 200 mcg/0.4 mL injectable solution: 200 microgram(s) subcutaneously every 2 weeks (01 Nov 2024 19:03)  Aranesp 300 mcg/0.6 mL injectable solution: 300 microgram(s) subcutaneously every 3 weeks (01 Nov 2024 19:03)  megestrol 40 mg/mL oral suspension: 5 milliliter(s) orally once a day (01 Nov 2024 19:03)  Neulasta 6 mg/0.6 mL subcutaneous solution: 6 milligram(s) subcutaneously apply on body injector after chemo for one dose (01 Nov 2024 19:04)  PACLitaxel protein-bound 100 mg intravenous injection: 121 milligram(s) intravenously every 28 days day 1, day 8, day 15 (01 Nov 2024 19:04)  pancrelipase 24,000 units-76,000 units-120,000 units oral delayed release capsule: 1 cap(s) orally every 8 hours (01 Nov 2024 19:03)  Retacrit 40,000 units/mL preservative-free injectable solution: 40,000 unit(s) subcutaneously once a week (01 Nov 2024 19:04)  Venofer 20 mg/mL intravenous solution: 100 milligram(s) intravenously (01 Nov 2024 19:03)    HOSPITAL COURSE:     SUBJECTIVE / ACUTE INTERVAL EVENTS:    Review of Systems  · General Symptoms: no fever; no chills  · Skin Symptoms: no rash; no itching  · Ophthalmologic Symptoms: no scleral injection L; no scleral injection R  · ENMT Symptoms: no throat pain; no dysphagia; no nasal congestion  · Respiratory and Thorax Symptoms: no cough; no wheezing; no pleuritic chest pain  · Cardiovascular Symptoms: no chest pain; no palpitations; no peripheral edema  · Gastrointestinal Symptoms: no nausea; no vomiting; no diarrhea; no constipation; no change in bowel habits; no abdominal pain  · General Genitourinary Symptoms: no flank pain L; no flank pain R; no dysuria  · Musculoskeletal Symptoms: no joint swelling; no neck pain; no back pain  · Neurological: negative  · Psychiatric: negative  · Hematology/Lymphatics: negative  · Endocrine: negative    OBJECTIVE:   Vital Signs Last 24 Hrs  T(F): 97.9 (15 Nov 2024 04:00), Max: 98.5 (14 Nov 2024 12:00)  HR: 75 (15 Nov 2024 09:36) (56 - 89)  BP: 130/71 (15 Nov 2024 04:00) (102/54 - 132/60)  RR: 19 (15 Nov 2024 04:00) (19 - 25)  SpO2: 93% (15 Nov 2024 09:36) (90% - 98%)  Parameters below as of 15 Nov 2024 04:00  Patient On (Oxygen Delivery Method): BiPAP/CPAP    I&O's Summary  14 Nov 2024 07:01  -  15 Nov 2024 07:00  --------------------------------------------------------  IN: 283 mL / OUT: 275 mL / NET: 8 mL    Physical Examination:  GEN: thin man, laying in stretcher in NAD  PSYCH: A&Ox3, mood and affect appear appropriate   SKIN: intact, no e/o rash  NEURO: no focal neurologic deficits appreciated; CN II-XII intact, sensation intact B/L, motor 5/5 in all mm groups  EYES: PERRL, anicteric, EOMI, no vertical/horizontal nystagmus  HEAD: NC, AT  NECK: supple  RESPI: no accessory muscle use, B/L air entry, CTAB   CARDIO: regular rate/rhythm, no LE edema B/L  ABD: soft, NT, ND, +BS  EXT: patient able to move all extremities spontaneously  VASC: peripheral pulses palpated    Labs:                     7.6    12.32 )-----------( 181      ( 15 Nov 2024 06:28 )             25.8     11-15  144  |  107  |  33[H]  ----------------------------<  102[H]  3.3[L]   |  25  |  0.80    Ca    8.0[L]      15 Nov 2024 06:28    PT/INR - ( 15 Nov 2024 06:28 )   PT: 14.8 sec;   INR: 1.30 ratio    PTT - ( 15 Nov 2024 06:28 )  PTT:57.3 sec    Urinalysis Basic - ( 15 Nov 2024 06:28 )  Color: x / Appearance: x / SG: x / pH: x  Gluc: 102 mg/dL / Ketone: x  / Bili: x / Urobili: x   Blood: x / Protein: x / Nitrite: x   Leuk Esterase: x / RBC: x / WBC x   Sq Epi: x / Non Sq Epi: x / Bacteria: x    Consultant(s) Notes Reviewed: Heme-Onc, Palliative Care, Vascular Cardiology, CICU  Care Discussed with Consultants/Other Providers: ACP Shayna Frease    CURRENT MEDICATIONS  (STANDING):  heparin  Infusion 1250 Unit(s)/Hr (12.5 mL/Hr) IV Continuous <Continuous>  pantoprazole  Injectable 40 milliGRAM(s) IV Push two times a day  piperacillin/tazobactam IVPB.. 3.375 Gram(s) IV Intermittent every 8 hours Mamadou Weiner M.D.  Division of Hospital Medicine  Available on [employer] TEAMS    HISTORY OF PRESENTING ILLNESS: 73 y/o F with PMH of pancreatic cancer on chemotherapy, DVT on Eliquis 2.5mg BID, HTN, recent admission for symptomatic anemia requiring blood transfusion presenting with SOB as well as dark stools. As per patient and grandson at bedside, patient was recently discharged on Eliquis after finding DVT.  Patient was also admitted for symptomatic anemia requiring blood transfusions, however did not receive an EGD and was discharged for outpatient follow-up with Protonix.  Patient developed some shortness of breath and was also having intermittent dark and bloody stools.  Denies any chest pain. (11 Nov 2024 21:14)    PAST MEDICAL & SURGICAL HISTORY: Pancreatic cancer, HTN (hypertension), Anemia    ALLERGIES: No Known Allergies    HOME MEDICATIONS:  Akynzeo Injection 235 mg-0.25 mg/20 mL intravenous solution: 0.25 milligram(s) intravenously every 28 days day 1, day 8, day 15 (01 Nov 2024 19:03)  Aranesp 200 mcg/0.4 mL injectable solution: 200 microgram(s) subcutaneously every 2 weeks (01 Nov 2024 19:03)  Aranesp 300 mcg/0.6 mL injectable solution: 300 microgram(s) subcutaneously every 3 weeks (01 Nov 2024 19:03)  megestrol 40 mg/mL oral suspension: 5 milliliter(s) orally once a day (01 Nov 2024 19:03)  Neulasta 6 mg/0.6 mL subcutaneous solution: 6 milligram(s) subcutaneously apply on body injector after chemo for one dose (01 Nov 2024 19:04)  PACLitaxel protein-bound 100 mg intravenous injection: 121 milligram(s) intravenously every 28 days day 1, day 8, day 15 (01 Nov 2024 19:04)  pancrelipase 24,000 units-76,000 units-120,000 units oral delayed release capsule: 1 cap(s) orally every 8 hours (01 Nov 2024 19:03)  Retacrit 40,000 units/mL preservative-free injectable solution: 40,000 unit(s) subcutaneously once a week (01 Nov 2024 19:04)  Venofer 20 mg/mL intravenous solution: 100 milligram(s) intravenously (01 Nov 2024 19:03)    HOSPITAL COURSE: Pt transferred to [facility] for PE found on CTA chest. Pt initially saturating well on 50/100 Hi-flow. Overnight changed to Bipap 50% because pt desaturated to 50% with positional changes in bed. Vascular consulted for PE and recommends to c/w heparin gtt, but surgical thrombectomy at this time as pt is not candidate.  Pt's TTE on 11/12 showed normal RV cavity and function w/ PA systolic pressure 29mmhg, mild-mod tricuspid regurgitation. CXR 11/11 showed b/l patchy opacities, greater in left lower lung. Blood cultures neg, labile WBC 15-22, stable hgb 8.8, cleared lactate 2.9, uptrending alk phos 233, stable trop, and uptrending BNP 3518. Pt has been HD stable, MAP>70. On 11/13, family made pt comfort care, DNR/DNI, however would like to c/w heparin gtt, abxs, pantoprazole, supplemental O2 (Bipap/Hi-sanjeev), and daily coag blood draws. 11/14 pt was uncomfortable with Bipap and states throat felt dry so switched to Hi-sanjeev 100% for comfort, saturating at 94%. Pt later in the evening on 11/14 desaturated to 74%, put pt back on Bipap. Palliative and heme/onc consulted. Refer to palliative and heme/onc recs for pt's comfort care.    SUBJECTIVE / ACUTE INTERVAL EVENTS: Patient seen and examined. No acute changes in symptoms. Appreciate palliative care's goals of care discussion with pt's family to maintain (and not withdraw) current level of treatment w/ IV Abx and IV Heparin and once daily blood draw for PTT, but otherwise patient is on comfort care; will continue to follow-up re: plan for initiation of analgesia and other symptom control, and disposition planning to ?inpatient hospice -- at this time, family is not ready to make these decisions as they want to continue with Heparin gtt.      OBJECTIVE:   Vital Signs Last 24 Hrs  T(F): 97.9 (15 Nov 2024 04:00), Max: 98.5 (14 Nov 2024 12:00)  HR: 75 (15 Nov 2024 09:36) (56 - 89)  BP: 130/71 (15 Nov 2024 04:00) (102/54 - 132/60)  RR: 19 (15 Nov 2024 04:00) (19 - 25)  SpO2: 93% (15 Nov 2024 09:36) (90% - 98%)  Parameters below as of 15 Nov 2024 04:00  Patient On (Oxygen Delivery Method): BiPAP/CPAP    I&O's Summary  14 Nov 2024 07:01  -  15 Nov 2024 07:00  --------------------------------------------------------  IN: 283 mL / OUT: 275 mL / NET: 8 mL    Physical Examination:  GEN: elderly woman, laying in bed in NAD  PSYCH: A&Ox3, mood and affect appear appropriate   NEURO: no focal neurologic deficits appreciated  RESPI: no accessory muscle use, B/L air entry   CARDIO: regular rate/rhythm, no LE edema B/L  ABD: soft, NT, ND  EXT: patient able to move all extremities spontaneously  VASC: peripheral pulses palpated    Labs:                     7.6    12.32 )-----------( 181      ( 15 Nov 2024 06:28 )             25.8     11-15  144  |  107  |  33[H]  ----------------------------<  102[H]  3.3[L]   |  25  |  0.80    Ca    8.0[L]      15 Nov 2024 06:28    PT/INR - ( 15 Nov 2024 06:28 )   PT: 14.8 sec;   INR: 1.30 ratio    PTT - ( 15 Nov 2024 06:28 )  PTT:57.3 sec    Urinalysis Basic - ( 15 Nov 2024 06:28)  Color: x / Appearance: x / SG: x / pH: x  Gluc: 102 mg/dL / Ketone: x  / Bili: x / Urobili: x   Blood: x / Protein: x / Nitrite: x   Leuk Esterase: x / RBC: x / WBC x   Sq Epi: x / Non Sq Epi: x / Bacteria: x    Consultant(s) Notes Reviewed: Heme-Onc, Palliative Care, Vascular Cardiology, CICU  Care Discussed with Consultants/Other Providers: ACP Shayna Frease    CURRENT MEDICATIONS  (STANDING):  heparin  Infusion 1250 Unit(s)/Hr (12.5 mL/Hr) IV Continuous <Continuous>  pantoprazole  Injectable 40 milliGRAM(s) IV Push two times a day  piperacillin/tazobactam IVPB.. 3.375 Gram(s) IV Intermittent every 8 hours

## 2024-11-16 LAB
CULTURE RESULTS: SIGNIFICANT CHANGE UP
CULTURE RESULTS: SIGNIFICANT CHANGE UP
SPECIMEN SOURCE: SIGNIFICANT CHANGE UP
SPECIMEN SOURCE: SIGNIFICANT CHANGE UP

## 2024-11-16 NOTE — PROGRESS NOTE ADULT - ASSESSMENT
73 y/o F with PMH of pancreatic cancer on chemotherapy, DVT on Eliquis 2.5mg BID, HTN, recent admission for symptomatic anemia requiring blood transfusion presenting with SOB found to have a saddle PE. Family wants pt to be on hep gtt with minimal other interventions

## 2024-11-16 NOTE — PROGRESS NOTE ADULT - TIME BILLING
conducting history and physical examination, reviewing and ordering diagnostic workup as above, discussing with consultants, determining acute diagnoses / plan for continued monitoring and management, and communication with patient and caregivers. Review of tests, imaging, labs, documents, medical management, coordination of care and counseling.

## 2024-11-16 NOTE — PROGRESS NOTE ADULT - SUBJECTIVE AND OBJECTIVE BOX
Patient is a 74y old  Female who presents with a chief complaint of saddle PE (15 Nov 2024 09:32)      SUBJECTIVE : NAEO. Pt seen and examined at bedside. No acute changes in symptoms. Seen with daughter at bedside. They are not ready to make transition to full comfort care and wants to keep her on hep gtt with once daily blood draw.    MEDICATIONS  (STANDING):  Biotene Dry Mouth Oral Rinse 5 milliLiter(s) Swish and Spit four times a day  heparin  Infusion 1250 Unit(s)/Hr (13 mL/Hr) IV Continuous <Continuous>  pantoprazole  Injectable 40 milliGRAM(s) IV Push two times a day  piperacillin/tazobactam IVPB.. 3.375 Gram(s) IV Intermittent every 8 hours    MEDICATIONS  (PRN):  benzocaine 20% Spray 1 Spray(s) Topical three times a day PRN sore throat      CAPILLARY BLOOD GLUCOSE        I&O's Summary    15 Nov 2024 07:01  -  16 Nov 2024 07:00  --------------------------------------------------------  IN: 0 mL / OUT: 100 mL / NET: -100 mL    16 Nov 2024 07:01  -  16 Nov 2024 16:22  --------------------------------------------------------  IN: 0 mL / OUT: 0 mL / NET: 0 mL        PHYSICAL EXAM:  Vital Signs Last 24 Hrs  T(C): 36.8 (16 Nov 2024 11:05), Max: 37.7 (16 Nov 2024 04:00)  T(F): 98.2 (16 Nov 2024 11:05), Max: 99.8 (16 Nov 2024 04:00)  HR: 72 (16 Nov 2024 11:05) (66 - 76)  BP: 113/70 (16 Nov 2024 11:05) (102/63 - 115/64)  BP(mean): --  RR: 18 (16 Nov 2024 11:05) (17 - 18)  SpO2: 98% (16 Nov 2024 11:05) (93% - 98%)    Parameters below as of 16 Nov 2024 11:05  Patient On (Oxygen Delivery Method): nasal cannula w/ humidification        CONSTITUTIONAL: cachetic, chronically ill appearing, NAD   EYES:  conjunctiva and sclera clear  NECK: Supple, no palpable masses; no JVD  RESPIRATORY: Normal respiratory effort; lungs are clear to auscultation bilaterally  CARDIOVASCULAR: Regular rate and rhythm, normal S1 and S2, no murmur/rub/gallop; No lower extremity edema  ABDOMEN: Nontender to palpation, normoactive bowel sounds, no rebound/guarding  MUSCULOSKELETAL:  no clubbing or cyanosis of digits; no joint swelling or tenderness to palpation  PSYCH: A+O to person, place, and time; affect appropriate  SKIN: No rashes; no palpable lesions    LABS:                        9.6    6.78  )-----------( 153      ( 16 Nov 2024 07:53 )             32.7     11-16    145  |  108  |  36[H]  ----------------------------<  120[H]  3.6   |  25  |  0.66    Ca    7.8[L]      16 Nov 2024 07:53  Mg     2.3     11-16      PT/INR - ( 15 Nov 2024 06:28 )   PT: 14.8 sec;   INR: 1.30 ratio         PTT - ( 16 Nov 2024 07:53 )  PTT:49.8 sec      Urinalysis Basic - ( 16 Nov 2024 07:53 )    Color: x / Appearance: x / SG: x / pH: x  Gluc: 120 mg/dL / Ketone: x  / Bili: x / Urobili: x   Blood: x / Protein: x / Nitrite: x   Leuk Esterase: x / RBC: x / WBC x   Sq Epi: x / Non Sq Epi: x / Bacteria: x          RADIOLOGY & ADDITIONAL TESTS:  Results Reviewed:   Imaging Personally Reviewed:  Electrocardiogram Personally Reviewed:    COORDINATION OF CARE:  Care Discussed with Consultants/Other Providers [Y/N]:  Prior or Outpatient Records Reviewed [Y/N]:

## 2024-11-16 NOTE — PROGRESS NOTE ADULT - PROBLEM SELECTOR PLAN 4
DVT ppx: hep gtt  Dispo: pending further GOC discussions, good candidate for inpatient hospice DVT ppx: hep gtt  Dispo: pending further GOC discussions, hospice seems appropriate

## 2024-11-16 NOTE — PROGRESS NOTE ADULT - PROBLEM SELECTOR PLAN 3
- Continue on high flow 50/100 91%-92%  - Wean as tolerated  - GOC: DNR/DNI w/ trial BiPAP  - 11/11: CT abd/pelvis: Diffuse colonic and small bowel thickening indicative of colitis. Multilobulated cystic liver mass indicative of metastasis vs. abscess  - Blood cultures neg, MRSA swab neg, (+)MSSA, started Mupirocin  - Will continue IV Zosyn per GOC to complete 7day course: 11/11- 11/18

## 2024-11-16 NOTE — PROGRESS NOTE ADULT - PROBLEM SELECTOR PLAN 2
- Follows w/ Dr. Posey at [facility]   - Per inpatient heme-onc: no plans for inpatient treatment  - Appreciate palliative care's GOC discussion as documented: patient is DNR/DNI w/ trial BiPAP and on comfort care (though without withdrawal of active treatment including Heparin gtt as above)  - Refer to palliative recommendations if family requests pain meds for patient (and other symptomatic care)

## 2024-11-17 NOTE — PROGRESS NOTE ADULT - PROBLEM SELECTOR PLAN 3
- Continue on high flow 50/100 91%-92%--. can decrease the FIO2 as warranted   - Wean as tolerated  - GOC: DNR/DNI w/ trial BiPAP  - 11/11: CT abd/pelvis: Diffuse colonic and small bowel thickening indicative of colitis. Multilobulated cystic liver mass indicative of metastasis vs. abscess  - Blood cultures neg, MRSA swab neg, (+)MSSA, started Mupirocin  - Will continue IV Zosyn per GOC to complete 7day course: 11/11- 11/18

## 2024-11-17 NOTE — PROGRESS NOTE ADULT - PROBLEM SELECTOR PROBLEM 1
EXAM DESCRIPTION:



 Bilateral tibia fibula, 8/10/2019, 4:45 AM



CLINICAL HISTORY: 



50 years  Male  fell off horse, deformity noted mid tib/fib



COMPARISON: 



None



TECHNIQUE: 



2 views of each tibia and fibula



FINDINGS: 



Negative for fracture or dislocation. Soft tissue swelling of the

left leg.. Joint spaces are preserved bilaterally.



IMPRESSION: 



Negative for acute osseous abnormality. Pulmonary thromboembolism

## 2024-11-17 NOTE — PROGRESS NOTE ADULT - PROBLEM SELECTOR PLAN 1
- DVT diagnosed on 10/28 and patient was started on Eliquis which was held then dose reduced to 2.5 mg on recent admission   - CTA CAP: Saddle pulmonary embolus. No evidence of right heart strain. Diffuse symmetric groundglass and consolidation which is nonspecific. Multilobulated cystic liver mass. Differential diagnosis includes metastasis or abscess. Diffuse colonic and small bowel wall thickening compatible with enterocolitis. 1 cm nodular filling defect in the portal vein, suggestive of thrombus, either tumor thrombus or bland thrombus.  - Vascular following, no mechanical thrombectomy - not a candidate for advanced therapies given underlying co-morbidities, anemia and frailty  - Per GI, Benefits of A/C with massive PE outweighs risks as she has no active GI bleeding.   - Family would like to continue heparin drip  --> stable PTT   - can  discuss  with vascular cardiology and family on changing to therapeutic Lovenox  - Monitor respiratory status as below

## 2024-11-17 NOTE — PROGRESS NOTE ADULT - SUBJECTIVE AND OBJECTIVE BOX
Patient is a 74y old  Female who presents with a chief complaint of saddle PE (16 Nov 2024 16:21)      SUBJECTIVE / OVERNIGHT EVENTS:  No overnight events         MEDICATIONS  (STANDING):  Biotene Dry Mouth Oral Rinse 5 milliLiter(s) Swish and Spit four times a day  heparin  Infusion 1200 Unit(s)/Hr (12 mL/Hr) IV Continuous <Continuous>  pantoprazole  Injectable 40 milliGRAM(s) IV Push two times a day  piperacillin/tazobactam IVPB.. 3.375 Gram(s) IV Intermittent every 8 hours    MEDICATIONS  (PRN):  benzocaine 20% Spray 1 Spray(s) Topical three times a day PRN sore throat      CAPILLARY BLOOD GLUCOSE        I&O's Summary    16 Nov 2024 07:01  -  17 Nov 2024 07:00  --------------------------------------------------------  IN: 0 mL / OUT: 400 mL / NET: -400 mL        PHYSICAL EXAM:  Vital Signs Last 24 Hrs  T(C): 37 (17 Nov 2024 11:06), Max: 37.1 (17 Nov 2024 04:15)  T(F): 98.6 (17 Nov 2024 11:06), Max: 98.8 (17 Nov 2024 04:15)  HR: 76 (17 Nov 2024 11:06) (68 - 76)  BP: 134/74 (17 Nov 2024 11:06) (116/70 - 134/74)  BP(mean): --  RR: 18 (17 Nov 2024 11:06) (18 - 18)  SpO2: 100% (17 Nov 2024 11:06) (95% - 100%)    Parameters below as of 17 Nov 2024 11:06  Patient On (Oxygen Delivery Method): nasal cannula, high flow        PHYSICAL EXAM:  GENERAL: NAD, well-developed  HEAD:  Atraumatic, Normocephalic  EYES:  conjunctiva and sclera clear  NECK: Supple, No JVD  CHEST/LUNG: Clear to auscultation bilaterally; No wheeze  HEART: Regular rate and rhythm; No murmurs, rubs, or gallops  ABDOMEN: Soft, Nontender, Nondistended; Bowel sounds present        LABS:                        9.6    6.78  )-----------( 153      ( 16 Nov 2024 07:53 )             32.7     11-16    145  |  108  |  36[H]  ----------------------------<  120[H]  3.6   |  25  |  0.66    Ca    7.8[L]      16 Nov 2024 07:53  Mg     2.3     11-16      PT/INR - ( 17 Nov 2024 06:56 )   PT: 14.4 sec;   INR: 1.25 ratio         PTT - ( 17 Nov 2024 06:56 )  PTT:91.2 sec      Urinalysis Basic - ( 16 Nov 2024 07:53 )    Color: x / Appearance: x / SG: x / pH: x  Gluc: 120 mg/dL / Ketone: x  / Bili: x / Urobili: x   Blood: x / Protein: x / Nitrite: x   Leuk Esterase: x / RBC: x / WBC x   Sq Epi: x / Non Sq Epi: x / Bacteria: x          RADIOLOGY & ADDITIONAL TESTS:    Imaging Personally Reviewed:    Electrocardiogram Personally Reviewed:    COORDINATION OF CARE:  Care Discussed with Consultants/Other Providers [Y/N]:  Prior or Outpatient Records Reviewed [Y/N]:

## 2024-11-17 NOTE — PROGRESS NOTE ADULT - ASSESSMENT
75 y/o F with PMH of pancreatic cancer on chemotherapy, DVT on Eliquis 2.5mg BID, HTN, recent admission for symptomatic anemia requiring blood transfusion presenting with SOB found to have a saddle PE. Family wants pt to be on hep gtt with minimal other interventions

## 2024-11-17 NOTE — PROGRESS NOTE ADULT - PROBLEM SELECTOR PLAN 2
- Follows w/ Dr. Posye at [facility]   - Per inpatient heme-onc: no plans for inpatient treatment  - Appreciate palliative care's GOC discussion as documented: patient is DNR/DNI w/ trial BiPAP and on comfort care (though without withdrawal of active treatment including Heparin gtt as above)  - Refer to palliative recommendations if family requests pain meds for patient (and other symptomatic care)

## 2024-11-18 NOTE — PROGRESS NOTE ADULT - PROBLEM SELECTOR PLAN 1
- DVT diagnosed on 10/28 and patient was started on Eliquis which was held then dose reduced to 2.5 mg on recent admission   - CTA CAP: Saddle pulmonary embolus. No evidence of right heart strain. Diffuse symmetric groundglass and consolidation which is nonspecific. Multilobulated cystic liver mass. Differential diagnosis includes metastasis or abscess. Diffuse colonic and small bowel wall thickening compatible with enterocolitis. 1 cm nodular filling defect in the portal vein, suggestive of thrombus, either tumor thrombus or bland thrombus.  - Vascular following, no mechanical thrombectomy - not a candidate for advanced therapies given underlying co-morbidities, anemia and frailty  - Per GI, Benefits of A/C with massive PE outweighs risks as she has no active GI bleeding.   - Family would like to continue heparin drip , will transition to lovenox  - Monitor respiratory status as below

## 2024-11-18 NOTE — PROGRESS NOTE ADULT - NS ATTEND AMEND GEN_ALL_CORE FT
Continues on heparin gtt, no overt bleeding  Planned to transition to lovenox today  Completing antibiotics today as well Continues on heparin gtt, no overt bleeding  Planned to transition to lovenox today  Remains on antibiotics

## 2024-11-18 NOTE — PROGRESS NOTE ADULT - SUBJECTIVE AND OBJECTIVE BOX
Patient is a 74y old  Female who presents with a chief complaint of saddle PE (17 Nov 2024 12:38)    Patient seen and examined  Appears comfortable, family at bedside    MEDICATIONS  (STANDING):  Biotene Dry Mouth Oral Rinse 5 milliLiter(s) Swish and Spit four times a day  heparin  Infusion 1200 Unit(s)/Hr (11 mL/Hr) IV Continuous <Continuous>  pantoprazole  Injectable 40 milliGRAM(s) IV Push two times a day  piperacillin/tazobactam IVPB.. 3.375 Gram(s) IV Intermittent every 8 hours    MEDICATIONS  (PRN):  benzocaine 20% Spray 1 Spray(s) Topical three times a day PRN sore throat      Vital Signs Last 24 Hrs  T(C): 37.3 (18 Nov 2024 04:02), Max: 37.3 (18 Nov 2024 04:02)  T(F): 99.1 (18 Nov 2024 04:02), Max: 99.1 (18 Nov 2024 04:02)  HR: 75 (18 Nov 2024 08:50) (70 - 78)  BP: 130/65 (18 Nov 2024 04:02) (104/63 - 130/65)  BP(mean): --  RR: 18 (18 Nov 2024 08:50) (18 - 18)  SpO2: 98% (18 Nov 2024 08:50) (94% - 98%)    Parameters below as of 18 Nov 2024 08:50  Patient On (Oxygen Delivery Method): nasal cannula, high flow  O2 Flow (L/min): 50  O2 Concentration (%): 80    PE  frail  lethargic   Anicteric  RRR  HFNC  Abd soft, NT, ND  No c/c                        7.5    6.18  )-----------( 106      ( 18 Nov 2024 05:13 )             25.5       11-18    146[H]  |  112[H]  |  32[H]  ----------------------------<  115[H]  5.2   |  26  |  0.51    Ca    8.1[L]      18 Nov 2024 05:14

## 2024-11-18 NOTE — CHART NOTE - NSCHARTNOTEFT_GEN_A_CORE
NUTRITION FOLLOW UP NOTE    PATIENT SEEN FOR: Malnutrition follow up    SOURCE: [] Patient  [x] Current Medical Record  [] RN  [] Family/support person at bedside  [x] Patient unavailable/inappropriate  [] Other:    CHART REVIEWED/EVENTS NOTED.  [] No changes to nutrition care plan to note  [x] Nutrition Status:  Per electronic medical record, "PMH of pancreatic cancer on chemotherapy"    DIET ORDER:   Diet, Pureed:   Mildly Thick Liquids (MILDTHICKLIQS) (11-17-24)      CURRENT DIET ORDER IS:  [] Appropriate:  [] Inadequate:  [x] Other: Please see below for recommendations     NUTRITION INTAKE/PROVISION:  [x] PO: Per nursing flowsheets, poor PO intake during admission, 0-25%. Patient visited today 11/18/2024, non-verbal during visit. Family not present at bedside. Unable to obtain PO intake history.  [] Enteral Nutrition:  [] Parenteral Nutrition:    ANTHROPOMETRICS:  Drug Dosing Weight  Height (cm): 152.4 (11 Nov 2024 21:00)  Weight (kg): 51.9 (11 Nov 2024 21:00)  BMI (kg/m2): 22.3 (11 Nov 2024 21:00)    Weights:   No recent available weights to review.     MEDICATIONS:  MEDICATIONS  (STANDING):  Biotene Dry Mouth Oral Rinse 5 milliLiter(s) Swish and Spit four times a day  enoxaparin Injectable 50 milliGRAM(s) SubCutaneous <User Schedule>  heparin  Infusion 1200 Unit(s)/Hr (11 mL/Hr) IV Continuous <Continuous>  pantoprazole  Injectable 40 milliGRAM(s) IV Push two times a day  piperacillin/tazobactam IVPB.. 3.375 Gram(s) IV Intermittent every 8 hours    MEDICATIONS  (PRN):  benzocaine 20% Spray 1 Spray(s) Topical three times a day PRN sore throat      NUTRITIONALLY PERTINENT LABS:  11-18 Na146 mmol/L[H] Glu 115 mg/dL[H] K+ 5.2 mmol/L Cr  0.51 mg/dL BUN 32 mg/dL[H] 11-13 Phos 3.5 mg/dL 11-13 Alb 2.0 g/dL[L]11-13 ALT 19 U/L AST 31 U/L Alkaline Phosphatase 233 U/L[H]      NUTRITIONALLY PERTINENT MEDICATIONS/LABS:  [x] Reviewed  [x] Relevant notes on medications/labs:  - Antibiotics in use  - Na 146 (H). Hypernatremia indicated.   - BUN 32 (H)  - Glucose 115 (H)    EDEMA:  [x] Reviewed: No edema noted per nursing flowsheets.   [] Relevant notes:    GI/ I&O:  [x] Reviewed: Per electronic medical record, last BM 11/18/2024  [] Relevant notes:  [] Other:    SKIN:   [] No pressure injuries documented, per nursing flowsheet  [x] Pressure injury previously noted: Per wound care 11/13/2024, "B/L heel hyperpigmentation, cannot rule out a deep tissue injury present on admission. B/L buttocks/sacral deep tissue injury in evolution, present on admission"  [] Change in pressure injury documentation:  [] Other:    ESTIMATED NEEDS:  [x] No change:  [] Updated:  Energy: 1709-1968kcals/day (33-38kcal/kg)  Protein: 75-93g/day (1.4-1.8g/kg)  Fluid:   ml/day or [x] defer to team  Based on: Current weight 51.8kg    NUTRITION DIAGNOSIS:  [x] Prior Dx: 1. Acute severe malnutrition 2. Increased nutrient needs   [] New Dx:    EDUCATION:  [] Yes:  [x] Not appropriate/warranted    NUTRITION CARE PLAN:  1. Continue Pureed Diet order as medically appropriate  - Defer necessary consistencies and textures to SLP team   2. Recommend trial Ensure Plus once daily to promote PO intake   3. Recommend daily MVI and Ascorbic Acid to promote wound healing   4. Resolve electrolyte derangement per medical team discretion   5. Monitor routine weights, nutrition related labs, PO intake and tolerance, oral nutrition supplement compliance, and skin integrity      [] Achieved - Continue current nutrition intervention(s)  [] Current medical condition precludes nutrition intervention at this time.    MONITORING AND EVALUATION:   RD remains available upon request and will follow up per protocol.    Maryan Wynn, NOELLE, CDN   Available on teams

## 2024-11-18 NOTE — PROGRESS NOTE ADULT - PROBLEM SELECTOR PLAN 2
- Follows w/ Dr. Posey at [facility]   - Per inpatient heme-onc: no plans for inpatient treatment  - Appreciate palliative care's GOC discussion as documented: patient is DNR/DNI w/ trial BiPAP and on comfort care (though without withdrawal of active treatment including Heparin gtt as above)  - Refer to palliative recommendations if family requests pain meds for patient (and other symptomatic care)

## 2024-11-18 NOTE — PROGRESS NOTE ADULT - SUBJECTIVE AND OBJECTIVE BOX
Radha Vo MD  Division of Hospital Medicine  Please contact via MS Teams (prefer message first)  Office: [phone]    Patient is a 74y old  Female who presents with a chief complaint of saddle PE (18 Nov 2024 11:18)      SUBJECTIVE / OVERNIGHT EVENTS:  no acute events overnight, vss, afebrile  pt still on HFNC, refused BIPAP overnight    ROS:  14 point ROS negative in detail except stated as above    MEDICATIONS  (STANDING):  Biotene Dry Mouth Oral Rinse 5 milliLiter(s) Swish and Spit four times a day  heparin  Infusion 1200 Unit(s)/Hr (11 mL/Hr) IV Continuous <Continuous>  pantoprazole  Injectable 40 milliGRAM(s) IV Push two times a day  piperacillin/tazobactam IVPB.. 3.375 Gram(s) IV Intermittent every 8 hours    MEDICATIONS  (PRN):  benzocaine 20% Spray 1 Spray(s) Topical three times a day PRN sore throat      CAPILLARY BLOOD GLUCOSE        I&O's Summary    17 Nov 2024 07:01  -  18 Nov 2024 07:00  --------------------------------------------------------  IN: 0 mL / OUT: 150 mL / NET: -150 mL    18 Nov 2024 07:01  -  18 Nov 2024 12:06  --------------------------------------------------------  IN: 0 mL / OUT: 0 mL / NET: 0 mL        PHYSICAL EXAM:  Vital Signs Last 24 Hrs  T(C): 37 (18 Nov 2024 11:44), Max: 37.3 (18 Nov 2024 04:02)  T(F): 98.6 (18 Nov 2024 11:44), Max: 99.1 (18 Nov 2024 04:02)  HR: 76 (18 Nov 2024 11:44) (70 - 78)  BP: 120/74 (18 Nov 2024 11:44) (104/63 - 130/65)  BP(mean): --  RR: 18 (18 Nov 2024 11:44) (18 - 18)  SpO2: 98% (18 Nov 2024 11:44) (94% - 98%)    Parameters below as of 18 Nov 2024 11:44  Patient On (Oxygen Delivery Method): nasal cannula, high flow  O2 Flow (L/min): 50  O2 Concentration (%): 80  GENERAL: NAD, well-developed  HEAD:  Atraumatic, Normocephalic  EYES: EOMI, PERRLA, conjunctiva and sclera clear  NECK: Supple, No JVD  CHEST/LUNG: Clear to auscultation bilaterally; No wheeze  HEART: Regular rate and rhythm; No murmurs, rubs, or gallops  ABDOMEN: Soft, Nontender, Nondistended; Bowel sounds present  EXTREMITIES:  2+ Peripheral Pulses, No clubbing, cyanosis, or edema  NEUROLOGY: AAOx3; non-focal  SKIN: No rashes or lesions    LABS:                        7.5    6.18  )-----------( 106      ( 18 Nov 2024 05:13 )             25.5     11-18    146[H]  |  112[H]  |  32[H]  ----------------------------<  115[H]  5.2   |  26  |  0.51    Ca    8.1[L]      18 Nov 2024 05:14      PT/INR - ( 17 Nov 2024 06:56 )   PT: 14.4 sec;   INR: 1.25 ratio         PTT - ( 18 Nov 2024 05:13 )  PTT:87.0 sec      Urinalysis Basic - ( 18 Nov 2024 05:14 )    Color: x / Appearance: x / SG: x / pH: x  Gluc: 115 mg/dL / Ketone: x  / Bili: x / Urobili: x   Blood: x / Protein: x / Nitrite: x   Leuk Esterase: x / RBC: x / WBC x   Sq Epi: x / Non Sq Epi: x / Bacteria: x        RADIOLOGY & ADDITIONAL TESTS:    Imaging Personally Reviewed:    Consultant(s) Notes Reviewed:  heme, palliative    Care Discussed with Consultants/Other Providers:

## 2024-11-18 NOTE — PROGRESS NOTE ADULT - ASSESSMENT
75 y/o F with PMH of pancreatic cancer on chemotherapy, DVT on Eliquis 2.5mg BID, HTN, recent admission for symptomatic anemia requiring blood transfusion presenting with SOB as well as dark stools found to have a PE.     Pancreatic Cancer   - on gem/abraxane (LD on 10/18/24)  - follows Dr. Kalpesh [facility]ASENCIA   - no plans for inpatient treatment     Severe anemia, dark stool  - Recently at [facility], received pRBC transfusions and was planned for outpatient EGD  - Received Aranesp 200 mcg LD 11/1.   - GI following, Outpatient EGD and colonoscopy once more medically stable and optimized   - Iron studies were low 11/1: iron 8, TIBC 162, % sat 5, ferritin 396; consider iron supplementation  - Recommend transfusion for hgb < 7    B/l DVT, new PE  - DVT diagnosed on 10/28  - started on eliquis which was held then dose reduced to 2.5 mg on recent admission   - CTA CAP: Saddle pulmonary embolus. No evidence of right heart strain. Diffuse symmetric groundglass and consolidation which is nonspecific. Multilobulated cystic liver mass. Differential diagnosis includes metastasis or abscess. Diffuse colonic and small bowel wall thickening compatible with enterocolitis. 1 cm nodular filling defect in the portal vein, suggestive of thrombus, either tumor thrombus or bland thrombus.  - Vascular following, no mechanical thrombectomy - not a candidate for advanced therapies given underlying co-morbidities, anemia and frailty  - Per GI, Benefits of A/C with massive PE outweighs risks as she has no active GI bleeding.   - c/o BiPAP  at night HFNC during the day, Plan to transition off heparin gtt to Lovenox SQ today  - Pt now DNR/DNI. Palliative care team following     will continue to follow.     Violetta Barajas NP  Hematology/ Oncology  [facility]  [phone] (office)  [phone] (alt office)  Evenings and weekends please call MD on call or office   73 y/o F with PMH of pancreatic cancer on chemotherapy, DVT on Eliquis 2.5mg BID, HTN, recent admission for symptomatic anemia requiring blood transfusion presenting with SOB as well as dark stools found to have a PE.     Pancreatic Cancer   - on gem/abraxane (LD on 10/18/24)  - follows Dr. Kalpesh [facility]AS[facility]   - no plans for inpatient treatment     Severe anemia, dark stool  - Recently at [facility], received pRBC transfusions and was planned for outpatient EGD  - Received Aranesp 200 mcg LD 11/1.   - GI following, Outpatient EGD and colonoscopy once more medically stable and optimized   - Iron studies were low 11/1: iron 8, TIBC 162, % sat 5, ferritin 396; consider iron supplementation  - Recommend transfusion for hgb < 7    B/l DVT, new PE  - DVT diagnosed on 10/28  - started on eliquis which was held then dose reduced to 2.5 mg on recent admission   - CTA CAP: Saddle pulmonary embolus. No evidence of right heart strain. Diffuse symmetric groundglass and consolidation which is nonspecific. Multilobulated cystic liver mass. Differential diagnosis includes metastasis or abscess. Diffuse colonic and small bowel wall thickening compatible with enterocolitis. 1 cm nodular filling defect in the portal vein, suggestive of thrombus, either tumor thrombus or bland thrombus.  - Vascular following, no mechanical thrombectomy - not a candidate for advanced therapies given underlying co-morbidities, anemia and frailty  - Per GI, Benefits of A/C with massive PE outweighs risks as she has no active GI bleeding.   - c/o BiPAP at night HFNC during the day, Plan to transition off heparin gtt to Lovenox SQ today  - Pt now DNR/DNI. Palliative care team following     will continue to follow.     Violetta Barajas NP  Hematology/ Oncology  [facility]  [phone] (office)  [phone] (alt office)  Evenings and weekends please call MD on call or office

## 2024-11-19 NOTE — PROGRESS NOTE ADULT - PROBLEM SELECTOR PLAN 5
HCP: son Shon (pending to bring form), spouse deferred decisions to his son and grandson   Code status: DNR/I trial NIV    Spoke this morning with Shon and grandson, their goal is patient's comfort without deescalation of care, would like to continue Lovenox, and HFNC during the day and BiPAP at bedtime. Agreed to transfer to PCU management of symptoms and disposition planning which will be guide by clinical course

## 2024-11-19 NOTE — PROGRESS NOTE ADULT - SUBJECTIVE AND OBJECTIVE BOX
Patient is a 74y old  Female who presents with a chief complaint of saddle PE (18 Nov 2024 12:06)    Patient seen and examined  Appears comfortable, daughter at bedside    MEDICATIONS  (STANDING):  Biotene Dry Mouth Oral Rinse 5 milliLiter(s) Swish and Spit four times a day  enoxaparin Injectable 50 milliGRAM(s) SubCutaneous <User Schedule>  pantoprazole  Injectable 40 milliGRAM(s) IV Push two times a day    MEDICATIONS  (PRN):  benzocaine 20% Spray 1 Spray(s) Topical three times a day PRN sore throat      Vital Signs Last 24 Hrs  T(C): 37 (18 Nov 2024 11:44), Max: 37 (18 Nov 2024 11:44)  T(F): 98.6 (18 Nov 2024 11:44), Max: 98.6 (18 Nov 2024 11:44)  HR: 74 (18 Nov 2024 15:01) (74 - 76)  BP: 120/74 (18 Nov 2024 11:44) (120/74 - 120/74)  BP(mean): --  RR: 18 (18 Nov 2024 15:01) (18 - 18)  SpO2: 98% (18 Nov 2024 15:01) (98% - 98%)    Parameters below as of 18 Nov 2024 15:01  Patient On (Oxygen Delivery Method): nasal cannula, high flow  O2 Flow (L/min): 50  O2 Concentration (%): 80    PE  frail  Awake, alert  Anicteric  +HFNC  Abd soft, NT, ND  No c/c                        7.5    6.18  )-----------( 106      ( 18 Nov 2024 05:13 )             25.5       11-18    146[H]  |  112[H]  |  32[H]  ----------------------------<  115[H]  5.2   |  26  |  0.51    Ca    8.1[L]      18 Nov 2024 05:14

## 2024-11-19 NOTE — PROGRESS NOTE ADULT - PROBLEM SELECTOR PLAN 4
Patient this morning reported throat pain  - start morphine  1 mg IVP q4h prn moderate pain  - start morphine 2 mg IVP q4h prn severe pain  - Bowel regimen while on opioids.  Monitor for constipation.

## 2024-11-19 NOTE — PROGRESS NOTE ADULT - SUBJECTIVE AND OBJECTIVE BOX
Per Dr. Alayna Sood pt to have PICC line instead of Central line.  Consent printed for  [facility] team contacted in perfect serve      Pavithra Davis RN  11/20/20 [id number] SUBJECTIVE AND OBJECTIVE:  Indication for Geriatrics and Palliative Care Services/INTERVAL HPI:    OVERNIGHT EVENTS:    DNR on chart:DNI: Trial NIV  DNI: Trial NIV      Allergies    No Known Allergies    Intolerances    MEDICATIONS  (STANDING):  Biotene Dry Mouth Oral Rinse 5 milliLiter(s) Swish and Spit four times a day  enoxaparin Injectable 50 milliGRAM(s) SubCutaneous <User Schedule>  pantoprazole  Injectable 40 milliGRAM(s) IV Push two times a day  senna 2 Tablet(s) Oral at bedtime    MEDICATIONS  (PRN):  acetaminophen   IVPB .. 1000 milliGRAM(s) IV Intermittent once PRN Mild Pain (1 - 3)  benzocaine 20% Spray 1 Spray(s) Topical three times a day PRN sore throat  morphine  - Injectable 1 milliGRAM(s) IV Push every 4 hours PRN Moderate Pain (4 - 6)  morphine  - Injectable 2 milliGRAM(s) IV Push every 4 hours PRN Severe Pain (7 - 10)      ITEMS UNCHECKED ARE NOT PRESENT    PRESENT SYMPTOMS: [ ]Unable to self-report - see  CPOT, PAINADS, RDOS below  Source if other than patient:  [ ]Family   [ ]Team     Pain:  [ ]yes [ ]no  QOL impact -   Location -                    Aggravating factors -  Quality -  Radiation -  Timing-  Severity (0-10 scale):  Minimal acceptable level (0-10 scale):     Dyspnea:                           [ ]Mild [ ]Moderate [ ]Severe  Anxiety:                             [ ]Mild [ ]Moderate [ ]Severe  Fatigue:                             [ ]Mild [ ]Moderate [ ]Severe  Nausea:                             [ ]Mild [ ]Moderate [ ]Severe  Loss of appetite:              [ ]Mild [ ]Moderate [ ]Severe  Constipation:                    [ ]Mild [ ]Moderate [ ]Severe    PCSSQ[Palliative Care Spiritual Screening Question]   Severity (0-10):  Score of 4 or > indicate consideration of Chaplaincy referral.  Chaplaincy Referral: [ ] yes [ ] refused [ ] following    Caregiver [place]? : [ ] yes [ ] no  [ ] deferred:  Social work referral [ ] Patient & Family Centered Care Referral [ ]     Anticipatory Grief present?:  [ ] yes [ ] no  [ ] deferred: Social work referral [ ] Patient & Family Centered Care Referral [ ]      Other Symptoms:  [ ]All other review of systems negative     PHYSICAL EXAM:  Vital Signs Last 24 Hrs  T(C): 36.3 (19 Nov 2024 11:07), Max: 36.3 (19 Nov 2024 11:07)  T(F): 97.4 (19 Nov 2024 11:07), Max: 97.4 (19 Nov 2024 11:07)  HR: 69 (19 Nov 2024 11:07) (69 - 74)  BP: 117/56 (19 Nov 2024 11:07) (117/56 - 117/56)  BP(mean): --  RR: 18 (19 Nov 2024 11:07) (18 - 18)  SpO2: 97% (19 Nov 2024 11:07) (97% - 98%)    Parameters below as of 19 Nov 2024 11:07  Patient On (Oxygen Delivery Method): nasal cannula, high flow     I&O's Summary    18 Nov 2024 07:01  -  19 Nov 2024 07:00  --------------------------------------------------------  IN: 200 mL / OUT: 250 mL / NET: -50 mL    19 Nov 2024 07:01  -  19 Nov 2024 12:56  --------------------------------------------------------  IN: 240 mL / OUT: 0 mL / NET: 240 mL       GENERAL: [ ]Cachexia    [ ]Alert  [ ]Oriented x   [ ]Lethargic  [ ]Unarousable  [ ]Verbal  [ ]Non-Verbal  Behavioral:   [ ]Anxiety  [ ]Delirium [ ]Agitation [ ]Other  HEENT:  [ ]Normal   [ ]Dry mouth   [ ]ET Tube/Trach  [ ]Oral lesions  PULMONARY:   [ ]Clear [ ]Tachypnea  [ ]Audible excessive secretions   [ ]Rhonchi        [ ]Right [ ]Left [ ]Bilateral  [ ]Crackles        [ ]Right [ ]Left [ ]Bilateral  [ ]Wheezing     [ ]Right [ ]Left [ ]Bilateral  [ ]Diminished BS [ ] Right [ ]Left [ ]Bilateral  CARDIOVASCULAR:    [ ]Regular [ ]Irregular [ ]Tachy  [ ]Emery [ ]Murmur [ ]Other  GASTROINTESTINAL:  [ ]Soft  [ ]Distended   [ ]+BS  [ ]Non tender [ ]Tender  [ ]Other [ ]PEG [ ]OGT/ NGT   Last BM:   GENITOURINARY:  [ ]Normal [ ]Incontinent   [ ]Oliguria/Anuria   [ ]Vilchis  MUSCULOSKELETAL:   [ ]Normal   [ ]Weakness  [ ]Bed/Wheelchair bound [ ]Edema  NEUROLOGIC:   [ ]No focal deficits  [ ] Cognitive impairment  [ ] Dysphagia [ ]Dysarthria [ ] Paresis [ ]Other   SKIN:   [ ]Normal  [ ]Rash  [ ]Other  [ ]Pressure ulcer(s) [ ]y [ ]n present on admission    CRITICAL CARE:  [ ]Shock Present  [ ]Septic [ ]Cardiogenic [ ]Neurologic [ ]Hypovolemic  [ ]Vasopressors [ ]Inotropes  [ ]Respiratory failure present [ ]Mechanical Ventilation [ ]Non-invasive ventilatory support [ ]High-Flow   [ ]Acute  [ ]Chronic [ ]Hypoxic  [ ]Hypercarbic [ ]Other  [ ]Other organ failure     LABS:                        7.5    6.18  )-----------( 106      ( 18 Nov 2024 05:13 )             25.5   11-18    146[H]  |  112[H]  |  32[H]  ----------------------------<  115[H]  5.2   |  26  |  0.51    Ca    8.1[L]      18 Nov 2024 05:14    PTT - ( 18 Nov 2024 05:13 )  PTT:87.0 sec    Urinalysis Basic - ( 18 Nov 2024 05:14 )    Color: x / Appearance: x / SG: x / pH: x  Gluc: 115 mg/dL / Ketone: x  / Bili: x / Urobili: x   Blood: x / Protein: x / Nitrite: x   Leuk Esterase: x / RBC: x / WBC x   Sq Epi: x / Non Sq Epi: x / Bacteria: x      RADIOLOGY & ADDITIONAL STUDIES: reviewed none new      Protein Calorie Malnutrition Present: [ ]mild [ ]moderate [ x]severe [ ]underweight [ ]morbid obesity  https://www.andeal.org/vault/2440/web/files/ONC/Table_Clinical%20Characteristics%20to%20Document%20Malnutrition-White%20JV%20et%20al%202012.pdf    Height (cm): 152.4 (11-11-24 @ 21:00), 149.9 (11-11-24 @ 13:29), 149.9 (11-01-24 @ 10:52)  Weight (kg): 51.9 (11-11-24 @ 21:00), 49 (11-11-24 @ 13:29), 47.6 (11-01-24 @ 10:52)  BMI (kg/m2): 22.3 (11-11-24 @ 21:00), 21.8 (11-11-24 @ 13:29), 21.2 (11-01-24 @ 10:52)    [ x]PPSV2 < or = 30%  [ ]significant weight loss [ x]poor nutritional intake [ ]anasarca[ ]Artificial Nutrition    Other REFERRALS:  [ ]Hospice  [ ]Child Life  [x ]Social Work  [x ]Case management [ ]Holistic Therapy                                        Progress Notes    PROGRESS NOTE  Date & Time of Note   2024-11-19 11:06    Notes    Notes: Chart reviewed by covering . Patient remains active on HiFlo  O2, IV pantoprazole. Followed by palliative care. Per am IDR possible PCU bed.       Electronically signed by:  Maria Alejandra Baez  Electronically signed on:  2024-11-19  11:07      Palliative Performance Scale:  http://npcrc.org/files/news/palliative_performance_scale_ppsv2.pdf  (Ctrl +  left click to view)  Respiratory Distress Observation Tool:  https://homecareinformation.net/handouts/hen/Respiratory_Distress_Observation_Scale.pdf (Ctrl +  left click to view)  PAINAD Score:  http://geriatrictoolkit.[place].[facility]/cog/painad.pdf (Ctrl +  left click to view)

## 2024-11-19 NOTE — PROGRESS NOTE ADULT - PROBLEM SELECTOR PLAN 3
- Continue on high flow 50/100 91%-92%--. can decrease the FIO2 as warranted   - Wean as tolerated  - GOC: DNR/DNI w/ trial BiPAP  - 11/11: CT abd/pelvis: Diffuse colonic and small bowel thickening indicative of colitis. Multilobulated cystic liver mass indicative of metastasis vs. abscess  - Blood cultures neg, MRSA swab neg, (+)MSSA, started Mupirocin  - s/p 7 day course of zosyn

## 2024-11-19 NOTE — PROGRESS NOTE ADULT - TIME BILLING
Total Time Spent 60 minutes.    This includes chart review, patient assessment, discussion and collaboration with interdisciplinary team members, excluding ACP.    COUNSELING:  Face to face meeting to discuss Advanced Care Planning- Time Spent 30 minutes

## 2024-11-19 NOTE — PROGRESS NOTE ADULT - PROBLEM SELECTOR PLAN 2
- Follows w/ Dr. Posey at [facility]   - Per inpatient heme-onc: no plans for inpatient treatment  - Appreciate palliative care's GOC discussion as documented: patient is DNR/DNI w/ trial BiPAP and on comfort care (though without withdrawal of active treatment including full AC and weaning off HFNC)  - appreciate palliative recommendations if family requests pain meds for patient (and other symptomatic care)

## 2024-11-19 NOTE — PHARMACOTHERAPY INTERVENTION NOTE - COMMENTS
75 y/o F with PMH of pancreatic cancer on chemotherapy, DVT on Eliquis 2.5mg BID, HTN. Patient is being treated for Acute hypoxic respiratory failure and on  IV Zosyn per GOC to complete 7day course: 11/11- 11/18. Patient completed her course of treatment on 11/18, recommending to dc IV zosyn antibiotics today.    Harry (Jimmy Del Castillo) Bimal - PharmD, BCPS  Transitions of Care Pharmacist  Available on [employer] Teams (Preferred)  Spectra: [phone]

## 2024-11-19 NOTE — PROGRESS NOTE ADULT - ASSESSMENT
75 y/o F with PMH of pancreatic cancer on chemotherapy, DVT on Eliquis 2.5mg BID, HTN, recent admission for symptomatic anemia requiring blood transfusion presenting with SOB as well as dark stools found to have a PE.     Pancreatic Cancer   - on gem/abraxane (LD on 10/18/24)  - follows Dr. Kalpesh [facility]   - no plans for inpatient treatment     Severe anemia, dark stool  - Recently at [place], received pRBC transfusions and was planned for outpatient EGD  - Received Aranesp 200 mcg LD 11/1.   - GI following, Outpatient EGD and colonoscopy once more medically stable and optimized   - Iron studies were low 11/1: iron 8, TIBC 162, % sat 5, ferritin 396; consider iron supplementation  - Recommend transfusion for hgb < 7    B/l DVT, new PE  - DVT diagnosed on 10/28  - started on eliquis which was held then dose reduced to 2.5 mg on recent admission   - CTA CAP: Saddle pulmonary embolus. No evidence of right heart strain. Diffuse symmetric groundglass and consolidation which is nonspecific. Multilobulated cystic liver mass. Differential diagnosis includes metastasis or abscess. Diffuse colonic and small bowel wall thickening compatible with enterocolitis. 1 cm nodular filling defect in the portal vein, suggestive of thrombus, either tumor thrombus or bland thrombus.  - Vascular following, no mechanical thrombectomy - not a candidate for advanced therapies given underlying co-morbidities, anemia and frailty  - Per GI, Benefits of A/C with massive PE outweighs risks as she has no active GI bleeding.   - c/o BiPAP at night HFNC during the day  - now on Lovenox 50 BID  - Pt now DNR/DNI. Palliative care team following     will continue to follow.     Violetta Barajas NP  Hematology/ Oncology  [facility]  [phone] (office)  [phone] (alt office)  Evenings and weekends please call MD on call or office

## 2024-11-19 NOTE — PROGRESS NOTE ADULT - PROBLEM SELECTOR PLAN 1
- DVT diagnosed on 10/28 and patient was started on Eliquis which was held then dose reduced to 2.5 mg on recent admission   - CTA CAP: Saddle pulmonary embolus. No evidence of right heart strain. Diffuse symmetric groundglass and consolidation which is nonspecific. Multilobulated cystic liver mass. Differential diagnosis includes metastasis or abscess. Diffuse colonic and small bowel wall thickening compatible with enterocolitis. 1 cm nodular filling defect in the portal vein, suggestive of thrombus, either tumor thrombus or bland thrombus.  - Vascular following, no mechanical thrombectomy - not a candidate for advanced therapies given underlying co-morbidities, anemia and frailty  - Per GI, Benefits of A/C with massive PE outweighs risks as she has no active GI bleeding.   - Family would like to continue full AC, agreed to lovenox  - Monitor respiratory status as below

## 2024-11-19 NOTE — PROGRESS NOTE ADULT - SUBJECTIVE AND OBJECTIVE BOX
Radha Vo MD  Division of Hospital Medicine  Please contact via MS Teams (prefer message first)  Office: [phone]    Patient is a 74y old  Female who presents with a chief complaint of saddle PE (19 Nov 2024 10:29)      SUBJECTIVE / OVERNIGHT EVENTS:  no acute events overnight, pr remains on HFNC 80/50  pt complains of dryness in mouth and throat pain  as per nurse, in lot of pain with cleaning and turning  joined the call to grandson with Dr. Epstein - agreeable to prn symptom management    ROS:  14 point ROS negative in detail except stated as above    MEDICATIONS  (STANDING):  Biotene Dry Mouth Oral Rinse 5 milliLiter(s) Swish and Spit four times a day  enoxaparin Injectable 50 milliGRAM(s) SubCutaneous <User Schedule>  pantoprazole  Injectable 40 milliGRAM(s) IV Push two times a day    MEDICATIONS  (PRN):  benzocaine 20% Spray 1 Spray(s) Topical three times a day PRN sore throat      CAPILLARY BLOOD GLUCOSE        I&O's Summary    18 Nov 2024 07:01  -  19 Nov 2024 07:00  --------------------------------------------------------  IN: 200 mL / OUT: 250 mL / NET: -50 mL    19 Nov 2024 07:01  -  19 Nov 2024 12:08  --------------------------------------------------------  IN: 240 mL / OUT: 0 mL / NET: 240 mL        PHYSICAL EXAM:  Vital Signs Last 24 Hrs  T(C): 36.3 (19 Nov 2024 11:07), Max: 36.3 (19 Nov 2024 11:07)  T(F): 97.4 (19 Nov 2024 11:07), Max: 97.4 (19 Nov 2024 11:07)  HR: 69 (19 Nov 2024 11:07) (69 - 74)  BP: 117/56 (19 Nov 2024 11:07) (117/56 - 117/56)  BP(mean): --  RR: 18 (19 Nov 2024 11:07) (18 - 18)  SpO2: 97% (19 Nov 2024 11:07) (97% - 98%)    Parameters below as of 19 Nov 2024 11:07  Patient On (Oxygen Delivery Method): nasal cannula, high flow      GENERAL: NAD, well-developed  HEAD:  Atraumatic, Normocephalic  EYES: EOMI, PERRLA, conjunctiva and sclera clear  NECK: Supple, No JVD  CHEST/LUNG: Clear to auscultation bilaterally; No wheeze  HEART: Regular rate and rhythm; No murmurs, rubs, or gallops  ABDOMEN: Soft, Nontender, Nondistended; Bowel sounds present  EXTREMITIES:  2+ Peripheral Pulses, No clubbing, cyanosis, or edema  NEUROLOGY: lethargic  SKIN: No rashes or lesions    LABS:                        7.5    6.18  )-----------( 106      ( 18 Nov 2024 05:13 )             25.5     11-18    146[H]  |  112[H]  |  32[H]  ----------------------------<  115[H]  5.2   |  26  |  0.51    Ca    8.1[L]      18 Nov 2024 05:14      PTT - ( 18 Nov 2024 05:13 )  PTT:87.0 sec      Urinalysis Basic - ( 18 Nov 2024 05:14 )    Color: x / Appearance: x / SG: x / pH: x  Gluc: 115 mg/dL / Ketone: x  / Bili: x / Urobili: x   Blood: x / Protein: x / Nitrite: x   Leuk Esterase: x / RBC: x / WBC x   Sq Epi: x / Non Sq Epi: x / Bacteria: x        RADIOLOGY & ADDITIONAL TESTS:    Imaging Personally Reviewed:    Consultant(s) Notes Reviewed:  palliative care    Care Discussed with Consultants/Other Providers: Dr. Epstein (pall)

## 2024-11-19 NOTE — PROGRESS NOTE ADULT - CONVERSATION DETAILS
Called Shon and discussed the medical condition for the patient. Explained her guarded prognosis - he verbalized the understanding of her conditions but wanted to keep her alive as long as possible. Agrees with DNR/DNI, trial of NIV but does not want to de-escalate the care at this time. Understands that she may not be able to leave the hospital due to high O2 requirement at this time and may  in the hospital. He is open to transfer to PCU as long as she can be continued on current regimen (systemic AC, highflow NC). Agreeable to transition to lovenox SQ today as well. Will discuss with hospice team
Spoke with Shon this morning about the patient's deteriorating condition. Discussed that with various comorbidities and poor functional status, the patient was at high risk of further medical events. Discussed the option of comfort care to prioritize end-of-life symptom management with a de-escalation of other interventions that would be burdensome at the end of life.        Shon states that it has been difficult for him to make this decision, given that the patient is showing medical improvement some days. However, they have a good understanding that the patient has an unfortunately uncurable medical condition.  Offered transfer to palliative care, son and grandson would like to continue the current level of care; Lovenox and HFNC without de-escalation of care, also explained about symptoms management, they stated that opioids would affect patient mentation, explained about pain management, risk and benefits of opioids medications, they agreed to give as needed it.     Shon agreed with PCU transfer,  requested to continue AC, and current level of care without de-escalation to oxygen requirements.

## 2024-11-19 NOTE — PROGRESS NOTE ADULT - ASSESSMENT
73 y/o F with PMH of pancreatic cancer on chemotherapy, DVT on Eliquis 2.5mg BID, HTN, recent admission for symptomatic anemia requiring blood transfusion presenting with SOB found to have a saddle PE, now on lovenox and remains on HFNC

## 2024-11-19 NOTE — PROGRESS NOTE ADULT - PROBLEM SELECTOR PLAN 1
CT Angio Chest PE Protocol w/ IV Cont (11.11.24 @ 16:36) Saddle pulmonary embolus. No evidence of right heart strain. Diffuse symmetric ground glass and consolidation which is nonspecific. Differential includes edema, hemorrhage, and infection. Multilobulated cystic liver mass. Differential diagnosis includes  metastasis or abscess.  Diffuse colonic and small bowel wall thickening compatible with enterocolitis. 1 cm nodular filling defect in the portal vein, suggestive of thrombus,  either tumor thrombus or bland thrombus.  - Family would like to continue AC.   - symptom directed approach.

## 2024-11-19 NOTE — PROGRESS NOTE ADULT - PROBLEM SELECTOR PLAN 6
Patient will be transfer to PCU  Discussed with primary team     Jane Epstein MD   Geriatrics and Palliative Care Attending   Health system     Please contact me via Teams from Monday through Friday between 9am-5pm. If not answering, please call the palliative care pager [phone]    After 5pm and on weekends, please see the contact information below:    In the event of newly developing, evolving, or worsening symptoms, please contact the Palliative Medicine team via pager (if the patient is at [facility] #[phone] or if the patient is at [facility] #[phone]) The Geriatric and Palliative Medicine service has coverage 24 hours a day/ 7 days a week to provide medical recommendations regarding symptom management needs via telephone.

## 2024-11-19 NOTE — PROGRESS NOTE ADULT - PROBLEM SELECTOR PLAN 3
On BiPAP at night time and HFNC during the day.  Family would not like de-escalation of care, but open to continue discussion depending of patient's clinical course

## 2024-11-20 NOTE — PROGRESS NOTE ADULT - PROBLEM SELECTOR PLAN 5
HCP: son Shon (pending to bring form), spouse deferred decisions to his son and grandson   Code status: DNR/I trial NIV    Spoke this morning with Shon and grandson, their goal is patient's comfort without deescalation of care, would like to continue Lovenox, and HFNC during the day and BiPAP at bedtime. Agreed to transfer to PCU management of symptoms and disposition planning which will be guide by clinical course HCP: son Shon (pending to bring form), spouse deferred decisions to his son and grandson   Code status: DNR/I trial NIV

## 2024-11-20 NOTE — PROGRESS NOTE ADULT - ASSESSMENT
74 year old woman w/ PMHx of pancreatic cancer on chemotherapy, DVT on Eliquis 2.5mg BID, HTN, recent admission for symptomatic anemia requiring blood transfusion. Now presenting with SOB as well as melena s/p transfusion 11/11, found to have saddle PE and started on HFNC. GaP team consulted for complex medical decision making in the setting of serious illness and symptoms management. Now transferred to PCU for comfort care.

## 2024-11-20 NOTE — PROGRESS NOTE ADULT - TIME BILLING
minutes spent on total encounter. The necessity of the time spent during the encounter on this date of service was due to:     Total time spent including the following  [ x] Physical chart review and documentation   An extensive review of the physical chart, electronic health record, and documentation was conducted to obtain collateral information including but not limited to:   - Current inpatient records (ED, H&P, primary team, and consultants [IR])   - Inpatient values/results (CBC, CMP, CT)   - Prior inpatient records   - Current or proposed treatment plans   - Pharmacotherapy review   [ x]discussion with the interdisciplinary palliative care team -RN, , clinicians, trainees,   [ x]discussion with the patient/family/decision maker  [x ]Physical Exam and/or review of systems   [ x]Formulation of assessment and plan   [ ]Evaluating for response to treatment and side effects of opioids or benzodiazepines.  [x ]Review of care coordination documentation.

## 2024-11-20 NOTE — PROGRESS NOTE ADULT - SUBJECTIVE AND OBJECTIVE BOX
GAP TEAM PALLIATIVE CARE UNIT PROGRESS NOTE:      [  ] Patient on hospice program.    INDICATION FOR PALLIATIVE CARE UNIT SERVICES/Interval HPI: 74 year old woman w/ PMHx of pancreatic cancer on chemotherapy, DVT on Eliquis 2.5mg BID, HTN, recent admission for symptomatic anemia requiring blood transfusion. Now presenting with SOB as well as melena s/p transfusion 11/11, found to have saddle PE and started on HFNC. Now transferred to PCU for comfort care.    OVERNIGHT EVENTS: No acute event overnight, seen sleeping comfortably, in no distress, complaints of some sore throat     DNR on chart:  DNI: Trial NIV  DNI: Trial NIV        Allergies    No Known Allergies    Intolerances    MEDICATIONS  (STANDING):  Biotene Dry Mouth Oral Rinse 5 milliLiter(s) Swish and Spit four times a day  enoxaparin Injectable 50 milliGRAM(s) SubCutaneous <User Schedule>  pantoprazole  Injectable 40 milliGRAM(s) IV Push two times a day    MEDICATIONS  (PRN):  acetaminophen   IVPB .. 1000 milliGRAM(s) IV Intermittent once PRN Mild Pain (1 - 3)  acetaminophen  Suppository .. 650 milliGRAM(s) Rectal every 8 hours PRN Temp greater or equal to 38C (100.4F), Mild Pain (1 - 3)  benzocaine 20% Spray 1 Spray(s) Topical three times a day PRN sore throat  bisacodyl Suppository 10 milliGRAM(s) Rectal daily PRN Constipation  glycopyrrolate Injectable 0.4 milliGRAM(s) IV Push every 6 hours PRN secretions  morphine  - Injectable 1 milliGRAM(s) IV Push every 4 hours PRN Moderate Pain (4 - 6)  morphine  - Injectable 2 milliGRAM(s) IV Push every 4 hours PRN Severe Pain (7 - 10)  morphine  - Injectable 2 milliGRAM(s) IV Push every 4 hours PRN dyspnea    ITEMS UNCHECKED ARE NOT PRESENT    PRESENT SYMPTOMS: [ ]Unable to self-report see PAINAD, RDOS below  Source if other than patient:  [ ]Family   [ ]Team     Pain: [ ] yes [ ] no  QOL impact -   Location -                    Aggravating factors -  Quality -  Radiation -  Timing-  Severity (0-10 scale):  Minimal acceptable level (0-10 scale):       PCSSQ [Palliative Care Spiritual Screening Question]   Severity (0-10):  Score of 4 or > indicate consideration of Chaplaincy referral.  Chaplaincy Referral: [ ] yes [ ] refused [ ] following [ ] deferred    Caregiver [place]? : [ ] yes [ ] no [ ] deferred:  Social work referral [ ] Patient & Family Centered Care Referral [ ]   Anticipatory Grief present?:  [ ] yes [ ] no [ ] deferred:  Social work referral [ ] Patient & Family Centered Care Referral [ ]  	  Other Symptoms:  [ ]All other review of systems negative     PHYSICAL EXAM:   Vital Signs Last 24 Hrs  T(C): 36.7 (19 Nov 2024 14:50), Max: 36.7 (19 Nov 2024 14:50)  T(F): 98.1 (19 Nov 2024 14:50), Max: 98.1 (19 Nov 2024 14:50)  HR: 72 (19 Nov 2024 22:00) (66 - 72)  BP: 137/70 (19 Nov 2024 14:50) (117/56 - 137/70)  BP(mean): --  RR: 16 (20 Nov 2024 10:12) (16 - 18)  SpO2: 95% (20 Nov 2024 10:12) (95% - 99%)    Parameters below as of 20 Nov 2024 10:12  Patient On (Oxygen Delivery Method): nasal cannula, high flow  O2 Flow (L/min): 50  O2 Concentration (%): 80 I&O's Summary    19 Nov 2024 07:01  -  20 Nov 2024 07:00  --------------------------------------------------------  IN: 240 mL / OUT: 100 mL / NET: 140 mL      GENERAL: [ ] Cachexia  [ ]Alert  [ ]Oriented x   [ ]Lethargic  [ ]Unarousable  [ ]Verbal  [ ]Non-Verbal  Behavioral:   [ ] Anxiety  [ ] Delirium [ ] Agitation [ ] Other  HEENT:  [ ]Normal   [ ]Dry mouth   [ ]ET Tube/Trach  [ ]Oral lesions  PULMONARY:   [ ]Clear [ ]Tachypnea  [ ]Audible excessive secretions   [ ]Rhonchi        [ ]Right [ ]Left [ ]Bilateral  [ ]Crackles        [ ]Right [ ]Left [ ]Bilateral  [ ]Wheezing     [ ]Right [ ]Left [ ]Bilateral  [ ]Diminished BS [ ]Right [ ]Left [ ]Bilateral    CARDIOVASCULAR:    [ ]Regular [ ]Irregular [ ]Tachy  [ ]Emery [ ]Murmur [ ]Other  GASTROINTESTINAL:  [ ]Soft  [ ]Distended   [ ]+BS  [ ]Non tender [ ]Tender  [ ]Other [ ]PEG [ ]OGT/ NGT   Last BM:    GENITOURINARY:  [ ]Normal [ ] Incontinent   [ ]Oliguria/Anuria   [ ]Vilchis  MUSCULOSKELETAL:   [ ]Normal   [ ]Weakness  [ ]Bed/Wheelchair bound [ ]Edema  NEUROLOGIC:   [ ]No focal deficits  [ ] Cognitive impairment  [ ] Dysphagia [ ]Dysarthria [ ] Paresis [ ]Other   SKIN:   [ ]Normal  [ ]Rash  [ ]Other  [ ]Pressure ulcer(s)  [ ]y [ ]n  Present on admission      CRITICAL CARE:  [ ] Shock Present  [ ]Septic [ ]Cardiogenic [ ]Neurologic [ ]Hypovolemic  [ ]  Vasopressors [ ]  Inotropes   [ ] Respiratory failure present [ ] Mechanical Ventilation [ ] Non-invasive ventilatory support [ ] High-Flow    [ ] Acute  [ ] Chronic [ ] Hypoxic  [ ] Hypercarbic [ ] Other  [ ] Other organ failure     LABS:            RADIOLOGY & ADDITIONAL STUDIES:    PROTEIN CALORIE MALNUTRITION: [ ] mild [ ] moderate [ ] severe  [ ] underweight [ ] morbid obesity    https://www.andeal.org/vault/2440/web/files/ONC/Table_Clinical%20Characteristics%20to%20Document%20Malnutrition-White%20JV%20et%20al%202012.pdf        [ ] PPSV2 < or = 30% [ ] significant weight loss [ ] poor nutritional intake [ ] anasarca   Artificial Nutrition [ ]     Other REFERRALS:    [ ] Hospice  [ ]Child Life  [ ]Social Work  [ ]Case management [ ]Holistic Therapy [ ] Physical Therapy [ ] Dietary         Palliative Performance Scale:  http://npcrc.org/files/news/palliative_performance_scale_ppsv2.pdf  (Ctrl +  left click to view)  Respiratory Distress Observation Tool:  https://homecareinformation.net/handouts/hen/Respiratory_Distress_Observation_Scale.pdf (Ctrl +  left click to view)  PAINAD Score:  http://geriatrictoolkit.[facility]/cog/painad.pdf (Ctrl +  left click to view)   GAP TEAM PALLIATIVE CARE UNIT PROGRESS NOTE:      [  ] Patient on hospice program.    INDICATION FOR PALLIATIVE CARE UNIT SERVICES/Interval HPI: 74 year old woman w/ PMHx of pancreatic cancer on chemotherapy, DVT on Eliquis 2.5mg BID, HTN, recent admission for symptomatic anemia requiring blood transfusion. Now presenting with SOB as well as melena s/p transfusion 11/11, found to have saddle PE and started on HFNC. Now transferred to PCU for comfort care.    OVERNIGHT EVENTS: No acute event overnight, seen sleeping comfortably, in no distress, complaints of some sore throat     DNR on chart:  DNI: Trial NIV  DNI: Trial NIV        Allergies    No Known Allergies    Intolerances    MEDICATIONS  (STANDING):  Biotene Dry Mouth Oral Rinse 5 milliLiter(s) Swish and Spit four times a day  enoxaparin Injectable 50 milliGRAM(s) SubCutaneous <User Schedule>  pantoprazole  Injectable 40 milliGRAM(s) IV Push two times a day    MEDICATIONS  (PRN):  acetaminophen   IVPB .. 1000 milliGRAM(s) IV Intermittent once PRN Mild Pain (1 - 3)  acetaminophen  Suppository .. 650 milliGRAM(s) Rectal every 8 hours PRN Temp greater or equal to 38C (100.4F), Mild Pain (1 - 3)  benzocaine 20% Spray 1 Spray(s) Topical three times a day PRN sore throat  bisacodyl Suppository 10 milliGRAM(s) Rectal daily PRN Constipation  glycopyrrolate Injectable 0.4 milliGRAM(s) IV Push every 6 hours PRN secretions  morphine  - Injectable 1 milliGRAM(s) IV Push every 4 hours PRN Moderate Pain (4 - 6)  morphine  - Injectable 2 milliGRAM(s) IV Push every 4 hours PRN Severe Pain (7 - 10)  morphine  - Injectable 2 milliGRAM(s) IV Push every 4 hours PRN dyspnea    ITEMS UNCHECKED ARE NOT PRESENT    PRESENT SYMPTOMS: [ ]Unable to self-report see PAINAD, RDOS below  Source if other than patient:  [ ]Family   [ ]Team     Pain: [x] yes [ ] no  QOL impact -   Location - throat                   Aggravating factors - dry air from HFNC/BIPAP  Quality - Sore/Sharp  Radiation - Non radiating  Timing- Constant, waxing and waning  Severity (0-10 scale):  Minimal acceptable level (0-10 scale):       PCSSQ [Palliative Care Spiritual Screening Question]   Severity (0-10):  Score of 4 or > indicate consideration of Chaplaincy referral.  Chaplaincy Referral: [ ] yes [ ] refused [ ] following [ ] deferred    Caregiver [place]? : [ ] yes [x] no [ ] deferred:  Social work referral [ ] Patient & Family Centered Care Referral [ ]   Anticipatory Grief present?:  [ ] yes [x] no [ ] deferred:  Social work referral [ ] Patient & Family Centered Care Referral [ ]  	  Other Symptoms:  [x]All other review of systems negative    PHYSICAL EXAM:   Vital Signs Last 24 Hrs  T(C): 36.7 (19 Nov 2024 14:50), Max: 36.7 (19 Nov 2024 14:50)  T(F): 98.1 (19 Nov 2024 14:50), Max: 98.1 (19 Nov 2024 14:50)  HR: 72 (19 Nov 2024 22:00) (66 - 72)  BP: 137/70 (19 Nov 2024 14:50) (117/56 - 137/70)  BP(mean): --  RR: 16 (20 Nov 2024 10:12) (16 - 18)  SpO2: 95% (20 Nov 2024 10:12) (95% - 99%)    Parameters below as of 20 Nov 2024 10:12  Patient On (Oxygen Delivery Method): nasal cannula, high flow  O2 Flow (L/min): 50  O2 Concentration (%): 80 I&O's Summary    19 Nov 2024 07:01  -  20 Nov 2024 07:00  --------------------------------------------------------  IN: 240 mL / OUT: 100 mL / NET: 140 mL      GENERAL: [ ] Cachexia  [x]Alert  [ ]Oriented x   [x]Lethargic  [ ]Unarousable  [x]Verbal but hypophonic  [ ]Non-Verbal  Behavioral:   [ ] Anxiety  [ ] Delirium [ ] Agitation [x] Other - Calm  HEENT:  [ ]Normal   [x]Dry mouth   [ ]ET Tube/Trach  [ ]Oral lesions  PULMONARY:   [ ]Clear [ ]Tachypnea  [ ]Audible excessive secretions   [x]Rhonchi        [ ]Right [ ]Left [x]Bilateral  [x]Crackles        [ ]Right [ ]Left [x]Bilateral  [ ]Wheezing     [ ]Right [ ]Left [ ]Bilateral  [ ]Diminished BS [ ]Right [ ]Left [ ]Bilateral    CARDIOVASCULAR:    [x]Regular [ ]Irregular [ ]Tachy  [ ]Emery [ ]Murmur [ ]Other  GASTROINTESTINAL:  [x]Soft  [ ]Distended   [ ]+BS  [ ]Non tender [ ]Tender  [ ]Other [ ]PEG [ ]OGT/ NGT   Last BM:    GENITOURINARY:  [x]Normal [ ] Incontinent   [ ]Oliguria/Anuria   [ ]Vilchis  MUSCULOSKELETAL:   [ ]Normal   [x]Weakness  [x]Bed/Wheelchair bound [ ]Edema  NEUROLOGIC:   [x]No focal deficits  [ ] Cognitive impairment  [ ] Dysphagia [ ]Dysarthria [ ] Paresis [ ]Other   SKIN:   [x]Normal  [ ]Rash  [ ]Other  [ ]Pressure ulcer(s)  [ ]y [ ]n  Present on admission      CRITICAL CARE:  [ ] Shock Present  [ ]Septic [ ]Cardiogenic [ ]Neurologic [ ]Hypovolemic  [ ]  Vasopressors [ ]  Inotropes   [ ] Respiratory failure present [ ] Mechanical Ventilation [ ] Non-invasive ventilatory support [ ] High-Flow    [ ] Acute  [ ] Chronic [ ] Hypoxic  [ ] Hypercarbic [ ] Other  [ ] Other organ failure     LABS:            RADIOLOGY & ADDITIONAL STUDIES:    PROTEIN CALORIE MALNUTRITION: [ ] mild [ ] moderate [ ] severe  [ ] underweight [ ] morbid obesity    https://www.andeal.org/vault/2440/web/files/ONC/Table_Clinical%20Characteristics%20to%20Document%20Malnutrition-White%20JV%20et%20al%202012.pdf        [x] PPSV2 < or = 30% [ ] significant weight loss [ ] poor nutritional intake [ ] anasarca   Artificial Nutrition [ ]     Other REFERRALS:    [ ] Hospice  [ ]Child Life  [ ]Social Work  [ ]Case management [ ]Holistic Therapy [ ] Physical Therapy [ ] Dietary         Palliative Performance Scale:  http://[facility].org/files/news/palliative_performance_scale_ppsv2.pdf  (Ctrl +  left click to view)  Respiratory Distress Observation Tool:  https://homecareinformation.net/handouts/hen/Respiratory_Distress_Observation_Scale.pdf (Ctrl +  left click to view)  PAINAD Score:  http://geriatrictoolkit.[facility]/cog/painad.pdf (Ctrl +  left click to view)   GAP TEAM PALLIATIVE CARE UNIT PROGRESS NOTE:      [  ] Patient on hospice program.    INDICATION FOR PALLIATIVE CARE UNIT SERVICES/Interval HPI: 74 year old woman w/ PMHx of pancreatic cancer on chemotherapy, DVT on Eliquis 2.5mg BID, HTN, recent admission for symptomatic anemia requiring blood transfusion. Now presenting with SOB as well as melena s/p transfusion 11/11, found to have saddle PE and started on HFNC. Now transferred to PCU for comfort care.    OVERNIGHT EVENTS: No acute event overnight, seen sleeping comfortably, in no distress, complaints of some sore throat     DNR on chart:  DNI: Trial NIV      Allergies    No Known Allergies    Intolerances    MEDICATIONS  (STANDING):  Biotene Dry Mouth Oral Rinse 5 milliLiter(s) Swish and Spit four times a day  enoxaparin Injectable 50 milliGRAM(s) SubCutaneous <User Schedule>  pantoprazole  Injectable 40 milliGRAM(s) IV Push two times a day    MEDICATIONS  (PRN):  acetaminophen   IVPB .. 1000 milliGRAM(s) IV Intermittent once PRN Mild Pain (1 - 3)  acetaminophen  Suppository .. 650 milliGRAM(s) Rectal every 8 hours PRN Temp greater or equal to 38C (100.4F), Mild Pain (1 - 3)  benzocaine 20% Spray 1 Spray(s) Topical three times a day PRN sore throat  bisacodyl Suppository 10 milliGRAM(s) Rectal daily PRN Constipation  glycopyrrolate Injectable 0.4 milliGRAM(s) IV Push every 6 hours PRN secretions  morphine  - Injectable 1 milliGRAM(s) IV Push every 4 hours PRN Moderate Pain (4 - 6)  morphine  - Injectable 2 milliGRAM(s) IV Push every 4 hours PRN Severe Pain (7 - 10)  morphine  - Injectable 2 milliGRAM(s) IV Push every 4 hours PRN dyspnea    ITEMS UNCHECKED ARE NOT PRESENT    PRESENT SYMPTOMS: [ ]Unable to self-report see PAINAD, RDOS below  Source if other than patient:  [ ]Family   [ ]Team     Pain: [x] yes [ ] no  QOL impact -   Location - throat                   Aggravating factors - dry air from HFNC/BIPAP  Quality - Sore/Sharp  Radiation - Non radiating  Timing- Constant, waxing and waning  Severity (0-10 scale):  Minimal acceptable level (0-10 scale):       PCSSQ [Palliative Care Spiritual Screening Question]   Severity (0-10):  Score of 4 or > indicate consideration of Chaplaincy referral.  Chaplaincy Referral: [ ] yes [ ] refused [ ] following [ ] deferred    Caregiver [place]? : [ ] yes [x] no [ ] deferred:  Social work referral [ ] Patient & Family Centered Care Referral [ ]   Anticipatory Grief present?:  [ ] yes [x] no [ ] deferred:  Social work referral [ ] Patient & Family Centered Care Referral [ ]  	  Other Symptoms:  [x]All other review of systems negative    PHYSICAL EXAM:   Vital Signs Last 24 Hrs  T(C): 36.7 (19 Nov 2024 14:50), Max: 36.7 (19 Nov 2024 14:50)  T(F): 98.1 (19 Nov 2024 14:50), Max: 98.1 (19 Nov 2024 14:50)  HR: 72 (19 Nov 2024 22:00) (66 - 72)  BP: 137/70 (19 Nov 2024 14:50) (117/56 - 137/70)  BP(mean): --  RR: 16 (20 Nov 2024 10:12) (16 - 18)  SpO2: 95% (20 Nov 2024 10:12) (95% - 99%)    Parameters below as of 20 Nov 2024 10:12  Patient On (Oxygen Delivery Method): nasal cannula, high flow  O2 Flow (L/min): 50  O2 Concentration (%): 80 I&O's Summary    19 Nov 2024 07:01  -  20 Nov 2024 07:00  --------------------------------------------------------  IN: 240 mL / OUT: 100 mL / NET: 140 mL      GENERAL: [ ] Cachexia  [x]Alert  [ ]Oriented x   [x]Lethargic  [ ]Unarousable  [x]Verbal but hypophonic  [ ]Non-Verbal  Behavioral:   [ ] Anxiety  [ ] Delirium [ ] Agitation [x] Other - Calm  HEENT:  [ ]Normal   [x]Dry mouth   [ ]ET Tube/Trach  [ ]Oral lesions  PULMONARY:   [ ]Clear [ ]Tachypnea  [ ]Audible excessive secretions   [x]Rhonchi        [ ]Right [ ]Left [x]Bilateral  [x]Crackles        [ ]Right [ ]Left [x]Bilateral  [ ]Wheezing     [ ]Right [ ]Left [ ]Bilateral  [ ]Diminished BS [ ]Right [ ]Left [ ]Bilateral    CARDIOVASCULAR:    [x]Regular [ ]Irregular [ ]Tachy  [ ]Emery [ ]Murmur [ ]Other  GASTROINTESTINAL:  [x]Soft  [ ]Distended   [x ]+BS  [z ]Non tender [ ]Tender  [ ]Other [ ]PEG [ ]OGT/ NGT   Last BM:  11/20/2024  GENITOURINARY:  [x]Normal [ x] Incontinent   [ ]Oliguria/Anuria   [ ]Vilchis  MUSCULOSKELETAL:   [ ]Normal   [x]Weakness  [x]Bed/Wheelchair bound [ ]Edema  NEUROLOGIC:   [x]No focal deficits  [ ] Cognitive impairment  [ ] Dysphagia [ ]Dysarthria [ ] Paresis [ ]Other   SKIN:   [ ]Normal  [ ]Rash  [ ]Other DTI buttocks and right heel  [ ]Pressure ulcer(s)  [ ]y [ ]n  Present on admission      CRITICAL CARE:  [ ] Shock Present  [ ]Septic [ ]Cardiogenic [ ]Neurologic [ ]Hypovolemic  [ ]  Vasopressors [ ]  Inotropes   [x ] Respiratory failure present [ ] Mechanical Ventilation [ ] Non-invasive ventilatory support [ x] High-Flow    [ ] Acute  [ ] Chronic [x ] Hypoxic  [ ] Hypercarbic [ ] Other  [ ] Other organ failure     LABS:                          7.5    6.18  )-----------( 106      ( 18 Nov 2024 05:13 )             25.5   11-18    146[H]  |  112[H]  |  32[H]  ----------------------------<  115[H]  5.2   |  26  |  0.51          RADIOLOGY & ADDITIONAL STUDIES: < from: Xray Chest 1 View- PORTABLE-Urgent (Xray Chest 1 View- PORTABLE-Urgent .) (11.11.24 @ 21:50) >  IMPRESSION:    Bilateral patchy opacities, slightly greater within the left lowerlung.    --- End of Report ---           LAURA ALATORRE MD; Resident Radiologist  This document has been electronically signed.  LAZ EDWARDS MD; Attending Radiologist  This document has been electronically signed. Nov 12 2024 11:37AM    < end of copied text >  < from: CT Abdomen and Pelvis w/ IV Cont (11.11.24 @ 16:37) >  IMPRESSION:    Saddle pulmonary embolus. No evidence of right heart strain.    Diffuse symmetric groundglass and consolidation which is nonspecific.   Differential includes edema, hemorrhage, and infection.    Multilobulated cystic liver mass. Differential diagnosis includes   metastasis or abscess.    Diffuse colonic and small bowel wall thickening compatible with   enterocolitis.    1 cm nodular filling defect in the portal vein, suggestive of thrombus,   either tumor thrombus or bland thrombus.    Ascites.    This was discussed with Dr. Carrasco at 5:29 PM on 11/11/2024.    --- End of Report ---            ARELIS STONE M.D., ATTENDINGRADIOLOGIST  This document has been electronically signed. Nov 11 2024  5:31PM    < end of copied text >  < from: US Duplex Venous Lower Ext Complete, Bilateral (11.02.24 @ 15:08) >  IMPRESSION:    No evidence of deep venous thrombosis in either lower extremity.    Right calf vein small saphenous vein superficial venous thrombophlebitis.    Edema of the superficial soft tissues of the lower extremities. Correlate   clinically.    --- End of Report ---            DARRYN METZGER M.D., ATTENDING RADIOLOGIST  This document has been electronically signed. Nov 2 2024  4:01PM    < end of copied text >      PROTEIN CALORIE MALNUTRITION: [ ] mild [ ] moderate [ ] severe  [ ] underweight [ ] morbid obesity    https://www.andeal.org/vault/2440/web/files/ONC/Table_Clinical%20Characteristics%20to%20Document%20Malnutrition-White%20JV%20et%20al%686261.pdf        [x] PPSV2 < or = 30% [ ] significant weight loss [ ] poor nutritional intake [ ] anasarca   Artificial Nutrition [ ]     Other REFERRALS:    [ ] Hospice  [ ]Child Life  [ ]Social Work  [ ]Case management [ ]Holistic Therapy [ ] Physical Therapy [ ] Dietary                                 Care Coordination Assessment 201    COGNITIVE/LEARNING 201  Mental Status    Answers: Unable to assess    ADMISSION HISTORY 201  Admitted From    Answers: Acute Hospital    Functional Status Prior to Admission    Answers: Assistive equipment,  Answers: Assistive person    Services Present on Admission    Answers: DME; specify cane    Notes: Services on Admission, Contacts    Notes: CDPAP son and daughter in law for 35 hours a week through [employer]    FINANCIAL 201  Does patient have financial concerns for discharge?    Answers: None    LIVING ARRANGEMENTS/SUPPORT 201  Housing Environment    Answers: House    Living Arrangements    Answers: Lives with spouse, significant other,  Answers: Lives with family    Sources of support/caregivers    Answers: Spouse/Significant Other,  Answers: Children,  Answers: Other daughter in law and grandson    CAREGIVER CONTACT 201  Does the patient wish to identify a Caregiver?    Answers: Yes    Caregiver Contact    Answers: Caregiver Name: Shon,  Answers: Caregiver Contact Number: [phone]    EMERGENCY CONTACTS OUTSIDE HOME 201  Emergency Contact    Answers: Emergency Contact Name Idalia Solis,  Answers: Emergency Contact Phone # [phone],  Answers: Emergency Contact Relationship Grandson    Notes: Emergency Contact:    Notes: Jonathan Menendez [phone]    DISCHARGE PLANNING 201  Potential Discharge Plan and Services    Answers: Anticipated Needs Unclear at Present    SCREENING 201  Social Work Screen and Referral    Answers: Adjustment to Illness/Difficulty Coping    SUMMARY 201  Initial Clinical Summary    Notes: Social work reviewed patients chart via high-risk screening. Patient is  a 74year-old, Mandarin speaking female. As per H&P patient was transferred from  [facility] and admitted for a saddle PE. Patient has PMHX of anemia, HTN, and  pancreatic cancer.        Social work met patient, vianey Menendez, daughter in law Vinayak Solis and spouse  Thomas Solis at bedside to provide supportive intervention. Social work  introduced self and role and offered to use free interpretation services but  Shon stated he is fluent in English and would provide information. Shon  reported having clear communication with the medical team. Shon expressed they  are coping as expected. Patient is  with 2 adult children. Patient  resides in a house with her immediate family. Patient reports they have no  steps to enter. Prior to hospitalization patient had a DME of a cane/ Vik  reports himself and his wife were the CDPAP for the patient through [employer]  for 35 hours a week. Social work explored if patient has a HCP. Shon reported  the patient does not have a HCP. Social work educated patient on the [place]  State Healthcare Decision Act. Social work explained patient's ,  Bina would be the surrogate. Shon verbalized understanding and stated the  best phone number for the family is his son, Anjana jarquin. Shon stated the  pateints grandvianey Solis [phone] is her emergency contact.      Patient is covered by MEDICARE M[employer] CARE. Patient denied any financial,  cultural, or spiritual needs at this time. Shon denied any substance use  concerns in the patient but did report he had noticed the patient is depressed  since her diagnosis and is not connected to any mental health services nor  takes medications. Patients PCP is Berhane Mackay [phone]. Social work contact  information provided. Social work will continue to collaborate with  interdisciplinary team.    Anticipated Discharge Plan and Services    Notes: Discharge plans remain unclear due to ongoing hospital course. Social  work will remain available.       Electronically signed by:  Leticia Ferrari  Electronically signed on:  2024-11-12  14:39      Palliative Performance Scale:  http://npcrc.org/files/news/palliative_performance_scale_ppsv2.pdf  (Ctrl +  left click to view)  Respiratory Distress Observation Tool:  https://homecareinformation.net/handouts/hen/Respiratory_Distress_Observation_Scale.pdf (Ctrl +  left click to view)  PAINAD Score:  http://geriatrictoolkit.[place]/cog/painad.pdf (Ctrl +  left click to view)

## 2024-11-20 NOTE — PROGRESS NOTE ADULT - PROBLEM SELECTOR PLAN 6
Patient will be transfer to PCU  Discussed with primary team     Jane Epstein MD   Geriatrics and Palliative Care Attending   [facility]     Please contact me via Teams from Monday through Friday between 9am-5pm. If not answering, please call the palliative care pager [phone]    After 5pm and on weekends, please see the contact information below:    In the event of newly developing, evolving, or worsening symptoms, please contact the Palliative Medicine team via pager (if the patient is at [facility] #[phone] or if the patient is at [facility] #[phone]) The Geriatric and Palliative Medicine service has coverage 24 hours a day/ 7 days a week to provide medical recommendations regarding symptom management needs via telephone.

## 2024-11-20 NOTE — PROGRESS NOTE ADULT - PROBLEM SELECTOR PLAN 4
Patient this morning reported throat pain  - start morphine  1 mg IVP q4h prn moderate pain  - start morphine 2 mg IVP q4h prn severe pain  - Bowel regimen while on opioids.  Monitor for constipation. Patient this morning reported throat pain  - Benzocaine spray TID PRN for sore throat  - start morphine  1 mg IVP q4h prn moderate pain (Required 0 PRNs over the last 24h)  - start morphine 2 mg IVP q4h prn severe pain (Required 0 PRNs over the last 24h)  - Monitor constipation while on opioid

## 2024-11-20 NOTE — PROGRESS NOTE ADULT - PROBLEM SELECTOR PLAN 1
CT Angio Chest PE Protocol w/ IV Cont (11.11.24 @ 16:36) Saddle pulmonary embolus. No evidence of right heart strain. Diffuse symmetric ground glass and consolidation which is nonspecific. Differential includes edema, hemorrhage, and infection. Multilobulated cystic liver mass. Differential diagnosis includes  metastasis or abscess.  Diffuse colonic and small bowel wall thickening compatible with enterocolitis. 1 cm nodular filling defect in the portal vein, suggestive of thrombus,  either tumor thrombus or bland thrombus.  - Family would like to continue AC.   - symptom directed approach. CT Angio Chest PE Protocol w/ IV Cont (11.11.24 @ 16:36) Saddle pulmonary embolus. No evidence of right heart strain. Diffuse symmetric ground glass and consolidation which is nonspecific. Differential includes edema, hemorrhage, and infection. Multilobulated cystic liver mass. Differential diagnosis includes  metastasis or abscess.  Diffuse colonic and small bowel wall thickening compatible with enterocolitis. 1 cm nodular filling defect in the portal vein, suggestive of thrombus,  either tumor thrombus or bland thrombus.    - On Lovenox 40mg BID  - On HFNC, will consider to wean as able  - Symptom directed approach CT Angio Chest PE Protocol w/ IV Cont (11.11.24 @ 16:36) Saddle pulmonary embolus. No evidence of right heart strain. Diffuse symmetric ground glass and consolidation which is nonspecific. Differential includes edema, hemorrhage, and infection. Multilobulated cystic liver mass. Differential diagnosis includes  metastasis or abscess.  Diffuse colonic and small bowel wall thickening compatible with enterocolitis. 1 cm nodular filling defect in the portal vein, suggestive of thrombus,  either tumor thrombus or bland thrombus.    - On Lovenox 40mg BID  - On HFNC, wean as per primary team  - Symptom directed approach

## 2024-11-21 NOTE — PROGRESS NOTE ADULT - ASSESSMENT
75 y/o F with PMH of pancreatic cancer on chemotherapy, DVT on Eliquis 2.5mg BID, HTN, recent admission for symptomatic anemia requiring blood transfusion presenting with SOB as well as dark stools found to have a PE.     Pancreatic Cancer   - on gem/abraxane (LD on 10/18/24)  - follows Dr. Kalpesh PLASENCIA   - no plans for inpatient treatment, not currently a treatment candidate    Severe anemia, dark stool  - Recently at [place], received pRBC transfusions and was planned for outpatient EGD  - Received Aranesp 200 mcg LD 11/1.   - GI following, Outpatient EGD and colonoscopy once more medically stable and optimized   - Iron studies were low 11/1: iron 8, TIBC 162, % sat 5, ferritin 396; consider iron supplementation  - Recommend transfusion for hgb < 7    B/l DVT, new PE  - DVT diagnosed on 10/28  - started on eliquis which was held then dose reduced to 2.5 mg on recent admission   - CTA CAP: Saddle pulmonary embolus. No evidence of right heart strain. Diffuse symmetric groundglass and consolidation which is nonspecific. Multilobulated cystic liver mass. Differential diagnosis includes metastasis or abscess. Diffuse colonic and small bowel wall thickening compatible with enterocolitis. 1 cm nodular filling defect in the portal vein, suggestive of thrombus, either tumor thrombus or bland thrombus.  - Vascular following, no mechanical thrombectomy - not a candidate for advanced therapies given underlying co-morbidities, anemia and frailty  - Per GI, Benefits of A/C with massive PE outweighs risks as she has no active GI bleeding.   - now on Lovenox 50 BID  - Pt now DNR/DNI. Palliative care team following, in PCU for symptomatic management. Continues Lovenox     will continue to follow.     BALJIT Ramos-C  Hematology/Oncology  [facility]  [phone] (office)

## 2024-11-21 NOTE — PROGRESS NOTE ADULT - SUBJECTIVE AND OBJECTIVE BOX
Patient is a 74y old  Female who presents with a chief complaint of saddle PE (21 Nov 2024 10:47)    Patient seen and examined at bedside, resting comfortably.    MEDICATIONS  (STANDING):  Biotene Dry Mouth Oral Rinse 5 milliLiter(s) Swish and Spit four times a day  enoxaparin Injectable 50 milliGRAM(s) SubCutaneous <User Schedule>  pantoprazole  Injectable 40 milliGRAM(s) IV Push two times a day    MEDICATIONS  (PRN):  acetaminophen   IVPB .. 1000 milliGRAM(s) IV Intermittent once PRN Mild Pain (1 - 3)  acetaminophen  Suppository .. 650 milliGRAM(s) Rectal every 8 hours PRN Temp greater or equal to 38C (100.4F), Mild Pain (1 - 3)  benzocaine 20% Spray 1 Spray(s) Topical three times a day PRN sore throat  bisacodyl Suppository 10 milliGRAM(s) Rectal daily PRN Constipation  glycopyrrolate Injectable 0.4 milliGRAM(s) IV Push every 6 hours PRN secretions  morphine  - Injectable 2 milliGRAM(s) IV Push every 4 hours PRN dyspnea  morphine  - Injectable 1 milliGRAM(s) IV Push every 4 hours PRN Moderate Pain (4 - 6)  morphine  - Injectable 2 milliGRAM(s) IV Push every 4 hours PRN Severe Pain (7 - 10)    Vital Signs Last 24 Hrs  T(C): 36.7 (21 Nov 2024 08:03), Max: 36.7 (21 Nov 2024 08:03)  T(F): 98.1 (21 Nov 2024 08:03), Max: 98.1 (21 Nov 2024 08:03)  HR: 84 (21 Nov 2024 14:58) (76 - 88)  BP: 114/72 (21 Nov 2024 08:03) (114/72 - 127/67)  BP(mean): --  RR: 20 (21 Nov 2024 14:58) (16 - 20)  SpO2: 94% (21 Nov 2024 14:58) (94% - 97%)    Parameters below as of 21 Nov 2024 14:58  Patient On (Oxygen Delivery Method): nasal cannula, high flow  O2 Flow (L/min): 40  O2 Concentration (%): 60    PE  NAD  Awake, alert  Anicteric, MMM  No c/c/e  No rash grossly

## 2024-11-21 NOTE — PROGRESS NOTE ADULT - SUBJECTIVE AND OBJECTIVE BOX
GAP TEAM PALLIATIVE CARE UNIT PROGRESS NOTE:      [  ] Patient on hospice program.    INDICATION FOR PALLIATIVE CARE UNIT SERVICES/Interval HPI: 74 year old woman w/ PMHx of pancreatic cancer on chemotherapy, DVT on Eliquis 2.5mg BID, HTN, recent admission for symptomatic anemia requiring blood transfusion. Now presenting with SOB as well as melena s/p transfusion 11/11, found to have saddle PE and started on HFNC. Patient on the PCU for comfort measures only.       OVERNIGHT EVENTS: Patient seen and examined at the bedside. Chart reviewed. Patient experienced no acute events overnight, in no distress. Patient did not use any PRN medications over night.     DNR on chart: DNI: Trial NIV  DNI: Trial NIV      Allergies    No Known Allergies    Intolerances    MEDICATIONS  (STANDING):  Biotene Dry Mouth Oral Rinse 5 milliLiter(s) Swish and Spit four times a day  enoxaparin Injectable 50 milliGRAM(s) SubCutaneous <User Schedule>  pantoprazole  Injectable 40 milliGRAM(s) IV Push two times a day    MEDICATIONS  (PRN):  acetaminophen   IVPB .. 1000 milliGRAM(s) IV Intermittent once PRN Mild Pain (1 - 3)  acetaminophen  Suppository .. 650 milliGRAM(s) Rectal every 8 hours PRN Temp greater or equal to 38C (100.4F), Mild Pain (1 - 3)  benzocaine 20% Spray 1 Spray(s) Topical three times a day PRN sore throat  bisacodyl Suppository 10 milliGRAM(s) Rectal daily PRN Constipation  glycopyrrolate Injectable 0.4 milliGRAM(s) IV Push every 6 hours PRN secretions  morphine  - Injectable 2 milliGRAM(s) IV Push every 4 hours PRN dyspnea  morphine  - Injectable 1 milliGRAM(s) IV Push every 4 hours PRN Moderate Pain (4 - 6)  morphine  - Injectable 2 milliGRAM(s) IV Push every 4 hours PRN Severe Pain (7 - 10)    ITEMS UNCHECKED ARE NOT PRESENT    PRESENT SYMPTOMS: [ ]Unable to self-report see PAINAD, RDOS below  Source if other than patient:  [ ]Family   [ ]Team     Pain: [ ] yes [ ] no  QOL impact -   Location -                    Aggravating factors -  Quality -  Radiation -  Timing-  Severity (0-10 scale):  Minimal acceptable level (0-10 scale):     Dyspnea:                           [ ]Mild [ ]Moderate [ ]Severe  Anxiety:                             [ ]Mild [ ]Moderate [ ]Severe  Fatigue:                             [ ]Mild [ ]Moderate [ ]Severe  Nausea:                             [ ]Mild [ ]Moderate [ ]Severe  Loss of appetite:              [ ]Mild [ ]Moderate [ ]Severe  Constipation:                    [ ]Mild [ ]Moderate [ ]Severe    PCSSQ [Palliative Care Spiritual Screening Question]   Severity (0-10):  Score of 4 or > indicate consideration of Chaplaincy referral.  Chaplaincy Referral: [ ] yes [ ] refused [ ] following [ ] deferred    Caregiver [place]? : [ ] yes [x ] no [ ] deferred:  Social work referral [ ] Patient & Family Centered Care Referral [ ]   Anticipatory Grief present?:  [ ] yes [x ] no [ ] deferred:  Social work referral [ ] Patient & Family Centered Care Referral [ ]  	  Other Symptoms:  [ x]All other review of systems negative     PHYSICAL EXAM:   Vital Signs Last 24 Hrs  T(C): 36.7 (21 Nov 2024 08:03), Max: 36.7 (21 Nov 2024 08:03)  T(F): 98.1 (21 Nov 2024 08:03), Max: 98.1 (21 Nov 2024 08:03)  HR: 81 (21 Nov 2024 08:55) (76 - 81)  BP: 114/72 (21 Nov 2024 08:03) (114/72 - 127/67)  BP(mean): --  RR: 18 (21 Nov 2024 08:55) (16 - 18)  SpO2: 97% (21 Nov 2024 08:55) (96% - 97%)    Parameters below as of 21 Nov 2024 08:55  Patient On (Oxygen Delivery Method): nasal cannula, high flow, heater temp 31  O2 Flow (L/min): 50  O2 Concentration (%): 80 I&O's Summary    GENERAL: [ ] Cachexia  [x ]Alert  [ ]Oriented x   [x ]Lethargic  [ ]Unarousable  [x ]Verbal  [ ]Non-Verbal  Behavioral:   [ ] Anxiety  [ ] Delirium [ ] Agitation [x ] Other - resting comfortably  HEENT:  [ ]Normal   [ x]Dry mouth   [ ]ET Tube/Trach  [ ]Oral lesions  PULMONARY:   [ ]Clear [ ]Tachypnea  [ ]Audible excessive secretions   [ x]Rhonchi        [ ]Right [ ]Left [x ]Bilateral  [x ]Crackles        [ ]Right [ ]Left [ x]Bilateral  [ ]Wheezing     [ ]Right [ ]Left [ ]Bilateral  [ ]Diminished BS [ ]Right [ ]Left [ ]Bilateral    CARDIOVASCULAR:    [x ]Regular [ ]Irregular [ ]Tachy  [ ]Emery [ ]Murmur [ ]Other  GASTROINTESTINAL:  [x ]Soft  [ ]Distended   [x ]+BS  [x ]Non tender [ ]Tender  [ ]Other [ ]PEG [ ]OGT/ NGT   Last BM:  11/20  GENITOURINARY:  [ ]Normal [ x] Incontinent   [ ]Oliguria/Anuria   [ ]Vilchis  MUSCULOSKELETAL:   [ ]Normal   [ x]Weakness  [x ]Bed/Wheelchair bound [ ]Edema  NEUROLOGIC:   [x ]No focal deficits  [ ] Cognitive impairment  [ ] Dysphagia [ ]Dysarthria [ ] Paresis [ ]Other   SKIN:   [ ]Normal  [ ]Rash  [x ]Other DTI buttocks and right heel. Please see nursing documentation which I have reviewed   [ ]Pressure ulcer(s)  [ ]y [ ]n  Present on admission      CRITICAL CARE:  [ ] Shock Present  [ ]Septic [ ]Cardiogenic [ ]Neurologic [ ]Hypovolemic  [ ]  Vasopressors [ ]  Inotropes   [ x] Respiratory failure present [ ] Mechanical Ventilation [ ] Non-invasive ventilatory support [x ] High-Flow  [ ] Acute  [ ] Chronic [ ] Hypoxic  [ ] Hypercarbic [ ] Other  [ ] Other organ failure     LABS: none new  RADIOLOGY & ADDITIONAL STUDIES: none new     PROTEIN CALORIE MALNUTRITION: [ ] mild [ ] moderate [ ] severe  [ ] underweight [ ] morbid obesity    https://www.andeal.org/vault/2440/web/files/ONC/Table_Clinical%20Characteristics%20to%20Document%20Malnutrition-White%20JV%20et%20al%202012.pdf    Height (cm): 152.4 (11-11-24 @ 21:00), 149.9 (11-11-24 @ 13:29), 149.9 (11-01-24 @ 10:52)  Weight (kg): 51.9 (11-11-24 @ 21:00), 49 (11-11-24 @ 13:29), 47.6 (11-01-24 @ 10:52)  BMI (kg/m2): 22.3 (11-11-24 @ 21:00), 21.8 (11-11-24 @ 13:29), 21.2 (11-01-24 @ 10:52)    [x ] PPSV2 < or = 30% [ ] significant weight loss [ ] poor nutritional intake [ ] anasarca   Artificial Nutrition [ ]     Other REFERRALS:    [ ] Hospice  [ ]Child Life  [ x]Social Work  [ ]Case management [ ]Holistic Therapy [ ] Physical Therapy [ ] Dietary     Progress Notes - Care Coordination [C. Provider] (11-19-24 @ 11:07)      Palliative Performance Scale:  http://npcrc.org/files/news/palliative_performance_scale_ppsv2.pdf  (Ctrl +  left click to view)  Respiratory Distress Observation Tool:  https://homecareinformation.net/handouts/hen/Respiratory_Distress_Observation_Scale.pdf (Ctrl +  left click to view)  PAINAD Score:  http://[email or web address].[facility]/cog/painad.pdf (Ctrl +  left click to view)   GAP TEAM PALLIATIVE CARE UNIT PROGRESS NOTE:      [  ] Patient on hospice program.    INDICATION FOR PALLIATIVE CARE UNIT SERVICES/Interval HPI: 74 year old woman w/ PMHx of pancreatic cancer on chemotherapy, DVT on Eliquis 2.5mg BID, HTN, recent admission for symptomatic anemia requiring blood transfusion. Now presenting with SOB as well as melena s/p transfusion 11/11, found to have saddle PE and started on HFNC. Patient on the PCU for comfort measures only.       OVERNIGHT EVENTS: Patient seen and examined at the bedside. Chart reviewed. Patient experienced no acute events overnight, in no distress. Patient did not use any PRN medications over night.     DNR on chart: DNI: Trial NIV  DNI: Trial NIV      Allergies    No Known Allergies    Intolerances    MEDICATIONS  (STANDING):  Biotene Dry Mouth Oral Rinse 5 milliLiter(s) Swish and Spit four times a day  enoxaparin Injectable 50 milliGRAM(s) SubCutaneous <User Schedule>  pantoprazole  Injectable 40 milliGRAM(s) IV Push two times a day    MEDICATIONS  (PRN):  acetaminophen   IVPB .. 1000 milliGRAM(s) IV Intermittent once PRN Mild Pain (1 - 3)  acetaminophen  Suppository .. 650 milliGRAM(s) Rectal every 8 hours PRN Temp greater or equal to 38C (100.4F), Mild Pain (1 - 3)  benzocaine 20% Spray 1 Spray(s) Topical three times a day PRN sore throat  bisacodyl Suppository 10 milliGRAM(s) Rectal daily PRN Constipation  glycopyrrolate Injectable 0.4 milliGRAM(s) IV Push every 6 hours PRN secretions  morphine  - Injectable 2 milliGRAM(s) IV Push every 4 hours PRN dyspnea  morphine  - Injectable 1 milliGRAM(s) IV Push every 4 hours PRN Moderate Pain (4 - 6)  morphine  - Injectable 2 milliGRAM(s) IV Push every 4 hours PRN Severe Pain (7 - 10)    ITEMS UNCHECKED ARE NOT PRESENT    PRESENT SYMPTOMS: [x ]Unable to self-report see PAINAD, RDOS below  Source if other than patient:  [ ]Family   [ ]Team     Pain: [ ] yes [ ] no  QOL impact -   Location -                    Aggravating factors -  Quality -  Radiation -  Timing-  Severity (0-10 scale):  Minimal acceptable level (0-10 scale):     Dyspnea:                           [ ]Mild [ ]Moderate [ ]Severe  Anxiety:                             [ ]Mild [ ]Moderate [ ]Severe  Fatigue:                             [ ]Mild [ ]Moderate [ ]Severe  Nausea:                             [ ]Mild [ ]Moderate [ ]Severe  Loss of appetite:              [ ]Mild [ ]Moderate [ ]Severe  Constipation:                    [ ]Mild [ ]Moderate [ ]Severe    PCSSQ [Palliative Care Spiritual Screening Question]   Severity (0-10):  Score of 4 or > indicate consideration of Chaplaincy referral.  Chaplaincy Referral: [ ] yes [ ] refused [ ] following [ ] deferred    Caregiver [place]? : [ ] yes [x ] no [ ] deferred:  Social work referral [ ] Patient & Family Centered Care Referral [ ]   Anticipatory Grief present?:  [ ] yes [x ] no [ ] deferred:  Social work referral [ ] Patient & Family Centered Care Referral [ ]  	  Other Symptoms:  [ x]All other review of systems negative     PHYSICAL EXAM:   Vital Signs Last 24 Hrs  T(C): 36.7 (21 Nov 2024 08:03), Max: 36.7 (21 Nov 2024 08:03)  T(F): 98.1 (21 Nov 2024 08:03), Max: 98.1 (21 Nov 2024 08:03)  HR: 81 (21 Nov 2024 08:55) (76 - 81)  BP: 114/72 (21 Nov 2024 08:03) (114/72 - 127/67)  BP(mean): --  RR: 18 (21 Nov 2024 08:55) (16 - 18)  SpO2: 97% (21 Nov 2024 08:55) (96% - 97%)    Parameters below as of 21 Nov 2024 08:55  Patient On (Oxygen Delivery Method): nasal cannula, high flow, heater temp 31  O2 Flow (L/min): 50  O2 Concentration (%): 80 I&O's Summary    GENERAL: [ x] Cachexia  [x ]Alert  [ ]Oriented x   [x ]Lethargic  [ ]Unarousable  [x ]Verbal  [ ]Non-Verbal  Behavioral:   [ ] Anxiety  [ ] Delirium [ ] Agitation [x ] Other - resting comfortably  HEENT:  [ ]Normal   [ x]Dry mouth   [ ]ET Tube/Trach  [ ]Oral lesions  PULMONARY:   [ ]Clear [ ]Tachypnea  [ ]Audible excessive secretions   [ x]Rhonchi        [ ]Right [ ]Left [x ]Bilateral  [x ]Crackles        [ ]Right [ ]Left [ x]Bilateral  [ ]Wheezing     [ ]Right [ ]Left [ ]Bilateral  [ ]Diminished BS [ ]Right [ ]Left [ ]Bilateral    CARDIOVASCULAR:    [x ]Regular [ ]Irregular [ ]Tachy  [ ]Emery [ ]Murmur [ ]Other  GASTROINTESTINAL:  [x ]Soft  [ ]Distended   [x ]+BS  [x ]Non tender [ ]Tender  [ ]Other [ ]PEG [ ]OGT/ NGT   Last BM:  11/20  GENITOURINARY:  [ ]Normal [ x] Incontinent   [ ]Oliguria/Anuria   [ ]Vilchis  MUSCULOSKELETAL:   [ ]Normal   [ x]Weakness  [x ]Bed/Wheelchair bound [ ]Edema  NEUROLOGIC:   [x ]No focal deficits  [ ] Cognitive impairment  [ ] Dysphagia [ ]Dysarthria [ ] Paresis [ ]Other   SKIN:   [ ]Normal  [ ]Rash  [x ]Other DTI buttocks and right heel. Please see nursing documentation which I have reviewed   [ ]Pressure ulcer(s)  [ ]y [ ]n  Present on admission      CRITICAL CARE:  [ ] Shock Present  [ ]Septic [ ]Cardiogenic [ ]Neurologic [ ]Hypovolemic  [ ]  Vasopressors [ ]  Inotropes   [ x] Respiratory failure present [ ] Mechanical Ventilation [ ] Non-invasive ventilatory support [x ] High-Flow  [ ] Acute  [ ] Chronic [ ] Hypoxic  [ ] Hypercarbic [ ] Other  [ ] Other organ failure     LABS: none new  RADIOLOGY & ADDITIONAL STUDIES: none new     PROTEIN CALORIE MALNUTRITION: [ ] mild [ ] moderate [ ] severe  [ ] underweight [ ] morbid obesity    https://www.andeal.org/vault/0530/web/files/ONC/Table_Clinical%20Characteristics%20to%20Document%20Malnutrition-White%20JV%20et%20al%202012.pdf    Height (cm): 152.4 (11-11-24 @ 21:00), 149.9 (11-11-24 @ 13:29), 149.9 (11-01-24 @ 10:52)  Weight (kg): 51.9 (11-11-24 @ 21:00), 49 (11-11-24 @ 13:29), 47.6 (11-01-24 @ 10:52)  BMI (kg/m2): 22.3 (11-11-24 @ 21:00), 21.8 (11-11-24 @ 13:29), 21.2 (11-01-24 @ 10:52)    [x ] PPSV2 < or = 30% [ ] significant weight loss [ ] poor nutritional intake [ ] anasarca   Artificial Nutrition [ ]     Other REFERRALS:    [ ] Hospice  [ ]Child Life  [ x]Social Work  [ ]Case management [ ]Holistic Therapy [ ] Physical Therapy [ ] Dietary     Progress Notes - Care Coordination [C. Provider] (11-19-24 @ 11:07)      Palliative Performance Scale:  http://npcrc.org/files/news/palliative_performance_scale_ppsv2.pdf  (Ctrl +  left click to view)  Respiratory Distress Observation Tool:  https://homecareinformation.net/handouts/hen/Respiratory_Distress_Observation_Scale.pdf (Ctrl +  left click to view)  PAINAD Score:  http://[email or web address].[facility]/cog/painad.pdf (Ctrl +  left click to view)   GAP TEAM PALLIATIVE CARE UNIT PROGRESS NOTE:      [  ] Patient on hospice program.    INDICATION FOR PALLIATIVE CARE UNIT SERVICES/Interval HPI: 74 year old woman w/ PMHx of pancreatic cancer on chemotherapy, DVT on Eliquis 2.5mg BID, HTN, recent admission for symptomatic anemia requiring blood transfusion. Now presenting with SOB as well as melena s/p transfusion 11/11, found to have saddle PE and started on HFNC. Patient on the PCU for comfort measures only.       OVERNIGHT EVENTS: Patient seen and examined at the bedside. Chart reviewed. Patient experienced no acute events overnight, in no distress. Patient did not use any PRN medications over night.     DNR on chart: yes  DNI: Trial NIV      Allergies    No Known Allergies    Intolerances    MEDICATIONS  (STANDING):  Biotene Dry Mouth Oral Rinse 5 milliLiter(s) Swish and Spit four times a day  enoxaparin Injectable 50 milliGRAM(s) SubCutaneous <User Schedule>  pantoprazole  Injectable 40 milliGRAM(s) IV Push two times a day    MEDICATIONS  (PRN):  acetaminophen   IVPB .. 1000 milliGRAM(s) IV Intermittent once PRN Mild Pain (1 - 3)  acetaminophen  Suppository .. 650 milliGRAM(s) Rectal every 8 hours PRN Temp greater or equal to 38C (100.4F), Mild Pain (1 - 3)  benzocaine 20% Spray 1 Spray(s) Topical three times a day PRN sore throat  bisacodyl Suppository 10 milliGRAM(s) Rectal daily PRN Constipation  glycopyrrolate Injectable 0.4 milliGRAM(s) IV Push every 6 hours PRN secretions  morphine  - Injectable 2 milliGRAM(s) IV Push every 4 hours PRN dyspnea  morphine  - Injectable 1 milliGRAM(s) IV Push every 4 hours PRN Moderate Pain (4 - 6)  morphine  - Injectable 2 milliGRAM(s) IV Push every 4 hours PRN Severe Pain (7 - 10)    ITEMS UNCHECKED ARE NOT PRESENT    PRESENT SYMPTOMS: [x ]Unable to self-report see PAINAD, RDOS below  Source if other than patient:  [ ]Family   [ ]Team     Pain: [ ] yes [ ] no  QOL impact -   Location -                    Aggravating factors -  Quality -  Radiation -  Timing-  Severity (0-10 scale):  Minimal acceptable level (0-10 scale):     Dyspnea:                           [ ]Mild [ ]Moderate [ ]Severe  Anxiety:                             [ ]Mild [ ]Moderate [ ]Severe  Fatigue:                             [ ]Mild [ ]Moderate [ ]Severe  Nausea:                             [ ]Mild [ ]Moderate [ ]Severe  Loss of appetite:              [ ]Mild [ ]Moderate [ ]Severe  Constipation:                    [ ]Mild [ ]Moderate [ ]Severe    PCSSQ [Palliative Care Spiritual Screening Question]   Severity (0-10):  Score of 4 or > indicate consideration of Chaplaincy referral.  Chaplaincy Referral: [ ] yes [ ] refused [ ] following [ ] deferred    Caregiver [place]? : [ ] yes [x ] no [ ] deferred:  Social work referral [ ] Patient & Family Centered Care Referral [ ]   Anticipatory Grief present?:  [ ] yes [x ] no [ ] deferred:  Social work referral [ ] Patient & Family Centered Care Referral [ ]  	  Other Symptoms:  [ x]All other review of systems negative     PHYSICAL EXAM:   Vital Signs Last 24 Hrs  T(C): 36.7 (21 Nov 2024 08:03), Max: 36.7 (21 Nov 2024 08:03)  T(F): 98.1 (21 Nov 2024 08:03), Max: 98.1 (21 Nov 2024 08:03)  HR: 81 (21 Nov 2024 08:55) (76 - 81)  BP: 114/72 (21 Nov 2024 08:03) (114/72 - 127/67)  BP(mean): --  RR: 18 (21 Nov 2024 08:55) (16 - 18)  SpO2: 97% (21 Nov 2024 08:55) (96% - 97%)    Parameters below as of 21 Nov 2024 08:55  Patient On (Oxygen Delivery Method): nasal cannula, high flow, heater temp 31  O2 Flow (L/min): 50  O2 Concentration (%): 80 I&O's Summary    GENERAL: [ x] Cachexia  [x ]Alert  [ ]Oriented x   [x ]Lethargic  [ ]Unarousable  [x ]Verbal  [ ]Non-Verbal  Behavioral:   [ ] Anxiety  [ ] Delirium [ ] Agitation [x ] Other - resting comfortably  HEENT:  [ ]Normal   [ x]Dry mouth   [ ]ET Tube/Trach  [ ]Oral lesions  PULMONARY:   [ ]Clear [ ]Tachypnea  [ ]Audible excessive secretions   [ x]Rhonchi        [ ]Right [ ]Left [x ]Bilateral  [x ]Crackles        [ ]Right [ ]Left [ x]Bilateral  [ ]Wheezing     [ ]Right [ ]Left [ ]Bilateral  [ ]Diminished BS [ ]Right [ ]Left [ ]Bilateral    CARDIOVASCULAR:    [x ]Regular [ ]Irregular [ ]Tachy  [ ]Emery [ ]Murmur [ ]Other  GASTROINTESTINAL:  [x ]Soft  [ ]Distended   [x ]+BS  [x ]Non tender [ ]Tender  [ ]Other [ ]PEG [ ]OGT/ NGT   Last BM:  11/20  GENITOURINARY:  [ ]Normal [ x] Incontinent   [ ]Oliguria/Anuria   [ ]Vilchis  MUSCULOSKELETAL:   [ ]Normal   [ x]Weakness  [x ]Bed/Wheelchair bound [ ]Edema  NEUROLOGIC:   [x ]No focal deficits  [ ] Cognitive impairment  [ ] Dysphagia [ ]Dysarthria [ ] Paresis [ ]Other   SKIN:   [ ]Normal  [ ]Rash  [x ]Other DTI buttocks and right heel. Please see nursing documentation which I have reviewed   [ ]Pressure ulcer(s)  [ ]y [ ]n  Present on admission      CRITICAL CARE:  [ ] Shock Present  [ ]Septic [ ]Cardiogenic [ ]Neurologic [ ]Hypovolemic  [ ]  Vasopressors [ ]  Inotropes   [ x] Respiratory failure present [ ] Mechanical Ventilation [ ] Non-invasive ventilatory support [x ] High-Flow  [ ] Acute  [ ] Chronic [ ] Hypoxic  [ ] Hypercarbic [ ] Other  [ ] Other organ failure     LABS: none new  RADIOLOGY & ADDITIONAL STUDIES: none new     PROTEIN CALORIE MALNUTRITION: [ ] mild [ ] moderate [ ] severe  [ ] underweight [ ] morbid obesity    https://www.andeal.org/vault/2440/web/files/ONC/Table_Clinical%20Characteristics%20to%20Document%20Malnutrition-White%20JV%20et%20al%202012.pdf    Height (cm): 152.4 (11-11-24 @ 21:00), 149.9 (11-11-24 @ 13:29), 149.9 (11-01-24 @ 10:52)  Weight (kg): 51.9 (11-11-24 @ 21:00), 49 (11-11-24 @ 13:29), 47.6 (11-01-24 @ 10:52)  BMI (kg/m2): 22.3 (11-11-24 @ 21:00), 21.8 (11-11-24 @ 13:29), 21.2 (11-01-24 @ 10:52)    [x ] PPSV2 < or = 30% [ ] significant weight loss [ ] poor nutritional intake [ ] anasarca   Artificial Nutrition [ ]     Other REFERRALS:    [ ] Hospice  [ ]Child Life  [ x]Social Work  [ ]Case management [ ]Holistic Therapy [ ] Physical Therapy [ ] Dietary     Progress Notes - Care Coordination [C. Provider] (11-19-24 @ 11:07)      Palliative Performance Scale:  http://npcrc.org/files/news/palliative_performance_scale_ppsv2.pdf  (Ctrl +  left click to view)  Respiratory Distress Observation Tool:  https://homecareinformation.net/handouts/hen/Respiratory_Distress_Observation_Scale.pdf (Ctrl +  left click to view)  PAINAD Score:  http://[email or web address].[email or web address].[facility]/cog/painad.pdf (Ctrl +  left click to view)

## 2024-11-21 NOTE — PROGRESS NOTE ADULT - PROBLEM SELECTOR PLAN 4
- Benzocaine spray TID PRN for sore throat  - start morphine  1 mg IVP q4h prn moderate pain (Required 0 PRNs over the last 24h)  - start morphine 2 mg IVP q4h prn severe pain (Required 0 PRNs over the last 24h)  - Monitor constipation while on opioid.

## 2024-11-21 NOTE — PROGRESS NOTE ADULT - ASSESSMENT
74 year old woman w/ PMHx of pancreatic cancer on chemotherapy, DVT on Eliquis 2.5mg BID, HTN, recent admission for symptomatic anemia requiring blood transfusion. Now presenting with SOB as well as melena s/p transfusion 11/11, found to have saddle PE and started on HFNC. GaP team consulted for complex medical decision making in the setting of serious illness and symptoms management. In PCU for comfort care and symptom management

## 2024-11-21 NOTE — PROGRESS NOTE ADULT - PROBLEM SELECTOR PLAN 3
- on BIPAP at night  - HFNC during the day, change from 80% to 60% 11/21 - on BIPAP at night  - HFNC during the day, change from 80% to 60% 11/21/24 - on BIPAP at night  - HFNC during the day, change from 80% to 60% 11/21/24    No prn's required

## 2024-11-21 NOTE — PROGRESS NOTE ADULT - PROBLEM SELECTOR PLAN 1
- Saddle pulmonary embolus  - On Lovenox 40mg BID  - On HFNC - 11/21 decreased to 60%  - Symptom directed approach

## 2024-11-21 NOTE — PROGRESS NOTE ADULT - NS ATTEND AMEND GEN_ALL_CORE FT
74 year old woman w/ PMHx of pancreatic cancer on chemotherapy, DVT on Eliquis 2.5mg BID, HTN, recent admission for symptomatic anemia requiring blood transfusion. Now presenting with SOB as well as melena s/p transfusion 11/11, found to have saddle PE and started on HFNC.   Patient transferred to PCU for management of sx and disposition planning.  Patient presently asymptomatic for pain/dyspnea/agitation.    No PRNs required for sx  Continue Lovenox for saddle pulmonary embolism.  Slight vaginal bleeding.  Continue to monitor.      Family at bedside, updated as to current clinical condition and plan of care.  Educated as to what to expect, questions answered and emotional support provided.    Weaning high flow to 60% (94% after) and 40 LPM - pending new saturation.    Patient assessment and plan discussed on interdisciplinary team rounds today.

## 2024-11-22 NOTE — PROGRESS NOTE ADULT - TIME BILLING
This includes chart review, patient assessment, discussion and collaboration with interdisciplinary team members, excluding ACP.  I have reviewed all documentation from prior primary team and consultants, as well as relevant imaging and laboratory data as this patient is new to me.

## 2024-11-22 NOTE — PROGRESS NOTE ADULT - PROBLEM SELECTOR PLAN 1
- Saddle pulmonary embolus  - On Lovenox 40mg BID  - On IV Pantoprazole 40mg QD for melena  - On HFNC - currently 40/60%, will wean as able with goal to eventually NC  - Symptom directed approach

## 2024-11-22 NOTE — PROGRESS NOTE ADULT - SUBJECTIVE AND OBJECTIVE BOX
GAP TEAM PALLIATIVE CARE UNIT PROGRESS NOTE:      [  ] Patient on hospice program.    INDICATION FOR PALLIATIVE CARE UNIT SERVICES/Interval HPI: 74 year old woman w/ PMHx of pancreatic cancer on chemotherapy, DVT on Eliquis 2.5mg BID, HTN, recent admission for symptomatic anemia requiring blood transfusion. Now presenting with SOB as well as melena s/p transfusion 11/11, found to have saddle PE and started on HFNC. Patient on the PCU for symptom management.    OVERNIGHT EVENTS: No acute event overnight, patient seen awake and eating breakfast comfortably. Reports body aches for which she attributed to being on bed for so long. Denies SOB or chest pain. On HFNC 40/60%, saturating well.     DNR on chart:  DNI: Trial NIV  DNI: Trial NIV        Allergies    No Known Allergies    Intolerances    MEDICATIONS  (STANDING):  Biotene Dry Mouth Oral Rinse 5 milliLiter(s) Swish and Spit four times a day  enoxaparin Injectable 50 milliGRAM(s) SubCutaneous <User Schedule>  pantoprazole  Injectable 40 milliGRAM(s) IV Push two times a day    MEDICATIONS  (PRN):  acetaminophen   IVPB .. 1000 milliGRAM(s) IV Intermittent once PRN Mild Pain (1 - 3)  acetaminophen  Suppository .. 650 milliGRAM(s) Rectal every 8 hours PRN Temp greater or equal to 38C (100.4F), Mild Pain (1 - 3)  benzocaine 20% Spray 1 Spray(s) Topical three times a day PRN sore throat  bisacodyl Suppository 10 milliGRAM(s) Rectal daily PRN Constipation  glycopyrrolate Injectable 0.4 milliGRAM(s) IV Push every 6 hours PRN secretions  morphine  - Injectable 2 milliGRAM(s) IV Push every 4 hours PRN Severe Pain (7 - 10)  morphine  - Injectable 1 milliGRAM(s) IV Push every 4 hours PRN Moderate Pain (4 - 6)  morphine  - Injectable 2 milliGRAM(s) IV Push every 4 hours PRN dyspnea    ITEMS UNCHECKED ARE NOT PRESENT    PRESENT SYMPTOMS: [ ]Unable to self-report see PAINAD, RDOS below  Source if other than patient:  [ ]Family   [ ]Team     Pain: [x] yes [ ] no  QOL impact - Mild   Location - Diffuse body aches                   Aggravating factors - Being bedbound  Quality -  Radiation - None  Timing- Waxing and waning  Severity (0-10 scale):  Minimal acceptable level (0-10 scale):       PCSSQ [Palliative Care Spiritual Screening Question]   Severity (0-10):  Score of 4 or > indicate consideration of Chaplaincy referral.  Chaplaincy Referral: [ ] yes [ ] refused [ ] following [ ] deferred    Caregiver [place]? : [ ] yes [x] no [ ] deferred:  Social work referral [ ] Patient & Family Centered Care Referral [ ]   Anticipatory Grief present?:  [ ] yes [x] no [ ] deferred:  Social work referral [ ] Patient & Family Centered Care Referral [ ]  	  Other Symptoms:  [x]All other review of systems negative     PHYSICAL EXAM:   Vital Signs Last 24 Hrs  T(C): 36.8 (22 Nov 2024 08:57), Max: 36.8 (22 Nov 2024 08:57)  T(F): 98.2 (22 Nov 2024 08:57), Max: 98.2 (22 Nov 2024 08:57)  HR: 85 (22 Nov 2024 09:09) (84 - 88)  BP: 110/74 (22 Nov 2024 08:57) (110/74 - 110/74)  BP(mean): --  RR: 18 (22 Nov 2024 09:09) (18 - 20)  SpO2: 98% (22 Nov 2024 09:09) (93% - 99%)    Parameters below as of 22 Nov 2024 09:09  Patient On (Oxygen Delivery Method): nasal cannula, high flow  O2 Flow (L/min): 40  O2 Concentration (%): 60 I&O's Summary    GENERAL: [ ] Cachexia  [x]Alert  [ ]Oriented x   [ ]Lethargic  [ ]Unarousable  [x]Verbal - minimally  [ ]Non-Verbal  Behavioral:   [ ] Anxiety  [ ] Delirium [ ] Agitation [x] Other - Calm  HEENT:  [ ]Normal   [x]Dry mouth   [ ]ET Tube/Trach  [ ]Oral lesions  PULMONARY:   [x]Clear [ ]Tachypnea  [ ]Audible excessive secretions   [ ]Rhonchi        [ ]Right [ ]Left [ ]Bilateral  [ ]Crackles        [ ]Right [ ]Left [ ]Bilateral  [ ]Wheezing     [ ]Right [ ]Left [ ]Bilateral  [x]Diminished BS [ ]Right [ ]Left [x]Bilateral    CARDIOVASCULAR:    [x]Regular [ ]Irregular [ ]Tachy  [ ]Emery [ ]Murmur [ ]Other  GASTROINTESTINAL:  [x]Soft  [ ]Distended   [ ]+BS  [x]Non tender [ ]Tender  [ ]Other [ ]PEG [ ]OGT/ NGT   Last BM:    GENITOURINARY:  [ ]Normal [ ] Incontinent   [ ]Oliguria/Anuria   [ ]Vilchis  MUSCULOSKELETAL:   [ ]Normal   [ ]Weakness  [ ]Bed/Wheelchair bound [x]Edema  NEUROLOGIC:   [x]No focal deficits  [ ] Cognitive impairment  [ ] Dysphagia [ ]Dysarthria [ ] Paresis [ ]Other   SKIN:   [ ]Normal  [ ]Rash  [ ]Other  [ ]Pressure ulcer(s)  [ ]y [ ]n  Present on admission      CRITICAL CARE:  [ ] Shock Present  [ ]Septic [ ]Cardiogenic [ ]Neurologic [ ]Hypovolemic  [ ]  Vasopressors [ ]  Inotropes   [ ] Respiratory failure present [ ] Mechanical Ventilation [ ] Non-invasive ventilatory support [ ] High-Flow    [ ] Acute  [ ] Chronic [ ] Hypoxic  [ ] Hypercarbic [ ] Other  [ ] Other organ failure     LABS:            RADIOLOGY & ADDITIONAL STUDIES:    PROTEIN CALORIE MALNUTRITION: [ ] mild [ ] moderate [ ] severe  [ ] underweight [ ] morbid obesity    https://www.andeal.org/vault/2440/web/files/ONC/Table_Clinical%20Characteristics%20to%20Document%20Malnutrition-White%20JV%20et%20al%202012.pdf        [x] PPSV2 < or = 30% [ ] significant weight loss [ ] poor nutritional intake [ ] anasarca   Artificial Nutrition [ ]     Other REFERRALS:    [ ] Hospice  [ ]Child Life  [ ]Social Work  [ ]Case management [ ]Holistic Therapy [ ] Physical Therapy [ ] Dietary         Palliative Performance Scale:  http://npcrc.org/files/news/palliative_performance_scale_ppsv2.pdf  (Ctrl +  left click to view)  Respiratory Distress Observation Tool:  https://homecareinformation.net/handouts/hen/Respiratory_Distress_Observation_Scale.pdf (Ctrl +  left click to view)  PAINAD Score:  http://geriatrictoolkit.[email or web address]/cog/painad.pdf (Ctrl +  left click to view)

## 2024-11-22 NOTE — PROGRESS NOTE ADULT - PROBLEM SELECTOR PLAN 4
- Benzocaine spray TID PRN for sore throat  - morphine  1 mg IVP q4h prn moderate pain (Required 0 PRNs over the last 24h)  - morphine 2 mg IVP q4h prn severe pain (Required 0 PRNs over the last 24h)  - Monitor constipation while on opioid.

## 2024-11-23 NOTE — PROGRESS NOTE ADULT - SUBJECTIVE AND OBJECTIVE BOX
GAP TEAM PALLIATIVE CARE UNIT PROGRESS NOTE:      [  ] Patient on hospice program.    INDICATION FOR PALLIATIVE CARE UNIT SERVICES/Interval HPI: 74 year old woman w/ PMHx of pancreatic cancer on chemotherapy, DVT on Eliquis 2.5mg BID, HTN, recent admission for symptomatic anemia requiring blood transfusion. Now presenting with SOB as well as melena s/p transfusion 11/11, found to have saddle PE and started on HFNC. Patient on the PCU for symptom management.    OVERNIGHT EVENTS: No acute event overnight, patient seen awake and eating breakfast comfortably. Reports body aches for which she attributed to being on bed for so long. Denies SOB or chest pain. On HFNC 40/50%, saturating well.     DNR on chart:  DNI: Trial NIV  DNI: Trial NIV      Allergies  No Known Allergies  Intolerances    MEDICATIONS  (STANDING):  Biotene Dry Mouth Oral Rinse 5 milliLiter(s) Swish and Spit four times a day  chlorhexidine 2% Cloths 1 Application(s) Topical daily  enoxaparin Injectable 50 milliGRAM(s) SubCutaneous <User Schedule>  pantoprazole  Injectable 40 milliGRAM(s) IV Push two times a day    MEDICATIONS  (PRN):  acetaminophen   IVPB .. 1000 milliGRAM(s) IV Intermittent once PRN Mild Pain (1 - 3)  acetaminophen  Suppository .. 650 milliGRAM(s) Rectal every 8 hours PRN Temp greater or equal to 38C (100.4F), Mild Pain (1 - 3)  benzocaine 20% Spray 1 Spray(s) Topical three times a day PRN sore throat  bisacodyl Suppository 10 milliGRAM(s) Rectal daily PRN Constipation  glycopyrrolate Injectable 0.4 milliGRAM(s) IV Push every 6 hours PRN secretions  morphine  - Injectable 1 milliGRAM(s) IV Push every 4 hours PRN Moderate Pain (4 - 6)  morphine  - Injectable 2 milliGRAM(s) IV Push every 4 hours PRN Severe Pain (7 - 10)  morphine  - Injectable 2 milliGRAM(s) IV Push every 4 hours PRN dyspnea    ITEMS UNCHECKED ARE NOT PRESENT    PRESENT SYMPTOMS: [ ]Unable to self-report see PAINAD, RDOS below  Source if other than patient:  [ ]Family   [ ]Team     Pain: [] yes [x ] no  QOL impact -   Location -                  Aggravating factors -   Quality -  Radiation -   Timing- Waxing and waning  Severity (0-10 scale):  Minimal acceptable level (0-10 scale):     PCSSQ [Palliative Care Spiritual Screening Question]   Severity (0-10):  Score of 4 or > indicate consideration of Chaplaincy referral.  Chaplaincy Referral: [ ] yes [ ] refused [ ] following [ ] deferred    Caregiver [place]? : [ ] yes [x] no [ ] deferred:  Social work referral [ ] Patient & Family Centered Care Referral [ ]   Anticipatory Grief present?:  [ x] yes [] no [ ] deferred:  Social work referral [ ] Patient & Family Centered Care Referral [ ]  	  Other Symptoms:  [x]All other review of systems negative     PHYSICAL EXAM:   Vital Signs Last 24 Hrs  T(C): 36.7 (23 Nov 2024 08:11), Max: 36.7 (23 Nov 2024 08:11)  T(F): 98 (23 Nov 2024 08:11), Max: 98 (23 Nov 2024 08:11)  HR: 86 (23 Nov 2024 10:17) (81 - 88)  BP: 128/66 (23 Nov 2024 08:11) (128/66 - 128/66)  BP(mean): --  RR: 18 (23 Nov 2024 10:17) (16 - 18)  SpO2: 90% (23 Nov 2024 10:17) (90% - 96%)    Parameters below as of 23 Nov 2024 10:17  Patient On (Oxygen Delivery Method): nasal cannula, high flow  O2 Flow (L/min): 30  O2 Concentration (%): 40    GENERAL: [ x] Cachexia [x] frail   [x]Alert  [ ]Oriented x   [ ]Lethargic  [ ]Unarousable  [x]Verbal - minimally  [ ]Non-Verbal  Behavioral:   [ ] Anxiety  [ ] Delirium [ ] Agitation [x] Other - Calm  HEENT:  [ ]Normal   [x]Dry mouth   [ ]ET Tube/Trach  [ ]Oral lesions  PULMONARY:   [x]Clear [ ]Tachypnea  [ ]Audible excessive secretions   [ ]Rhonchi        [ ]Right [ ]Left [ ]Bilateral  [ ]Crackles        [ ]Right [ ]Left [ ]Bilateral  [ ]Wheezing     [ ]Right [ ]Left [ ]Bilateral  [x]Diminished BS [ ]Right [ ]Left [x]Bilateral    CARDIOVASCULAR:    [x]Regular [ ]Irregular [ ]Tachy  [ ]Emery [ ]Murmur [ ]Other  GASTROINTESTINAL:  [x]Soft  [ ]Distended   [ ]+BS  [x]Non tender [ ]Tender  [ ]Other [ ]PEG [ ]OGT/ NGT   Last BM:  11/23  GENITOURINARY:  [ ]Normal [ ] Incontinent   [ ]Oliguria/Anuria   [ ]Vilchis  MUSCULOSKELETAL:   [ ]Normal   [ ]Weakness  [ ]Bed/Wheelchair bound [x]Edema  NEUROLOGIC:   [x]No focal deficits  [ ] Cognitive impairment  [ ] Dysphagia [ ]Dysarthria [ ] Paresis [ ]Other   SKIN:   [ ]Normal  [ ]Rash  [ ]Other  [x ]Pressure ulcer(s)  [ ]y [ ]n  Present on admission  sacral and b/l heel DTI Please see nursing assessment for further details for which I have reviewed.    CRITICAL CARE:  [ ] Shock Present  [ ]Septic [ ]Cardiogenic [ ]Neurologic [ ]Hypovolemic  [ ]  Vasopressors [ ]  Inotropes   [x ] Respiratory failure present [ ] Mechanical Ventilation [x ] Non-invasive ventilatory support [ ] High-Flow    [ ] Acute  [ ] Chronic [ ] Hypoxic  [ ] Hypercarbic [ ] Other  [ ] Other organ failure     LABS: None new.    RADIOLOGY & ADDITIONAL STUDIES: None new.    PROTEIN CALORIE MALNUTRITION: [ ] mild [ ] moderate [x ] severe  [ ] underweight [ ] morbid obesity    https://www.andeal.org/vault/2440/web/files/ONC/Table_Clinical%20Characteristics%20to%20Document%20Malnutrition-White%20JV%20et%20al%202012.pdf        [x] PPSV2 < or = 30% [ ] significant weight loss [ ] poor nutritional intake [ ] anasarca   Artificial Nutrition [ ]     Other REFERRALS:    [ ] Hospice  [ ]Child Life  [ ]Social Work  [ ]Case management [ ]Holistic Therapy [ ] Physical Therapy [ ] Dietary         Palliative Performance Scale:  http://npcrc.org/files/news/palliative_performance_scale_ppsv2.pdf  (Ctrl +  left click to view)  Respiratory Distress Observation Tool:  https://homecareinformation.net/handouts/hen/Respiratory_Distress_Observation_Scale.pdf (Ctrl +  left click to view)  PAINAD Score:  http://geriatrictoolkit.[email or web address]/cog/painad.pdf (Ctrl +  left click to view)

## 2024-11-23 NOTE — PROGRESS NOTE ADULT - PROBLEM SELECTOR PLAN 1
- Saddle pulmonary embolus  - On Lovenox 40mg BID  - On IV Pantoprazole 40mg QD for melena  - On HFNC - currently 40L/50%, will wean as able with goal to eventually NC  - Symptom directed approach

## 2024-11-23 NOTE — PROGRESS NOTE ADULT - PROBLEM SELECTOR PLAN 6
- c/w comfort directed care in the PCU, dispo dependent on clinical course and sxs management  - plan likely for home hospice once HFNC weaned  - family updated at bedside, medical updates and emotional support provided, all questions and concerns addressed.

## 2024-11-24 NOTE — PROGRESS NOTE ADULT - PROBLEM SELECTOR PLAN 1
- Saddle pulmonary embolus  - On Lovenox 40mg BID  - On IV Pantoprazole 40mg QD for melena  - On HFNC - currently 40L/50%, will wean as able with goal to eventually NC  - Symptom directed approach - Saddle pulmonary embolus  - On Lovenox 40mg BID  - On IV Pantoprazole 40mg QD for melena  - weaned from HF to regular NC, tolerating well   - Symptom directed approach

## 2024-11-24 NOTE — PROGRESS NOTE ADULT - SUBJECTIVE AND OBJECTIVE BOX
GAP TEAM PALLIATIVE CARE UNIT PROGRESS NOTE:      [  ] Patient on hospice program.    INDICATION FOR PALLIATIVE CARE UNIT SERVICES/Interval HPI: 74 year old woman w/ PMHx of pancreatic cancer on chemotherapy, DVT on Eliquis 2.5mg BID, HTN, recent admission for symptomatic anemia requiring blood transfusion. Now presenting with SOB as well as melena s/p transfusion 11/11, found to have saddle PE and started on HFNC. Patient on the PCU for symptom management.    OVERNIGHT EVENTS: No acute event overnight. No prns required. comfortable appearing on regular nasal canula.     DNR on chart:  DNI: Trial NIV  DNI: Trial NIV    Allergies  No Known Allergies  Intolerances    MEDICATIONS  (STANDING):  Biotene Dry Mouth Oral Rinse 5 milliLiter(s) Swish and Spit four times a day  chlorhexidine 2% Cloths 1 Application(s) Topical daily  enoxaparin Injectable 50 milliGRAM(s) SubCutaneous <User Schedule>  pantoprazole  Injectable 40 milliGRAM(s) IV Push two times a day    MEDICATIONS  (PRN):  acetaminophen   IVPB .. 1000 milliGRAM(s) IV Intermittent once PRN Mild Pain (1 - 3)  acetaminophen  Suppository .. 650 milliGRAM(s) Rectal every 8 hours PRN Temp greater or equal to 38C (100.4F), Mild Pain (1 - 3)  benzocaine 20% Spray 1 Spray(s) Topical three times a day PRN sore throat  bisacodyl Suppository 10 milliGRAM(s) Rectal daily PRN Constipation  glycopyrrolate Injectable 0.4 milliGRAM(s) IV Push every 6 hours PRN secretions  morphine  - Injectable 1 milliGRAM(s) IV Push every 4 hours PRN Moderate Pain (4 - 6)  morphine  - Injectable 2 milliGRAM(s) IV Push every 4 hours PRN Severe Pain (7 - 10)  morphine  - Injectable 2 milliGRAM(s) IV Push every 4 hours PRN dyspnea    ITEMS UNCHECKED ARE NOT PRESENT    PRESENT SYMPTOMS: [ ]Unable to self-report see PAINAD, RDOS below  Source if other than patient:  [ ]Family   [ ]Team     Pain: [] yes [x ] no  QOL impact -   Location -                  Aggravating factors -   Quality -  Radiation -   Timing- Waxing and waning  Severity (0-10 scale):  Minimal acceptable level (0-10 scale):     PCSSQ [Palliative Care Spiritual Screening Question]   Severity (0-10):  Score of 4 or > indicate consideration of Chaplaincy referral.  Chaplaincy Referral: [ ] yes [ ] refused [ ] following [ ] deferred    Caregiver [place]? : [ ] yes [x] no [ ] deferred:  Social work referral [ ] Patient & Family Centered Care Referral [ ]   Anticipatory Grief present?:  [ x] yes [] no [ ] deferred:  Social work referral [ ] Patient & Family Centered Care Referral [ ]  	  Other Symptoms:  [x]All other review of systems negative     PHYSICAL EXAM:   Vital Signs Last 24 Hrs  T(C): 36.7 (23 Nov 2024 08:11), Max: 36.7 (23 Nov 2024 08:11)  T(F): 98 (23 Nov 2024 08:11), Max: 98 (23 Nov 2024 08:11)  HR: 86 (23 Nov 2024 10:17) (81 - 88)  BP: 128/66 (23 Nov 2024 08:11) (128/66 - 128/66)  BP(mean): --  RR: 18 (23 Nov 2024 10:17) (16 - 18)  SpO2: 90% (23 Nov 2024 10:17) (90% - 96%)    Parameters below as of 23 Nov 2024 10:17  Patient On (Oxygen Delivery Method): nasal cannula, high flow  O2 Flow (L/min): 30  O2 Concentration (%): 40    GENERAL: [ x] Cachexia [x] frail   [x]Alert  [ ]Oriented x   [ ]Lethargic  [ ]Unarousable  [x]Verbal - minimally  [ ]Non-Verbal  Behavioral:   [ ] Anxiety  [ ] Delirium [ ] Agitation [x] Other - Calm  HEENT:  [ ]Normal   [x]Dry mouth   [ ]ET Tube/Trach  [ ]Oral lesions  PULMONARY:   [x]Clear [ ]Tachypnea  [ ]Audible excessive secretions   [ ]Rhonchi        [ ]Right [ ]Left [ ]Bilateral  [ ]Crackles        [ ]Right [ ]Left [ ]Bilateral  [ ]Wheezing     [ ]Right [ ]Left [ ]Bilateral  [x]Diminished BS [ ]Right [ ]Left [x]Bilateral    CARDIOVASCULAR:    [x]Regular [ ]Irregular [ ]Tachy  [ ]Emery [ ]Murmur [ ]Other  GASTROINTESTINAL:  [x]Soft  [ ]Distended   [ ]+BS  [x]Non tender [ ]Tender  [ ]Other [ ]PEG [ ]OGT/ NGT   Last BM:  11/23  GENITOURINARY:  [ ]Normal [ ] Incontinent   [ ]Oliguria/Anuria   [ ]Vilchis  MUSCULOSKELETAL:   [ ]Normal   [ ]Weakness  [ ]Bed/Wheelchair bound [x]Edema  NEUROLOGIC:   [x]No focal deficits  [ ] Cognitive impairment  [ ] Dysphagia [ ]Dysarthria [ ] Paresis [ ]Other   SKIN:   [ ]Normal  [ ]Rash  [ ]Other  [x ]Pressure ulcer(s)  [ ]y [ ]n  Present on admission  sacral and b/l heel DTI Please see nursing assessment for further details for which I have reviewed.    CRITICAL CARE:  [ ] Shock Present  [ ]Septic [ ]Cardiogenic [ ]Neurologic [ ]Hypovolemic  [ ]  Vasopressors [ ]  Inotropes   [x ] Respiratory failure present [ ] Mechanical Ventilation [x ] Non-invasive ventilatory support [ ] High-Flow    [ ] Acute  [ ] Chronic [ ] Hypoxic  [ ] Hypercarbic [ ] Other  [ ] Other organ failure     LABS: None new.    RADIOLOGY & ADDITIONAL STUDIES: None new.    PROTEIN CALORIE MALNUTRITION: [ ] mild [ ] moderate [x ] severe  [ ] underweight [ ] morbid obesity    https://www.andeal.org/vault/2440/web/files/ONC/Table_Clinical%20Characteristics%20to%20Document%20Malnutrition-White%20JV%20et%20al%202012.pdf        [x] PPSV2 < or = 30% [ ] significant weight loss [ ] poor nutritional intake [ ] anasarca   Artificial Nutrition [ ]     Other REFERRALS:    [ ] Hospice  [ ]Child Life  [ ]Social Work  [ ]Case management [ ]Holistic Therapy [ ] Physical Therapy [ ] Dietary         Palliative Performance Scale:  http://npcrc.org/files/news/palliative_performance_scale_ppsv2.pdf  (Ctrl +  left click to view)  Respiratory Distress Observation Tool:  https://homecareinformation.net/handouts/hen/Respiratory_Distress_Observation_Scale.pdf (Ctrl +  left click to view)  PAINAD Score:  http://geriatrictoolkit.[facility]/cog/painad.pdf (Ctrl +  left click to view)   GAP TEAM PALLIATIVE CARE UNIT PROGRESS NOTE:      [  ] Patient on hospice program.    INDICATION FOR PALLIATIVE CARE UNIT SERVICES/Interval HPI: 74 year old woman w/ PMHx of pancreatic cancer on chemotherapy, DVT on Eliquis 2.5mg BID, HTN, recent admission for symptomatic anemia requiring blood transfusion. Now presenting with SOB as well as melena s/p transfusion 11/11, found to have saddle PE and started on HFNC. Patient on the PCU for symptom management.    OVERNIGHT EVENTS: No acute event overnight. No prns required. Comfortable appearing on regular nasal canula.     DNR on chart:  DNI: Trial NIV  DNI: Trial NIV    Allergies  No Known Allergies  Intolerances    MEDICATIONS  (STANDING):  Biotene Dry Mouth Oral Rinse 5 milliLiter(s) Swish and Spit four times a day  chlorhexidine 2% Cloths 1 Application(s) Topical daily  enoxaparin Injectable 50 milliGRAM(s) SubCutaneous <User Schedule>  pantoprazole  Injectable 40 milliGRAM(s) IV Push two times a day    MEDICATIONS  (PRN):  acetaminophen   IVPB .. 1000 milliGRAM(s) IV Intermittent once PRN Mild Pain (1 - 3)  acetaminophen  Suppository .. 650 milliGRAM(s) Rectal every 8 hours PRN Temp greater or equal to 38C (100.4F), Mild Pain (1 - 3)  benzocaine 20% Spray 1 Spray(s) Topical three times a day PRN sore throat  bisacodyl Suppository 10 milliGRAM(s) Rectal daily PRN Constipation  glycopyrrolate Injectable 0.4 milliGRAM(s) IV Push every 6 hours PRN secretions  morphine  - Injectable 2 milliGRAM(s) IV Push every 4 hours PRN dyspnea  morphine  - Injectable 1 milliGRAM(s) IV Push every 4 hours PRN Moderate Pain (4 - 6)  morphine  - Injectable 2 milliGRAM(s) IV Push every 4 hours PRN Severe Pain (7 - 10)    ITEMS UNCHECKED ARE NOT PRESENT    PRESENT SYMPTOMS: [ ]Unable to self-report see PAINAD, RDOS below  Source if other than patient:  [ ]Family   [ ]Team     Pain: [ ] yes [x ] no  QOL impact -   Location -                  Aggravating factors -   Quality -  Radiation -   Timing- Waxing and waning  Severity (0-10 scale):  Minimal acceptable level (0-10 scale):     PCSSQ [Palliative Care Spiritual Screening Question]   Severity (0-10):  Score of 4 or > indicate consideration of Chaplaincy referral.  Chaplaincy Referral: [ ] yes [ ] refused [ ] following [ ] deferred    Caregiver [place]? : [ ] yes [x] no [ ] deferred:  Social work referral [ ] Patient & Family Centered Care Referral [ ]   Anticipatory Grief present?:  [ x] yes [] no [ ] deferred:  Social work referral [ ] Patient & Family Centered Care Referral [ ]  	  Other Symptoms:  [x]All other review of systems negative     PHYSICAL EXAM:   Vital Signs Last 24 Hrs  T(C): 37.1 (24 Nov 2024 08:48), Max: 37.1 (24 Nov 2024 08:48)  T(F): 98.7 (24 Nov 2024 08:48), Max: 98.7 (24 Nov 2024 08:48)  HR: 84 (24 Nov 2024 08:48) (84 - 88)  BP: 124/67 (24 Nov 2024 08:48) (124/67 - 124/67)  BP(mean): --  RR: 16 (24 Nov 2024 08:48) (16 - 16)  SpO2: 95% (24 Nov 2024 08:48) (95% - 96%)    Parameters below as of 24 Nov 2024 08:48  Patient On (Oxygen Delivery Method): nasal cannula    GENERAL: [ x] Cachexia [x] frail   [x]Alert  [ ]Oriented x   [ ]Lethargic  [ ]Unarousable  [x]Verbal - minimally  [ ]Non-Verbal  Behavioral:   [ ] Anxiety  [ ] Delirium [ ] Agitation [x] Other - Calm  HEENT:  [ ]Normal   [x]Dry mouth   [ ]ET Tube/Trach  [ ]Oral lesions  PULMONARY:   [x]Clear [ ]Tachypnea  [ ]Audible excessive secretions   [ ]Rhonchi        [ ]Right [ ]Left [ ]Bilateral  [ ]Crackles        [ ]Right [ ]Left [ ]Bilateral  [ ]Wheezing     [ ]Right [ ]Left [ ]Bilateral  [x]Diminished BS [ ]Right [ ]Left [x]Bilateral    CARDIOVASCULAR:    [x]Regular [ ]Irregular [ ]Tachy  [ ]Emery [ ]Murmur [ ]Other  GASTROINTESTINAL:  [x]Soft  [ ]Distended   [ ]+BS  [x]Non tender [ ]Tender  [ ]Other [ ]PEG [ ]OGT/ NGT   Last BM:  11/23  GENITOURINARY:  [ ]Normal [ x] Incontinent   [ ]Oliguria/Anuria   [ ]Vilchis  MUSCULOSKELETAL:   [ ]Normal   [ ]Weakness  [ ]Bed/Wheelchair bound [x]Edema  NEUROLOGIC:   [x]No focal deficits  [ ] Cognitive impairment  [ ] Dysphagia [ ]Dysarthria [ ] Paresis [ ]Other   SKIN:   [ ]Normal  [ ]Rash  [ ]Other  [x ]Pressure ulcer(s)  [ ]y [ ]n  Present on admission  sacral and b/l heel DTI Please see nursing assessment for further details for which I have reviewed.    CRITICAL CARE:  [ ] Shock Present  [ ]Septic [ ]Cardiogenic [ ]Neurologic [ ]Hypovolemic  [ ]  Vasopressors [ ]  Inotropes   [x ] Respiratory failure present [ ] Mechanical Ventilation [x ] Non-invasive ventilatory support [ ] High-Flow    [ ] Acute  [ ] Chronic [ ] Hypoxic  [ ] Hypercarbic [ ] Other  [ ] Other organ failure     LABS: None new.    RADIOLOGY & ADDITIONAL STUDIES: None new.    PROTEIN CALORIE MALNUTRITION: [ ] mild [ ] moderate [x ] severe  [ ] underweight [ ] morbid obesity    https://www.andeal.org/vault/2440/web/files/ONC/Table_Clinical%20Characteristics%20to%20Document%20Malnutrition-White%20JV%20et%20al%009344.pdf    [x] PPSV2 < or = 30% [ ] significant weight loss [ ] poor nutritional intake [ ] anasarca   Artificial Nutrition [ ]     Other REFERRALS:    [ ] Hospice  [ ]Child Life  [ ]Social Work  [ ]Case management [ ]Holistic Therapy [ ] Physical Therapy [ ] Dietary     Palliative Performance Scale:  http://npcrc.org/files/news/palliative_performance_scale_ppsv2.pdf  (Ctrl +  left click to view)  Respiratory Distress Observation Tool:  https://homecareinformation.net/handouts/hen/Respiratory_Distress_Observation_Scale.pdf (Ctrl +  left click to view)  PAINAD Score:  http://geriatrictoolkit.[place].[facility]/cog/painad.pdf (Ctrl +  left click to view)

## 2024-11-24 NOTE — PROGRESS NOTE ADULT - PROBLEM SELECTOR PLAN 6
- c/w comfort directed care in the PCU, dispo dependent on clinical course and sxs management  - plan likely for home hospice once HFNC weaned  - family updated at bedside, medical updates and emotional support provided, all questions and concerns addressed. - c/w comfort directed care in the PCU, dispo dependent on clinical course and sxs management  - plan for home hospice, will d/w SW and family   - family updated at bedside, medical updates and emotional support provided, all questions and concerns addressed.

## 2024-11-25 NOTE — PROGRESS NOTE ADULT - PROBLEM SELECTOR PLAN 4
- PPSV 20%. The patient requires nursing assistance with all ADLs.   - Supportive care.  - Turn and position.  - Continue with good skin care.

## 2024-11-25 NOTE — PROGRESS NOTE ADULT - NS ATTEND AMEND GEN_ALL_CORE FT
74 year old woman w/ PMHx of pancreatic cancer on chemotherapy, DVT on Eliquis 2.5mg BID, HTN, recent admission for symptomatic anemia requiring blood transfusion. Now presenting with SOB as well as melena s/p transfusion 11/11, found to have saddle PE and started on HFNC.   Patient transferred to PCU for management of sx and disposition planning.  Patient presently asymptomatic for pain/dyspnea/agitation.    No PRNs required for sx.  Continue Lovenox for saddle pulmonary embolism. Pt weaned from HFCN to 6L NC. Pt hypoxic this AM and unstable for d/c. Pt appears to be in dying process and will remain in PCU for end of life care. Family updated by team and educated on what to expect. Case discussed during IDT rounds.

## 2024-11-25 NOTE — PROGRESS NOTE ADULT - PROBLEM SELECTOR PLAN 2
- 2/2 PE   - Continue with Morphine 2mg IV q1hr PRN. ( None required in a 24hr period 7am-7am)   - Bowel regimen while on opioids - 2/2 PE   - Continue with Morphine 2mg IV q1hr PRN. (None required in a 24hr period 7am-7am)   - Bowel regimen while on opioids

## 2024-11-25 NOTE — PROGRESS NOTE ADULT - PROBLEM SELECTOR PLAN 3
- Benzocaine spray TID PRN for sore throat  - Morphine 1mg IV q4h PRN moderate pain ( None required in a 24hr period 7am-7am)   - Morphine 2mg IV q4h PRN severe pain ( None required in a 24hr period 7am-7am)   - Monitor constipation while on opioid.

## 2024-11-25 NOTE — PROGRESS NOTE ADULT - SUBJECTIVE AND OBJECTIVE BOX
GAP TEAM PALLIATIVE CARE UNIT PROGRESS NOTE:      [  ] Patient on hospice program.    INDICATION FOR PALLIATIVE CARE UNIT SERVICES/Interval HPI: Symptom management in the setting of a 75y/o F with pancreatic cancer.  P/w SOB, melena found to have saddle PE resulting in AHRF requiring NIPVV. Now off NIPVV    OVERNIGHT EVENTS: Chart reviewed. The patient is seen and examined at the bedside. She did not require any PRNs within the last 24hr period 7am-7am. Patient spouse at the bedside. Mandarin  used.  name: Arya ID # [id number].    DNR on chart: DNI: Trial NIV  DNI: Trial NIV      Allergies    No Known Allergies    Intolerances    MEDICATIONS  (STANDING):  Biotene Dry Mouth Oral Rinse 5 milliLiter(s) Swish and Spit four times a day  chlorhexidine 2% Cloths 1 Application(s) Topical daily  enoxaparin Injectable 50 milliGRAM(s) SubCutaneous <User Schedule>  pantoprazole  Injectable 40 milliGRAM(s) IV Push two times a day    MEDICATIONS  (PRN):  acetaminophen   IVPB .. 1000 milliGRAM(s) IV Intermittent once PRN Mild Pain (1 - 3)  acetaminophen  Suppository .. 650 milliGRAM(s) Rectal every 8 hours PRN Temp greater or equal to 38C (100.4F), Mild Pain (1 - 3)  benzocaine 20% Spray 1 Spray(s) Topical three times a day PRN sore throat  bisacodyl Suppository 10 milliGRAM(s) Rectal daily PRN Constipation  glycopyrrolate Injectable 0.4 milliGRAM(s) IV Push every 6 hours PRN secretions  morphine  - Injectable 1 milliGRAM(s) IV Push every 4 hours PRN Moderate Pain (4 - 6)  morphine  - Injectable 2 milliGRAM(s) IV Push every 4 hours PRN Severe Pain (7 - 10)  morphine  - Injectable 2 milliGRAM(s) IV Push every 4 hours PRN dyspnea    ITEMS UNCHECKED ARE NOT PRESENT    PRESENT SYMPTOMS: [X ]Unable to self-report see PAINAD, RDOS below  Source if other than patient:  [ ]Family   [X]Team     Pain: [ ] yes [ ] no  QOL impact -   Location -                    Aggravating factors -  Quality -  Radiation -  Timing-  Severity (0-10 scale):  Minimal acceptable level (0-10 scale):     Dyspnea:                           [ ]Mild [ ]Moderate [ ]Severe  Anxiety:                             [ ]Mild [ ]Moderate [ ]Severe  Fatigue:                             [ ]Mild [ ]Moderate [ ]Severe  Nausea:                             [ ]Mild [ ]Moderate [ ]Severe  Loss of appetite:              [ ]Mild [ ]Moderate [ ]Severe  Constipation:                    [ ]Mild [ ]Moderate [ ]Severe    PCSSQ [Palliative Care Spiritual Screening Question]   Severity (0-10):  Score of 4 or > indicate consideration of Chaplaincy referral.  Chaplaincy Referral: [ ] yes [ ] refused [X ] following [ ] deferred Last seen on 11/20    Caregiver [place]? : [X ] yes [ ] no [ ] deferred:  Social work referral [x ] Patient & Family Centered Care Referral [ ]     Anticipatory Grief present?:  [X ] yes [ ] no [ ] deferred:  Social work referral [X ] Patient & Family Centered Care Referral [ ]  	  Other Symptoms:  [ ]All other review of systems negative- Unable to self report.      PHYSICAL EXAM:   Vital Signs Last 24 Hrs  T(C): 37.4 (25 Nov 2024 11:00), Max: 37.4 (25 Nov 2024 09:19)  T(F): 99.3 (25 Nov 2024 11:00), Max: 99.4 (25 Nov 2024 09:19)  HR: 109 (25 Nov 2024 11:00) (99 - 109)  BP: 105/64 (25 Nov 2024 09:19) (105/64 - 105/64)  BP(mean): --  RR: 22 (25 Nov 2024 09:19) (22 - 22)  SpO2: 72% (25 Nov 2024 11:00) (72% - 80%)    Parameters below as of 25 Nov 2024 09:19  Patient On (Oxygen Delivery Method): nasal cannula     I&O's Summary    GENERAL: [ ] Cachexia  [ ]Alert  [ ]Oriented x   [ X]Lethargic  [ ]Unarousable  [ ]Verbal  [ ]Non-Verbal  Behavioral:   [ ] Anxiety  [ ] Delirium [ ] Agitation [ ] Other  HEENT:  [ ]Normal   [X ]Dry mouth   [ ]ET Tube/Trach  [ ]Oral lesions  PULMONARY:   [ ]Clear [ ]Tachypnea  [X ]Audible excessive secretions   [ ]Rhonchi        [ ]Right [ ]Left [ ]Bilateral  [ ]Crackles        [ ]Right [ ]Left [ ]Bilateral  [ ]Wheezing     [ ]Right [ ]Left [ ]Bilateral  [ ]Diminished BS [ ]Right [ ]Left [ ]Bilateral    CARDIOVASCULAR:    [ X]Regular [ ]Irregular [ ]Tachy  [ ]Emery [ ]Murmur [ ]Other  GASTROINTESTINAL:  [ X]Soft  [ ]Distended   [ X]+BS  [ X]Non tender [ ]Tender  [ ]Other [ ]PEG [ ]OGT/ NGT   Last BM:  11/25  GENITOURINARY:  [ ]Normal [X ] Incontinent   [ ]Oliguria/Anuria   [X ]Vilchis  MUSCULOSKELETAL:   [ ]Normal   [ ]Weakness  [ ]Bed/Wheelchair bound [ ]Edema  NEUROLOGIC:   [ ]No focal deficits  [ ] Cognitive impairment  [ ] Dysphagia [ ]Dysarthria [ ] Paresis [ ]Other   SKIN:   [ ]Normal  [ ]Rash  [ ]Other  [X ]Pressure ulcer(s)  [ ]y [ ]n  Present on admission  sacral, b/l heel DTI. Please see nursing assessment for further details for which I have reviewed.      CRITICAL CARE:  [ ] Shock Present  [ ]Septic [ ]Cardiogenic [ ]Neurologic [ ]Hypovolemic  [ ]  Vasopressors [ ]  Inotropes   [ ] Respiratory failure present [ ] Mechanical Ventilation [ ] Non-invasive ventilatory support [ ] High-Flow  [ ] Acute  [ ] Chronic [ ] Hypoxic  [ ] Hypercarbic [ ] Other  [X ] Other organ failure- Skin    LABS: Reviewed.     RADIOLOGY & ADDITIONAL STUDIES: Reviewed.     PROTEIN CALORIE MALNUTRITION: [ ] mild [ ] moderate [X ] severe- Please see the nutritionist note.  [ ] underweight [ ] morbid obesity    https://www.andeal.org/vault/2440/web/files/ONC/Table_Clinical%20Characteristics%20to%20Document%20Malnutrition-White%20JV%20et%20al%202012.pdf    Height (cm): 152.4 (11-11-24 @ 21:00), 149.9 (11-11-24 @ 13:29), 149.9 (11-01-24 @ 10:52)  Weight (kg): 51.9 (11-11-24 @ 21:00), 49 (11-11-24 @ 13:29), 47.6 (11-01-24 @ 10:52)  BMI (kg/m2): 22.3 (11-11-24 @ 21:00), 21.8 (11-11-24 @ 13:29), 21.2 (11-01-24 @ 10:52)    [X ] PPSV2 < or = 30% [ ] significant weight loss [ ] poor nutritional intake [ ] anasarca   Artificial Nutrition [ ]     Other REFERRALS:    [ ] Hospice  [ ]Child Life  [X ]Social Work  [ ]Case management [ ]Holistic Therapy [ ] Physical Therapy [ ] Dietary     Progress Notes - Care Coordination [C. Provider] (11-25-24 @ 10:33)      Palliative Performance Scale:  http://npcrc.org/files/news/palliative_performance_scale_ppsv2.pdf  (Ctrl +  left click to view)  Respiratory Distress Observation Tool:  https://[email or web address].[email or web address]/handouts/hen/Respiratory_Distress_Observation_Scale.pdf (Ctrl +  left click to view)  PAINAD Score:  http://[email or web address].[email or web address]/cog/painad.pdf (Ctrl +  left click to view)

## 2024-11-25 NOTE — PROGRESS NOTE ADULT - PROBLEM SELECTOR PLAN 1
- Patient with audible secretions. PRN Robinul given this morning.  - Continue with Robinul 0.4mg IV q6hr PRN.  - Turn and position for postural drainage.

## 2024-11-26 NOTE — PROGRESS NOTE ADULT - PROBLEM SELECTOR PLAN 2
- 2/2 PE   - The patient required PRN Robinul 0.4mg IV X1 for secretions, PRN morphine 1mg IV X1 for moderate pain, PRN Morphine 2mg IV X2 for severe pain and X1 for dyspnea within a 24hr period 7am-7am. Medications adjusted based on need and usage.   - Morphine 2mg IV q6hr ATC ordered  - Increased Morphine from 2mg to 4mg IV q1hr PRN.  - Bowel regimen while on opioids

## 2024-11-26 NOTE — PROGRESS NOTE ADULT - SUBJECTIVE AND OBJECTIVE BOX
GAP TEAM PALLIATIVE CARE UNIT PROGRESS NOTE:      [  ] Patient on hospice program.    INDICATION FOR PALLIATIVE CARE UNIT SERVICES/Interval HPI: Symptom management in the setting of a 75y/o F with pancreatic cancer.  P/w SOB, melena found to have saddle PE resulting in AHRF requiring NIPVV. Now off NIPVV    OVERNIGHT EVENTS: Chart reviewed. The patient is seen and examined at the bedside. She required PRN Robinul 0.4mg IV X1 for secretions, PRN morphine 1mg IV X1 for moderate pain, PRN Morphine 2mg IV X2 for severe pain and X1 for dyspnea within a 24hr period 7am-7am.    DNR on chart: DNI  DNI      Allergies    No Known Allergies    Intolerances    MEDICATIONS  (STANDING):  Biotene Dry Mouth Oral Rinse 5 milliLiter(s) Swish and Spit four times a day  chlorhexidine 2% Cloths 1 Application(s) Topical daily  morphine  - Injectable 2 milliGRAM(s) IV Push every 6 hours  pantoprazole  Injectable 40 milliGRAM(s) IV Push two times a day    MEDICATIONS  (PRN):  acetaminophen   IVPB .. 1000 milliGRAM(s) IV Intermittent once PRN Mild Pain (1 - 3)  acetaminophen  Suppository .. 650 milliGRAM(s) Rectal every 8 hours PRN Temp greater or equal to 38C (100.4F), Mild Pain (1 - 3)  benzocaine 20% Spray 1 Spray(s) Topical three times a day PRN sore throat  bisacodyl Suppository 10 milliGRAM(s) Rectal daily PRN Constipation  glycopyrrolate Injectable 0.4 milliGRAM(s) IV Push every 6 hours PRN secretions  LORazepam   Injectable 0.25 milliGRAM(s) IV Push every 1 hour PRN Agitation/anxiety  morphine  - Injectable 4 milliGRAM(s) IV Push every 1 hour PRN Severe Pain (7 - 10)  morphine  - Injectable 2 milliGRAM(s) IV Push every 1 hour PRN Moderate Pain (4 - 6)  morphine  - Injectable 4 milliGRAM(s) IV Push every 1 hour PRN dyspnea    ITEMS UNCHECKED ARE NOT PRESENT    PRESENT SYMPTOMS: [X ]Unable to self-report see PAINAD, RDOS below  Source if other than patient:  [ ]Family   [ X]Team     Pain: [ ] yes [ ] no  QOL impact -   Location -                    Aggravating factors -  Quality -  Radiation -  Timing-  Severity (0-10 scale):  Minimal acceptable level (0-10 scale):     Dyspnea:                           [ ]Mild [ ]Moderate [ ]Severe  Anxiety:                             [ ]Mild [ ]Moderate [ ]Severe  Fatigue:                             [ ]Mild [ ]Moderate [ ]Severe  Nausea:                             [ ]Mild [ ]Moderate [ ]Severe  Loss of appetite:              [ ]Mild [ ]Moderate [ ]Severe  Constipation:                    [ ]Mild [ ]Moderate [ ]Severe    PCSSQ [Palliative Care Spiritual Screening Question]   Severity (0-10):  Score of 4 or > indicate consideration of Chaplaincy referral.  Chaplaincy Referral: [ ] yes [ ] refused [X ] following [ ] deferred Last seen on 11/20    Caregiver [place]? : [X ] yes [ ] no [ ] deferred:  Social work referral [x ] Patient & Family Centered Care Referral [ ]     Anticipatory Grief present?:  [X ] yes [ ] no [ ] deferred:  Social work referral [X ] Patient & Family Centered Care Referral [ ]  	  Other Symptoms:  [ ]All other review of systems negative- Unable to self report.      PHYSICAL EXAM:   Vital Signs Last 24 Hrs  T(C): 36.8 (26 Nov 2024 08:47), Max: 37.4 (25 Nov 2024 11:00)  T(F): 98.2 (26 Nov 2024 08:47), Max: 99.3 (25 Nov 2024 11:00)  HR: 94 (26 Nov 2024 08:47) (94 - 109)  BP: 100/61 (26 Nov 2024 08:47) (100/61 - 100/61)  BP(mean): --  RR: 18 (26 Nov 2024 08:47) (18 - 18)  SpO2: 89% (26 Nov 2024 08:47) (72% - 89%)    Parameters below as of 26 Nov 2024 08:47  Patient On (Oxygen Delivery Method): nasal cannula    I&O's Summary    GENERAL: [ ] Cachexia  [ ]Alert  [ ]Oriented x   [ X]Lethargic  [ ]Unarousable  [ ]Verbal  [ ]Non-Verbal  Behavioral:   [ ] Anxiety  [ ] Delirium [ ] Agitation [ ] Other  HEENT:  [ ]Normal   [X ]Dry mouth   [ ]ET Tube/Trach  [ ]Oral lesions  PULMONARY:   [ ]Clear [ ]Tachypnea  [X ]Audible excessive secretions   [ ]Rhonchi        [ ]Right [ ]Left [ ]Bilateral  [ ]Crackles        [ ]Right [ ]Left [ ]Bilateral  [ ]Wheezing     [ ]Right [ ]Left [ ]Bilateral  [ ]Diminished BS [ ]Right [ ]Left [ ]Bilateral    CARDIOVASCULAR:    [ X]Regular [ ]Irregular [ ]Tachy  [ ]Emery [ ]Murmur [ ]Other  GASTROINTESTINAL:  [ X]Soft  [ ]Distended   [ X]+BS  [ X]Non tender [ ]Tender  [ ]Other [ ]PEG [ ]OGT/ NGT   Last BM:  11/25  GENITOURINARY:  [ ]Normal [X ] Incontinent   [ ]Oliguria/Anuria   [X ]Vilchis  MUSCULOSKELETAL:   [ ]Normal   [ X]Weakness  [ ]Bed/Wheelchair bound [ ]Edema  NEUROLOGIC:   [ ]No focal deficits  [ ] Cognitive impairment  [ ] Dysphagia [ ]Dysarthria [ ] Paresis [ ]Other   SKIN:   [ ]Normal  [ ]Rash  [ ]Other  [X ]Pressure ulcer(s)  [ ]y [ ]n  Present on admission  sacral, b/l heel DTI. Please see nursing assessment for further details for which I have reviewed.      CRITICAL CARE:  [ ] Shock Present  [ ]Septic [ ]Cardiogenic [ ]Neurologic [ ]Hypovolemic  [ ]  Vasopressors [ ]  Inotropes   [ ] Respiratory failure present [ ] Mechanical Ventilation [ ] Non-invasive ventilatory support [ ] High-Flow  [ ] Acute  [ ] Chronic [ ] Hypoxic  [ ] Hypercarbic [ ] Other  [X ] Other organ failure- Skin    LABS: Reviewed.     RADIOLOGY & ADDITIONAL STUDIES: Reviewed.     PROTEIN CALORIE MALNUTRITION: [ ] mild [ ] moderate [X ] severe- Please see the nutritionist note.  [ ] underweight [ ] morbid obesity    https://www.andeal.org/vault/2440/web/files/ONC/Table_Clinical%20Characteristics%20to%20Document%20Malnutrition-White%20JV%20et%20al%202012.pdf      Height (cm): 152.4 (11-11-24 @ 21:00), 149.9 (11-11-24 @ 13:29), 149.9 (11-01-24 @ 10:52)  Weight (kg): 51.9 (11-11-24 @ 21:00), 49 (11-11-24 @ 13:29), 47.6 (11-01-24 @ 10:52)  BMI (kg/m2): 22.3 (11-11-24 @ 21:00), 21.8 (11-11-24 @ 13:29), 21.2 (11-01-24 @ 10:52)    [X ] PPSV2 < or = 30% [ ] significant weight loss [ ] poor nutritional intake [ ] anasarca   Artificial Nutrition [ ]     Other REFERRALS:    [ ] Hospice  [ ]Child Life  [X ]Social Work  [ ]Case management [ ]Holistic Therapy [ ] Physical Therapy [ ] Dietary     Progress Notes - Care Coordination [C. Provider] (11-25-24 @ 10:33)      Palliative Performance Scale:  http://npcrc.org/files/news/palliative_performance_scale_ppsv2.pdf  (Ctrl +  left click to view)  Respiratory Distress Observation Tool:  https://homecareinformation.net/handouts/hen/Respiratory_Distress_Observation_Scale.pdf (Ctrl +  left click to view)  PAINAD Score:  http://geriatrictoolkit.[place].[facility]/cog/painad.pdf (Ctrl +  left click to view)   GAP TEAM PALLIATIVE CARE UNIT PROGRESS NOTE:      [  ] Patient on hospice program.    INDICATION FOR PALLIATIVE CARE UNIT SERVICES/Interval HPI: Symptom management in the setting of a 73y/o F with pancreatic cancer.  P/w SOB, melena found to have saddle PE resulting in AHRF requiring NIPVV. Now off NIPVV    OVERNIGHT EVENTS: Chart reviewed. The patient is seen and examined at the bedside. She required PRN Robinul 0.4mg IV X1 for secretions, PRN morphine 1mg IV X1 for moderate pain, PRN Morphine 2mg IV X2 for severe pain and X1 for dyspnea within a 24hr period 7am-7am.    DNR on chart: yes  DNI      Allergies    No Known Allergies    Intolerances    MEDICATIONS  (STANDING):  Biotene Dry Mouth Oral Rinse 5 milliLiter(s) Swish and Spit four times a day  chlorhexidine 2% Cloths 1 Application(s) Topical daily  morphine  - Injectable 2 milliGRAM(s) IV Push every 6 hours  pantoprazole  Injectable 40 milliGRAM(s) IV Push two times a day    MEDICATIONS  (PRN):  acetaminophen   IVPB .. 1000 milliGRAM(s) IV Intermittent once PRN Mild Pain (1 - 3)  acetaminophen  Suppository .. 650 milliGRAM(s) Rectal every 8 hours PRN Temp greater or equal to 38C (100.4F), Mild Pain (1 - 3)  benzocaine 20% Spray 1 Spray(s) Topical three times a day PRN sore throat  bisacodyl Suppository 10 milliGRAM(s) Rectal daily PRN Constipation  glycopyrrolate Injectable 0.4 milliGRAM(s) IV Push every 6 hours PRN secretions  LORazepam   Injectable 0.25 milliGRAM(s) IV Push every 1 hour PRN Agitation/anxiety  morphine  - Injectable 4 milliGRAM(s) IV Push every 1 hour PRN Severe Pain (7 - 10)  morphine  - Injectable 2 milliGRAM(s) IV Push every 1 hour PRN Moderate Pain (4 - 6)  morphine  - Injectable 4 milliGRAM(s) IV Push every 1 hour PRN dyspnea    ITEMS UNCHECKED ARE NOT PRESENT    PRESENT SYMPTOMS: [X ]Unable to self-report see PAINAD, RDOS below  Source if other than patient:  [ ]Family   [ X]Team     Pain: [ ] yes [ ] no  QOL impact -   Location -                    Aggravating factors -  Quality -  Radiation -  Timing-  Severity (0-10 scale):  Minimal acceptable level (0-10 scale):     Dyspnea:                           [ ]Mild [ ]Moderate [ ]Severe  Anxiety:                             [ ]Mild [ ]Moderate [ ]Severe  Fatigue:                             [ ]Mild [ ]Moderate [ ]Severe  Nausea:                             [ ]Mild [ ]Moderate [ ]Severe  Loss of appetite:              [ ]Mild [ ]Moderate [ ]Severe  Constipation:                    [ ]Mild [ ]Moderate [ ]Severe    PCSSQ [Palliative Care Spiritual Screening Question]   Severity (0-10):  Score of 4 or > indicate consideration of Chaplaincy referral.  Chaplaincy Referral: [ ] yes [ ] refused [X ] following [ ] deferred Last seen on 11/20    Caregiver [place]? : [X ] yes [ ] no [ ] deferred:  Social work referral [x ] Patient & Family Centered Care Referral [ ]     Anticipatory Grief present?:  [X ] yes [ ] no [ ] deferred:  Social work referral [X ] Patient & Family Centered Care Referral [ ]  	  Other Symptoms:  [ ]All other review of systems negative- Unable to self report.      PHYSICAL EXAM:   Vital Signs Last 24 Hrs  T(C): 36.8 (26 Nov 2024 08:47), Max: 37.4 (25 Nov 2024 11:00)  T(F): 98.2 (26 Nov 2024 08:47), Max: 99.3 (25 Nov 2024 11:00)  HR: 94 (26 Nov 2024 08:47) (94 - 109)  BP: 100/61 (26 Nov 2024 08:47) (100/61 - 100/61)  BP(mean): --  RR: 18 (26 Nov 2024 08:47) (18 - 18)  SpO2: 89% (26 Nov 2024 08:47) (72% - 89%)    Parameters below as of 26 Nov 2024 08:47  Patient On (Oxygen Delivery Method): nasal cannula    I&O's Summary    GENERAL: [ ] Cachexia  [ ]Alert  [ ]Oriented x   [ X]Lethargic  [ ]Unarousable  [ ]Verbal  [ ]Non-Verbal  Behavioral:   [ ] Anxiety  [ ] Delirium [ ] Agitation [ ] Other  HEENT:  [ ]Normal   [X ]Dry mouth   [ ]ET Tube/Trach  [ ]Oral lesions  PULMONARY:   [ ]Clear [ ]Tachypnea  [X ]Audible excessive secretions   [ ]Rhonchi        [ ]Right [ ]Left [ ]Bilateral  [ ]Crackles        [ ]Right [ ]Left [ ]Bilateral  [ ]Wheezing     [ ]Right [ ]Left [ ]Bilateral  [ ]Diminished BS [ ]Right [ ]Left [ ]Bilateral    CARDIOVASCULAR:    [ X]Regular [ ]Irregular [ ]Tachy  [ ]Emery [ ]Murmur [ ]Other  GASTROINTESTINAL:  [ X]Soft  [ ]Distended   [ X]+BS  [ X]Non tender [ ]Tender  [ ]Other [ ]PEG [ ]OGT/ NGT   Last BM:  11/25  GENITOURINARY:  [ ]Normal [X ] Incontinent   [ ]Oliguria/Anuria   [X ]Vilchis  MUSCULOSKELETAL:   [ ]Normal   [ X]Weakness  [ ]Bed/Wheelchair bound [ ]Edema  NEUROLOGIC:   [ ]No focal deficits  [ ] Cognitive impairment  [ ] Dysphagia [ ]Dysarthria [ ] Paresis [ ]Other   SKIN:   [ ]Normal  [ ]Rash  [ ]Other  [X ]Pressure ulcer(s)  [ ]y [ ]n  Present on admission  sacral, b/l heel DTI. Please see nursing assessment for further details for which I have reviewed.      CRITICAL CARE:  [ ] Shock Present  [ ]Septic [ ]Cardiogenic [ ]Neurologic [ ]Hypovolemic  [ ]  Vasopressors [ ]  Inotropes   [ ] Respiratory failure present [ ] Mechanical Ventilation [ ] Non-invasive ventilatory support [ ] High-Flow  [ ] Acute  [ ] Chronic [ ] Hypoxic  [ ] Hypercarbic [ ] Other  [X ] Other organ failure- Skin    LABS: Reviewed.     RADIOLOGY & ADDITIONAL STUDIES: Reviewed.     PROTEIN CALORIE MALNUTRITION: [ ] mild [ ] moderate [X ] severe- Please see the nutritionist note.  [ ] underweight [ ] morbid obesity    https://www.andeal.org/vault/2440/web/files/ONC/Table_Clinical%20Characteristics%20to%20Document%20Malnutrition-White%20JV%20et%20al%202012.pdf      Height (cm): 152.4 (11-11-24 @ 21:00), 149.9 (11-11-24 @ 13:29), 149.9 (11-01-24 @ 10:52)  Weight (kg): 51.9 (11-11-24 @ 21:00), 49 (11-11-24 @ 13:29), 47.6 (11-01-24 @ 10:52)  BMI (kg/m2): 22.3 (11-11-24 @ 21:00), 21.8 (11-11-24 @ 13:29), 21.2 (11-01-24 @ 10:52)    [X ] PPSV2 < or = 30% [ ] significant weight loss [ ] poor nutritional intake [ ] anasarca   Artificial Nutrition [ ]     Other REFERRALS:    [ ] Hospice  [ ]Child Life  [X ]Social Work  [ ]Case management [ ]Holistic Therapy [ ] Physical Therapy [ ] Dietary     Progress Notes - Care Coordination [C. Provider] (11-25-24 @ 10:33)      Palliative Performance Scale:  http://npcrc.org/files/news/palliative_performance_scale_ppsv2.pdf  (Ctrl +  left click to view)  Respiratory Distress Observation Tool:  https://homecareinformation.net/handouts/hen/Respiratory_Distress_Observation_Scale.pdf (Ctrl +  left click to view)  PAINAD Score:  http://geriatrictoolkit.[place].[facility]/cog/painad.pdf (Ctrl +  left click to view)

## 2024-11-26 NOTE — PROGRESS NOTE ADULT - PROBLEM SELECTOR PLAN 3
- Medications adjusted based on need and usage. See above  - Morphine 2mg IV q6hr ATC ordered.  - Increased Morphine from 1mg to 2mg IV q1hr PRN for moderate pain.  - Increased Morphine from 2mg to 4mg IV q1hr PRN for severe pain.  - Bowel regimen while on opioids

## 2024-11-26 NOTE — CHART NOTE - NSCHARTNOTEFT_GEN_A_CORE
Interim Note    Pt on PCU and comfort measures on;ly;     RD remains available.  Tammie Ceja, MS, RD, CDN, CNSC, CDCES TEAMS Interim Note    Pt on PCU and comfort measures only; RD visited to obtain food preferences, family at bedside (Mandarin speaking -  ID [id number] Leilani). Requesting repeat in breakfast meals and easier to tolerate soups. Reports taking Ensure and asking for ice-cream (RD to provide magic cups). Made aware RD to remain available.     RD remains available.  Tammie Ceja, MS, RD, CDN, CNSC, CDCES TEAMS

## 2024-11-26 NOTE — PROGRESS NOTE ADULT - NS ATTEND AMEND GEN_ALL_CORE FT
74 year old woman w/ PMHx of pancreatic cancer on chemotherapy, DVT on Eliquis 2.5mg BID, HTN, recent admission for symptomatic anemia requiring blood transfusion. Now presenting with SOB as well as melena s/p transfusion 11/11, found to have saddle PE and started on HFNC.   Patient transferred to PCU for management of sx and disposition planning.  Patient presently asymptomatic for pain/dyspnea/agitation.    Weaned off of high flow oxygen.  Symptom burden rising and managed.  In the setting of parenteral controlled substance administration, clinical monitoring required for side effects such as respiratory depression, constipation and opioid induced neurotoxicity due to organ failure.    Family at bedside, updated as to current clinical condition and plan of care.  Educated as to what to expect, questions answered and emotional support provided.    Patient assessment and plan discussed on interdisciplinary team rounds today.    DOS 11/26/2024

## 2024-11-26 NOTE — PROGRESS NOTE ADULT - PROBLEM SELECTOR PLAN 1
- Patient with audible secretions.   - Continue with Robinul 0.4mg IV q6hr PRN. ( 1 required in a 24hr period 7am-7am)   - Turn and position for postural drainage.

## 2024-11-26 NOTE — PROGRESS NOTE ADULT - PROBLEM SELECTOR PLAN 5
- Saddle pulmonary embolus.  - I spoke to the patient's son, Shon on the phone. Discussed stopping Lovenox and focusing on symptoms and comfort. Shon is in agreement. d/c Lovenox.    - Symptom directed approach

## 2024-11-27 NOTE — PROGRESS NOTE ADULT - SUBJECTIVE AND OBJECTIVE BOX
GAP TEAM PALLIATIVE CARE UNIT PROGRESS NOTE:      [  ] Patient on hospice program.    INDICATION FOR PALLIATIVE CARE UNIT SERVICES/Interval HPI: Symptom management in the setting of a 73y/o F with pancreatic cancer.  P/w SOB, melena found to have saddle PE resulting in AHRF requiring NIPVV. Now off NIPVV    OVERNIGHT EVENTS: Chart reviewed. The patient is seen and examined at the bedside. She required PRN Morphine 2mg IV X1 for moderate pain within a 24hr period 7am-7am.    DNR on chart: DNI  DNI      Allergies    No Known Allergies    Intolerances    MEDICATIONS  (STANDING):  Biotene Dry Mouth Oral Rinse 5 milliLiter(s) Swish and Spit four times a day  chlorhexidine 2% Cloths 1 Application(s) Topical daily  morphine  - Injectable 2 milliGRAM(s) IV Push every 6 hours  pantoprazole  Injectable 40 milliGRAM(s) IV Push two times a day    MEDICATIONS  (PRN):  acetaminophen   IVPB .. 1000 milliGRAM(s) IV Intermittent once PRN Mild Pain (1 - 3)  acetaminophen  Suppository .. 650 milliGRAM(s) Rectal every 8 hours PRN Temp greater or equal to 38C (100.4F), Mild Pain (1 - 3)  benzocaine 20% Spray 1 Spray(s) Topical three times a day PRN sore throat  bisacodyl Suppository 10 milliGRAM(s) Rectal daily PRN Constipation  glycopyrrolate Injectable 0.4 milliGRAM(s) IV Push every 6 hours PRN secretions  LORazepam   Injectable 0.25 milliGRAM(s) IV Push every 1 hour PRN Agitation/anxiety  morphine  - Injectable 4 milliGRAM(s) IV Push every 1 hour PRN Severe Pain (7 - 10)  morphine  - Injectable 2 milliGRAM(s) IV Push every 1 hour PRN Moderate Pain (4 - 6)  morphine  - Injectable 4 milliGRAM(s) IV Push every 1 hour PRN dyspnea    ITEMS UNCHECKED ARE NOT PRESENT    PRESENT SYMPTOMS: [X ]Unable to self-report see PAINAD, RDOS below  Source if other than patient:  [ ]Family   [ X]Team     Pain: [ ] yes [ ] no  QOL impact -   Location -                    Aggravating factors -  Quality -  Radiation -  Timing-  Severity (0-10 scale):  Minimal acceptable level (0-10 scale):     Dyspnea:                           [ ]Mild [ ]Moderate [ ]Severe  Anxiety:                             [ ]Mild [ ]Moderate [ ]Severe  Fatigue:                             [ ]Mild [ ]Moderate [ ]Severe  Nausea:                             [ ]Mild [ ]Moderate [ ]Severe  Loss of appetite:              [ ]Mild [ ]Moderate [ ]Severe  Constipation:                    [ ]Mild [ ]Moderate [ ]Severe    PCSSQ [Palliative Care Spiritual Screening Question]   Severity (0-10):  Score of 4 or > indicate consideration of Chaplaincy referral.  Chaplaincy Referral: [ ] yes [ ] refused [X ] following [ ] deferred Last seen on 11/20    Caregiver [place]? : [X ] yes [ ] no [ ] deferred:  Social work referral [X ] Patient & Family Centered Care Referral [ ]     Anticipatory Grief present?:  [X ] yes [ ] no [ ] deferred:  Social work referral [X ] Patient & Family Centered Care Referral [ ]  	  Other Symptoms:  [ ]All other review of systems negative- Unable to self report.      PHYSICAL EXAM:   Vital Signs Last 24 Hrs  T(C): 36.7 (27 Nov 2024 08:58), Max: 36.7 (27 Nov 2024 08:58)  T(F): 98.1 (27 Nov 2024 08:58), Max: 98.1 (27 Nov 2024 08:58)  HR: 95 (27 Nov 2024 08:58) (95 - 95)  BP: 99/50 (27 Nov 2024 08:58) (99/50 - 99/50)  BP(mean): --  RR: 20 (27 Nov 2024 08:58) (20 - 20)  SpO2: 75% (27 Nov 2024 08:58) (75% - 75%)    Parameters below as of 27 Nov 2024 08:58  Patient On (Oxygen Delivery Method): nasal cannula    I&O's Summary    GENERAL: [ ] Cachexia  [ ]Alert  [ ]Oriented x   [ X]Lethargic  [ ]Unarousable  [ ]Verbal  [ ]Non-Verbal  Behavioral:   [ ] Anxiety  [ ] Delirium [ ] Agitation [ ] Other  HEENT:  [ ]Normal   [X ]Dry mouth   [ ]ET Tube/Trach  [ ]Oral lesions  PULMONARY:   [ ]Clear [ ]Tachypnea  [ ]Audible excessive secretions   [ ]Rhonchi        [ ]Right [ ]Left [ ]Bilateral  [ ]Crackles        [ ]Right [ ]Left [ ]Bilateral  [ ]Wheezing     [ ]Right [ ]Left [ ]Bilateral  [X ]Diminished BS [ ]Right [ ]Left [ ]Bilateral    CARDIOVASCULAR:    [ X]Regular [ ]Irregular [ ]Tachy  [ ]Emery [ ]Murmur [ ]Other  GASTROINTESTINAL:  [ X]Soft  [ ]Distended   [ X]+BS  [ X]Non tender [ ]Tender  [ ]Other [ ]PEG [ ]OGT/ NGT   Last BM:  11/25  GENITOURINARY:  [ ]Normal [X ] Incontinent   [ ]Oliguria/Anuria   [X ]Vilchis  MUSCULOSKELETAL:   [ ]Normal   [ X]Weakness  [ ]Bed/Wheelchair bound [ ]Edema  NEUROLOGIC:   [ ]No focal deficits  [ ] Cognitive impairment  [ ] Dysphagia [ ]Dysarthria [ ] Paresis [ ]Other   SKIN:   [ ]Normal  [ ]Rash  [ ]Other  [X ]Pressure ulcer(s)  [ ]y [ ]n  Present on admission  sacral, b/l heel DTI. Please see nursing assessment for further details for which I have reviewed.      CRITICAL CARE:  [ ] Shock Present  [ ]Septic [ ]Cardiogenic [ ]Neurologic [ ]Hypovolemic  [ ]  Vasopressors [ ]  Inotropes   [ ] Respiratory failure present [ ] Mechanical Ventilation [ ] Non-invasive ventilatory support [ ] High-Flow  [ ] Acute  [ ] Chronic [ ] Hypoxic  [ ] Hypercarbic [ ] Other  [X ] Other organ failure- Skin    LABS: None new.     RADIOLOGY & ADDITIONAL STUDIES: None new. .     PROTEIN CALORIE MALNUTRITION: [ ] mild [ ] moderate [X ] severe- Please see the nutritionist note.  [ ] underweight [ ] morbid obesity    https://www.andeal.org/vault/2440/web/files/ONC/Table_Clinical%20Characteristics%20to%20Document%20Malnutrition-White%20JV%20et%20al%445365.pdf      Height (cm): 152.4 (11-11-24 @ 21:00), 149.9 (11-11-24 @ 13:29), 149.9 (11-01-24 @ 10:52)  Weight (kg): 51.9 (11-11-24 @ 21:00), 49 (11-11-24 @ 13:29), 47.6 (11-01-24 @ 10:52)  BMI (kg/m2): 22.3 (11-11-24 @ 21:00), 21.8 (11-11-24 @ 13:29), 21.2 (11-01-24 @ 10:52)    [ X] PPSV2 < or = 30% [ ] significant weight loss [ ] poor nutritional intake [ ] anasarca   Artificial Nutrition [ ]     Other REFERRALS:    [ ] Hospice  [ ]Child Life  [X ]Social Work  [ ]Case management [ ]Holistic Therapy [ ] Physical Therapy [ ] Dietary     Progress Notes - Care Coordination [C. Provider] (11-26-24 @ 15:28)      Palliative Performance Scale:  http://npcrc.org/files/news/palliative_performance_scale_ppsv2.pdf  (Ctrl +  left click to view)  Respiratory Distress Observation Tool:  https://homecareinformation.net/handouts/hen/Respiratory_Distress_Observation_Scale.pdf (Ctrl +  left click to view)  PAINAD Score:  http://geriatrictoolkit.[email or web address]/cog/painad.pdf (Ctrl +  left click to view)   GAP TEAM PALLIATIVE CARE UNIT PROGRESS NOTE:      [  ] Patient on hospice program.    INDICATION FOR PALLIATIVE CARE UNIT SERVICES/Interval HPI: Symptom management in the setting of a 75y/o F with pancreatic cancer.  P/w SOB, melena found to have saddle PE resulting in AHRF requiring NIPVV. Now off NIPVV    OVERNIGHT EVENTS: Chart reviewed. The patient is seen and examined at the bedside. She required PRN Morphine 2mg IV X1 for moderate pain within a 24hr period 7am-7am.    DNR on chart: yes  DNI      Allergies    No Known Allergies    Intolerances    MEDICATIONS  (STANDING):  Biotene Dry Mouth Oral Rinse 5 milliLiter(s) Swish and Spit four times a day  chlorhexidine 2% Cloths 1 Application(s) Topical daily  morphine  - Injectable 2 milliGRAM(s) IV Push every 6 hours  pantoprazole  Injectable 40 milliGRAM(s) IV Push two times a day    MEDICATIONS  (PRN):  acetaminophen   IVPB .. 1000 milliGRAM(s) IV Intermittent once PRN Mild Pain (1 - 3)  acetaminophen  Suppository .. 650 milliGRAM(s) Rectal every 8 hours PRN Temp greater or equal to 38C (100.4F), Mild Pain (1 - 3)  benzocaine 20% Spray 1 Spray(s) Topical three times a day PRN sore throat  bisacodyl Suppository 10 milliGRAM(s) Rectal daily PRN Constipation  glycopyrrolate Injectable 0.4 milliGRAM(s) IV Push every 6 hours PRN secretions  LORazepam   Injectable 0.25 milliGRAM(s) IV Push every 1 hour PRN Agitation/anxiety  morphine  - Injectable 4 milliGRAM(s) IV Push every 1 hour PRN Severe Pain (7 - 10)  morphine  - Injectable 2 milliGRAM(s) IV Push every 1 hour PRN Moderate Pain (4 - 6)  morphine  - Injectable 4 milliGRAM(s) IV Push every 1 hour PRN dyspnea    ITEMS UNCHECKED ARE NOT PRESENT    PRESENT SYMPTOMS: [X ]Unable to self-report see PAINAD, RDOS below  Source if other than patient:  [ ]Family   [ X]Team     Pain: [ ] yes [ ] no  QOL impact -   Location -                    Aggravating factors -  Quality -  Radiation -  Timing-  Severity (0-10 scale):  Minimal acceptable level (0-10 scale):     Dyspnea:                           [ ]Mild [ ]Moderate [ ]Severe  Anxiety:                             [ ]Mild [ ]Moderate [ ]Severe  Fatigue:                             [ ]Mild [ ]Moderate [ ]Severe  Nausea:                             [ ]Mild [ ]Moderate [ ]Severe  Loss of appetite:              [ ]Mild [ ]Moderate [ ]Severe  Constipation:                    [ ]Mild [ ]Moderate [ ]Severe    PCSSQ [Palliative Care Spiritual Screening Question]   Severity (0-10):  Score of 4 or > indicate consideration of Chaplaincy referral.  Chaplaincy Referral: [ ] yes [ ] refused [X ] following [ ] deferred Last seen on 11/20    Caregiver [place]? : [X ] yes [ ] no [ ] deferred:  Social work referral [X ] Patient & Family Centered Care Referral [ ]     Anticipatory Grief present?:  [X ] yes [ ] no [ ] deferred:  Social work referral [X ] Patient & Family Centered Care Referral [ ]  	  Other Symptoms:  [ ]All other review of systems negative- Unable to self report.      PHYSICAL EXAM:   Vital Signs Last 24 Hrs  T(C): 36.7 (27 Nov 2024 08:58), Max: 36.7 (27 Nov 2024 08:58)  T(F): 98.1 (27 Nov 2024 08:58), Max: 98.1 (27 Nov 2024 08:58)  HR: 95 (27 Nov 2024 08:58) (95 - 95)  BP: 99/50 (27 Nov 2024 08:58) (99/50 - 99/50)  BP(mean): --  RR: 20 (27 Nov 2024 08:58) (20 - 20)  SpO2: 75% (27 Nov 2024 08:58) (75% - 75%)    Parameters below as of 27 Nov 2024 08:58  Patient On (Oxygen Delivery Method): nasal cannula    I&O's Summary    GENERAL: [ ] Cachexia  [ ]Alert  [ ]Oriented x   [ X]Lethargic  [ ]Unarousable  [ ]Verbal  [ ]Non-Verbal  Behavioral:   [ ] Anxiety  [ ] Delirium [ ] Agitation [ ] Other  HEENT:  [ ]Normal   [X ]Dry mouth   [ ]ET Tube/Trach  [ ]Oral lesions  PULMONARY:   [ ]Clear [ ]Tachypnea  [ ]Audible excessive secretions   [ ]Rhonchi        [ ]Right [ ]Left [ ]Bilateral  [ ]Crackles        [ ]Right [ ]Left [ ]Bilateral  [ ]Wheezing     [ ]Right [ ]Left [ ]Bilateral  [X ]Diminished BS [ ]Right [ ]Left [ ]Bilateral    CARDIOVASCULAR:    [ X]Regular [ ]Irregular [ ]Tachy  [ ]Emery [ ]Murmur [ ]Other  GASTROINTESTINAL:  [ X]Soft  [ ]Distended   [ X]+BS  [ X]Non tender [ ]Tender  [ ]Other [ ]PEG [ ]OGT/ NGT   Last BM:  11/25  GENITOURINARY:  [ ]Normal [X ] Incontinent   [ ]Oliguria/Anuria   [X ]Vilchis  MUSCULOSKELETAL:   [ ]Normal   [ X]Weakness  [ ]Bed/Wheelchair bound [ ]Edema  NEUROLOGIC:   [ ]No focal deficits  [ ] Cognitive impairment  [ ] Dysphagia [ ]Dysarthria [ ] Paresis [ ]Other   SKIN:   [ ]Normal  [ ]Rash  [ ]Other  [X ]Pressure ulcer(s)  [ ]y [ ]n  Present on admission  sacral, b/l heel DTI. Please see nursing assessment for further details for which I have reviewed.      CRITICAL CARE:  [ ] Shock Present  [ ]Septic [ ]Cardiogenic [ ]Neurologic [ ]Hypovolemic  [ ]  Vasopressors [ ]  Inotropes   [ ] Respiratory failure present [ ] Mechanical Ventilation [ ] Non-invasive ventilatory support [ ] High-Flow  [ ] Acute  [ ] Chronic [ ] Hypoxic  [ ] Hypercarbic [ ] Other  [X ] Other organ failure- Skin    LABS: None new.     RADIOLOGY & ADDITIONAL STUDIES: None new. .     PROTEIN CALORIE MALNUTRITION: [ ] mild [ ] moderate [X ] severe- Please see the nutritionist note.  [ ] underweight [ ] morbid obesity    https://www.andeal.org/vault/2440/web/files/ONC/Table_Clinical%20Characteristics%20to%20Document%20Malnutrition-White%20JV%20et%20al%354229.pdf      Height (cm): 152.4 (11-11-24 @ 21:00), 149.9 (11-11-24 @ 13:29), 149.9 (11-01-24 @ 10:52)  Weight (kg): 51.9 (11-11-24 @ 21:00), 49 (11-11-24 @ 13:29), 47.6 (11-01-24 @ 10:52)  BMI (kg/m2): 22.3 (11-11-24 @ 21:00), 21.8 (11-11-24 @ 13:29), 21.2 (11-01-24 @ 10:52)    [ X] PPSV2 < or = 30% [ ] significant weight loss [ ] poor nutritional intake [ ] anasarca   Artificial Nutrition [ ]     Other REFERRALS:    [ ] Hospice  [ ]Child Life  [X ]Social Work  [ ]Case management [ ]Holistic Therapy [ ] Physical Therapy [ ] Dietary     Progress Notes - Care Coordination [C. Provider] (11-26-24 @ 15:28)      Palliative Performance Scale:  http://npcrc.org/files/news/palliative_performance_scale_ppsv2.pdf  (Ctrl +  left click to view)  Respiratory Distress Observation Tool:  https://homecareinformation.net/handouts/hen/Respiratory_Distress_Observation_Scale.pdf (Ctrl +  left click to view)  PAINAD Score:  http://[email or web address].[facility]/cog/painad.pdf (Ctrl +  left click to view)

## 2024-11-27 NOTE — PROGRESS NOTE ADULT - NS ATTEND AMEND GEN_ALL_CORE FT
Hpi Title: Evaluation of Skin Lesions 74 year old woman w/ PMHx of pancreatic cancer on chemotherapy, DVT on Eliquis 2.5mg BID, HTN, recent admission for symptomatic anemia requiring blood transfusion. Now presenting with SOB as well as melena s/p transfusion 11/11, found to have saddle PE and started on HFNC.   Patient transferred to PCU for management of sx and disposition planning.       In the setting of parenteral controlled substance administration, clinical monitoring required for side effects such as respiratory depression, constipation and opioid induced neurotoxicity due to organ failure.    Patient will not be transitioned to hospice due to hypoxemia.      Family at bedside, updated as to current clinical condition and plan of care.  Educated as to what to expect, questions answered and emotional support provided.    Patient assessment and plan discussed on interdisciplinary team rounds today.

## 2024-11-27 NOTE — PROGRESS NOTE ADULT - PROBLEM SELECTOR PLAN 2
- Continue with Morphine 2mg IV q6hr ATC   - Continue with Morphine 2mg IV q1hr PRN for moderate pain. ( None required in a 24hr period 7am-7am)   - Continue with Morphine 4mg IV q1hr PRN for severe pain. ( None required in a 24hr period 7am-7am)   - Bowel regimen while on opioids

## 2024-11-27 NOTE — PROGRESS NOTE ADULT - PROBLEM SELECTOR PLAN 1
- 2/2 PE   - Continue with Morphine 2mg IV q6hr ATC.  - Continue with Morphine 4mg IV q1hr PRN. ( None required in a 24hr period 7am-7am)   - Bowel regimen while on opioids

## 2024-11-27 NOTE — PROGRESS NOTE ADULT - PROBLEM SELECTOR PLAN 6
- Patient's family member at the bedside. Emotional support provided.   - I called the patient's son, Shon. Updated as to current clinical condition and plan of care. Educated as to what to expect, questions answered and emotional support provided.

## 2024-11-28 NOTE — PROGRESS NOTE ADULT - PROBLEM SELECTOR PLAN 6
- Patient's family member at the bedside. Emotional support provided.   - I called the patient's son, Shon. Updated as to current clinical condition and plan of care. Educated as to what to expect, questions answered and emotional support provided. - c/w comfort directed care in the PCU, dispo dependent on clinical course and sxs management - c/w comfort directed care in the PCU, dispo dependent on clinical course and sxs management  - family updated at bedside, medical updates and emotional support provided, all questions and concerns addressed.

## 2024-11-28 NOTE — PROGRESS NOTE ADULT - SUBJECTIVE AND OBJECTIVE BOX
GAP TEAM PALLIATIVE CARE UNIT PROGRESS NOTE:      [  ] Patient on hospice program.    INDICATION FOR PALLIATIVE CARE UNIT SERVICES/Interval HPI: Symptom management in the setting of a 75y/o F with pancreatic cancer.  P/w SOB, melena found to have saddle PE resulting in AHRF requiring NIPVV. Now off NIPVV    OVERNIGHT EVENTS: Chart reviewed. The patient is seen and examined at the bedside. She required PRN Morphine 2mg IV X1 for moderate pain within a 24hr period 7am-7am.    DNR on chart: yes  DNI      Allergies    No Known Allergies    Intolerances    MEDICATIONS  (STANDING):  Biotene Dry Mouth Oral Rinse 5 milliLiter(s) Swish and Spit four times a day  chlorhexidine 2% Cloths 1 Application(s) Topical daily  morphine  - Injectable 2 milliGRAM(s) IV Push every 6 hours  pantoprazole  Injectable 40 milliGRAM(s) IV Push two times a day    MEDICATIONS  (PRN):  acetaminophen   IVPB .. 1000 milliGRAM(s) IV Intermittent once PRN Mild Pain (1 - 3)  acetaminophen  Suppository .. 650 milliGRAM(s) Rectal every 8 hours PRN Temp greater or equal to 38C (100.4F), Mild Pain (1 - 3)  benzocaine 20% Spray 1 Spray(s) Topical three times a day PRN sore throat  bisacodyl Suppository 10 milliGRAM(s) Rectal daily PRN Constipation  glycopyrrolate Injectable 0.4 milliGRAM(s) IV Push every 6 hours PRN secretions  LORazepam   Injectable 0.25 milliGRAM(s) IV Push every 1 hour PRN Agitation/anxiety  morphine  - Injectable 4 milliGRAM(s) IV Push every 1 hour PRN Severe Pain (7 - 10)  morphine  - Injectable 2 milliGRAM(s) IV Push every 1 hour PRN Moderate Pain (4 - 6)  morphine  - Injectable 4 milliGRAM(s) IV Push every 1 hour PRN dyspnea    ITEMS UNCHECKED ARE NOT PRESENT    PRESENT SYMPTOMS: [X ]Unable to self-report see PAINAD, RDOS below  Source if other than patient:  [ ]Family   [ X]Team     Pain: [ ] yes [ ] no  QOL impact -   Location -                    Aggravating factors -  Quality -  Radiation -  Timing-  Severity (0-10 scale):  Minimal acceptable level (0-10 scale):     Dyspnea:                           [ ]Mild [ ]Moderate [ ]Severe  Anxiety:                             [ ]Mild [ ]Moderate [ ]Severe  Fatigue:                             [ ]Mild [ ]Moderate [ ]Severe  Nausea:                             [ ]Mild [ ]Moderate [ ]Severe  Loss of appetite:              [ ]Mild [ ]Moderate [ ]Severe  Constipation:                    [ ]Mild [ ]Moderate [ ]Severe    PCSSQ [Palliative Care Spiritual Screening Question]   Severity (0-10):  Score of 4 or > indicate consideration of Chaplaincy referral.  Chaplaincy Referral: [ ] yes [ ] refused [X ] following [ ] deferred Last seen on 11/20    Caregiver [place]? : [X ] yes [ ] no [ ] deferred:  Social work referral [X ] Patient & Family Centered Care Referral [ ]     Anticipatory Grief present?:  [X ] yes [ ] no [ ] deferred:  Social work referral [X ] Patient & Family Centered Care Referral [ ]  	  Other Symptoms:  [ ]All other review of systems negative- Unable to self report.      PHYSICAL EXAM:   Vital Signs Last 24 Hrs  T(C): 36.7 (27 Nov 2024 08:58), Max: 36.7 (27 Nov 2024 08:58)  T(F): 98.1 (27 Nov 2024 08:58), Max: 98.1 (27 Nov 2024 08:58)  HR: 95 (27 Nov 2024 08:58) (95 - 95)  BP: 99/50 (27 Nov 2024 08:58) (99/50 - 99/50)  BP(mean): --  RR: 20 (27 Nov 2024 08:58) (20 - 20)  SpO2: 75% (27 Nov 2024 08:58) (75% - 75%)    Parameters below as of 27 Nov 2024 08:58  Patient On (Oxygen Delivery Method): nasal cannula    I&O's Summary    GENERAL: [ ] Cachexia  [ ]Alert  [ ]Oriented x   [ X]Lethargic  [ ]Unarousable  [ ]Verbal  [ ]Non-Verbal  Behavioral:   [ ] Anxiety  [ ] Delirium [ ] Agitation [ ] Other  HEENT:  [ ]Normal   [X ]Dry mouth   [ ]ET Tube/Trach  [ ]Oral lesions  PULMONARY:   [ ]Clear [ ]Tachypnea  [ ]Audible excessive secretions   [ ]Rhonchi        [ ]Right [ ]Left [ ]Bilateral  [ ]Crackles        [ ]Right [ ]Left [ ]Bilateral  [ ]Wheezing     [ ]Right [ ]Left [ ]Bilateral  [X ]Diminished BS [ ]Right [ ]Left [ ]Bilateral    CARDIOVASCULAR:    [ X]Regular [ ]Irregular [ ]Tachy  [ ]Emery [ ]Murmur [ ]Other  GASTROINTESTINAL:  [ X]Soft  [ ]Distended   [ X]+BS  [ X]Non tender [ ]Tender  [ ]Other [ ]PEG [ ]OGT/ NGT   Last BM:  11/25  GENITOURINARY:  [ ]Normal [X ] Incontinent   [ ]Oliguria/Anuria   [X ]Vilchis  MUSCULOSKELETAL:   [ ]Normal   [ X]Weakness  [ ]Bed/Wheelchair bound [ ]Edema  NEUROLOGIC:   [ ]No focal deficits  [ ] Cognitive impairment  [ ] Dysphagia [ ]Dysarthria [ ] Paresis [ ]Other   SKIN:   [ ]Normal  [ ]Rash  [ ]Other  [X ]Pressure ulcer(s)  [ ]y [ ]n  Present on admission  sacral, b/l heel DTI. Please see nursing assessment for further details for which I have reviewed.      CRITICAL CARE:  [ ] Shock Present  [ ]Septic [ ]Cardiogenic [ ]Neurologic [ ]Hypovolemic  [ ]  Vasopressors [ ]  Inotropes   [ ] Respiratory failure present [ ] Mechanical Ventilation [ ] Non-invasive ventilatory support [ ] High-Flow  [ ] Acute  [ ] Chronic [ ] Hypoxic  [ ] Hypercarbic [ ] Other  [X ] Other organ failure- Skin    LABS: None new.     RADIOLOGY & ADDITIONAL STUDIES: None new. .     PROTEIN CALORIE MALNUTRITION: [ ] mild [ ] moderate [X ] severe- Please see the nutritionist note.  [ ] underweight [ ] morbid obesity    https://www.andeal.org/vault/2440/web/files/ONC/Table_Clinical%20Characteristics%20to%20Document%20Malnutrition-White%20JV%20et%20al%752134.pdf      Height (cm): 152.4 (11-11-24 @ 21:00), 149.9 (11-11-24 @ 13:29), 149.9 (11-01-24 @ 10:52)  Weight (kg): 51.9 (11-11-24 @ 21:00), 49 (11-11-24 @ 13:29), 47.6 (11-01-24 @ 10:52)  BMI (kg/m2): 22.3 (11-11-24 @ 21:00), 21.8 (11-11-24 @ 13:29), 21.2 (11-01-24 @ 10:52)    [ X] PPSV2 < or = 30% [ ] significant weight loss [ ] poor nutritional intake [ ] anasarca   Artificial Nutrition [ ]     Other REFERRALS:    [ ] Hospice  [ ]Child Life  [X ]Social Work  [ ]Case management [ ]Holistic Therapy [ ] Physical Therapy [ ] Dietary     Progress Notes - Care Coordination [C. Provider] (11-26-24 @ 15:28)      Palliative Performance Scale:  http://npcrc.org/files/news/palliative_performance_scale_ppsv2.pdf  (Ctrl +  left click to view)  Respiratory Distress Observation Tool:  https://homecareinformation.net/handouts/hen/Respiratory_Distress_Observation_Scale.pdf (Ctrl +  left click to view)  PAINAD Score:  http://geriatrictoolkit.[place].[facility]/cog/painad.pdf (Ctrl +  left click to view)   GAP TEAM PALLIATIVE CARE UNIT PROGRESS NOTE:      [  ] Patient on hospice program.    INDICATION FOR PALLIATIVE CARE UNIT SERVICES/Interval HPI: Symptom management in the setting of a 73y/o F with pancreatic cancer.  P/w SOB, melena found to have saddle PE resulting in AHRF requiring NIPVV. Now off NIPVV    OVERNIGHT EVENTS: Chart reviewed. The patient is seen and examined at the bedside. 24hr PRN use reviewed 7AM to 7AM: none required.     DNR on chart: yes  DNI    Allergies  No Known Allergies  Intolerances    MEDICATIONS  (STANDING):  Biotene Dry Mouth Oral Rinse 5 milliLiter(s) Swish and Spit four times a day  chlorhexidine 2% Cloths 1 Application(s) Topical daily  morphine  - Injectable 2 milliGRAM(s) IV Push every 6 hours  pantoprazole  Injectable 40 milliGRAM(s) IV Push two times a day    MEDICATIONS  (PRN):  acetaminophen   IVPB .. 1000 milliGRAM(s) IV Intermittent once PRN Mild Pain (1 - 3)  acetaminophen  Suppository .. 650 milliGRAM(s) Rectal every 8 hours PRN Temp greater or equal to 38C (100.4F), Mild Pain (1 - 3)  benzocaine 20% Spray 1 Spray(s) Topical three times a day PRN sore throat  bisacodyl Suppository 10 milliGRAM(s) Rectal daily PRN Constipation  glycopyrrolate Injectable 0.4 milliGRAM(s) IV Push every 6 hours PRN secretions  LORazepam   Injectable 0.25 milliGRAM(s) IV Push every 1 hour PRN Agitation/anxiety  morphine  - Injectable 4 milliGRAM(s) IV Push every 1 hour PRN Severe Pain (7 - 10)  morphine  - Injectable 2 milliGRAM(s) IV Push every 1 hour PRN Moderate Pain (4 - 6)  morphine  - Injectable 4 milliGRAM(s) IV Push every 1 hour PRN dyspnea      ITEMS UNCHECKED ARE NOT PRESENT    PRESENT SYMPTOMS: [X ]Unable to self-report see PAINAD, RDOS below  Source if other than patient:  [ ]Family   [ X]Team     Pain: [ ] yes [ ] no  QOL impact -   Location -                    Aggravating factors -  Quality -  Radiation -  Timing-  Severity (0-10 scale):  Minimal acceptable level (0-10 scale):     Dyspnea:                           [ ]Mild [ ]Moderate [ ]Severe  Anxiety:                             [ ]Mild [ ]Moderate [ ]Severe  Fatigue:                             [ ]Mild [ ]Moderate [ ]Severe  Nausea:                             [ ]Mild [ ]Moderate [ ]Severe  Loss of appetite:              [ ]Mild [ ]Moderate [ ]Severe  Constipation:                    [ ]Mild [ ]Moderate [ ]Severe    PCSSQ [Palliative Care Spiritual Screening Question]   Severity (0-10):  Score of 4 or > indicate consideration of Chaplaincy referral.  Chaplaincy Referral: [ ] yes [ ] refused [X ] following [ ] deferred Last seen on 11/20    Caregiver [place]? : [X ] yes [ ] no [ ] deferred:  Social work referral [X ] Patient & Family Centered Care Referral [ ]     Anticipatory Grief present?:  [X ] yes [ ] no [ ] deferred:  Social work referral [X ] Patient & Family Centered Care Referral [ ]  	  Other Symptoms:  [ ]All other review of systems negative- Unable to self report.      PHYSICAL EXAM:   Vital Signs Last 24 Hrs  T(C): 37.1 (28 Nov 2024 09:15), Max: 37.1 (28 Nov 2024 09:15)  T(F): 98.7 (28 Nov 2024 09:15), Max: 98.7 (28 Nov 2024 09:15)  HR: 93 (28 Nov 2024 09:15) (93 - 93)  BP: 108/62 (28 Nov 2024 09:15) (108/62 - 108/62)  BP(mean): --  RR: 18 (28 Nov 2024 09:15) (18 - 18)  SpO2: 87% (28 Nov 2024 09:15) (87% - 87%)    Parameters below as of 28 Nov 2024 09:15  Patient On (Oxygen Delivery Method): nasal cannula  I&O's Summary    GENERAL: [ ] Cachexia  [ ]Alert  [ ]Oriented x   [ X]Lethargic  [ ]Unarousable  [ ]Verbal  [ ]Non-Verbal  Behavioral:   [ ] Anxiety  [ ] Delirium [ ] Agitation [ ] Other  HEENT:  [ ]Normal   [X ]Dry mouth   [ ]ET Tube/Trach  [ ]Oral lesions  PULMONARY:   [ ]Clear [ ]Tachypnea  [ ]Audible excessive secretions   [ ]Rhonchi        [ ]Right [ ]Left [ ]Bilateral  [ ]Crackles        [ ]Right [ ]Left [ ]Bilateral  [ ]Wheezing     [ ]Right [ ]Left [ ]Bilateral  [X ]Diminished BS [ ]Right [ ]Left [ ]Bilateral    CARDIOVASCULAR:    [ X]Regular [ ]Irregular [ ]Tachy  [ ]Emery [ ]Murmur [ ]Other  GASTROINTESTINAL:  [ X]Soft  [ ]Distended   [ X]+BS  [ X]Non tender [ ]Tender  [ ]Other [ ]PEG [ ]OGT/ NGT   Last BM:  11/25  GENITOURINARY:  [ ]Normal [X ] Incontinent   [ ]Oliguria/Anuria   [X ]Vilchis  MUSCULOSKELETAL:   [ ]Normal   [ X]Weakness  [ ]Bed/Wheelchair bound [ ]Edema  NEUROLOGIC:   [ ]No focal deficits  [ ] Cognitive impairment  [ ] Dysphagia [ ]Dysarthria [ ] Paresis [ ]Other   SKIN:   [ ]Normal  [ ]Rash  [ ]Other  [X ]Pressure ulcer(s)  [ ]y [ ]n  Present on admission  sacral, b/l heel DTI. Please see nursing assessment for further details for which I have reviewed.      CRITICAL CARE:  [ ] Shock Present  [ ]Septic [ ]Cardiogenic [ ]Neurologic [ ]Hypovolemic  [ ]  Vasopressors [ ]  Inotropes   [ ] Respiratory failure present [ ] Mechanical Ventilation [ ] Non-invasive ventilatory support [ ] High-Flow  [ ] Acute  [ ] Chronic [ ] Hypoxic  [ ] Hypercarbic [ ] Other  [X ] Other organ failure- Skin    LABS: None new.     RADIOLOGY & ADDITIONAL STUDIES: None new. .     PROTEIN CALORIE MALNUTRITION: [ ] mild [ ] moderate [X ] severe- Please see the nutritionist note.  [ ] underweight [ ] morbid obesity    https://www.andeal.org/vault/2440/web/files/ONC/Table_Clinical%20Characteristics%20to%20Document%20Malnutrition-White%20JV%20et%20al%719988.pdf      Height (cm): 152.4 (11-11-24 @ 21:00), 149.9 (11-11-24 @ 13:29), 149.9 (11-01-24 @ 10:52)  Weight (kg): 51.9 (11-11-24 @ 21:00), 49 (11-11-24 @ 13:29), 47.6 (11-01-24 @ 10:52)  BMI (kg/m2): 22.3 (11-11-24 @ 21:00), 21.8 (11-11-24 @ 13:29), 21.2 (11-01-24 @ 10:52)    [ X] PPSV2 < or = 30% [ ] significant weight loss [ ] poor nutritional intake [ ] anasarca   Artificial Nutrition [ ]     Other REFERRALS:    [ ] Hospice  [ ]Child Life  [X ]Social Work  [ ]Case management [ ]Holistic Therapy [ ] Physical Therapy [ ] Dietary     Progress Notes - Care Coordination [C. Provider] (11-26-24 @ 15:28)      Palliative Performance Scale:  http://npcrc.org/files/news/palliative_performance_scale_ppsv2.pdf  (Ctrl +  left click to view)  Respiratory Distress Observation Tool:  https://homecareinformation.net/handouts/hen/Respiratory_Distress_Observation_Scale.pdf (Ctrl +  left click to view)  PAINAD Score:  http://geriatrictoolkit.[place].[facility]/cog/painad.pdf (Ctrl +  left click to view)

## 2024-11-29 NOTE — PROGRESS NOTE ADULT - ASSESSMENT
74 year old woman w/ PMHx of pancreatic cancer on chemotherapy, DVT on Eliquis 2.5mg BID, HTN, recent admission for symptomatic anemia requiring blood transfusion. Admitted with SOB as well as melena s/p transfusion 11/11, found to have saddle PE and started on HFNC.   Patient transferred to PCU for management of sx and disposition planning.  Patient successfully weaned off HFNC and is on oxygen via nc.

## 2024-11-29 NOTE — PROGRESS NOTE ADULT - PROBLEM SELECTOR PLAN 6
Procedures signed by Anabel Wilkerson MD at 1/21/2016  7:01 PM       Author:  Anabel Wilkerson MD Service:  Electrophysiology Author Type:  Physician     Filed:  1/21/2016  7:01 PM Date of Service:  1/19/2016  6:58 AM Status:  Signed     :  Anabel Wilkerson MD (Physician)            Omayra Gama  [id number]  INVASIVE CARDIOLOGY  01/18/2016    PREOPERATIVE DIAGNOSIS  Complete heart block, temporary pacemaker put in through right   subclavian  POSTOPERATIVE DIAGNOSIS  Complete heart block, temporary pacemaker put in through right   subclavian  PROCEDURE PERFORMED  Dual chamber permanent pacemaker  Minesh Hemphill MD    ASSISTANT  None  SPECIMENS  None  ESTIMATED BLOOD LOSS       20 mL    FINDINGS  None  COMPLICATIONS  None  ANESTHESIA  Modified anesthetic care plus local lidocaine  DEVICE:   Model:    Senior [employer] MRI SureScan [id number]            Serial#:  [id number]    RV LEAD:  Model:    Medtronic 5076 - 58 cm            Serial#:  [id number]  MRI conditional lead    RA LEAD:  Model:    Medtronic 5076 - 52 cm            Serial#:  [id number]  MRI conditional lead    INTERROGATION THROUGH PSA  RA:  P wave         1 8 mV  Impedance      551 ohms  Capture        1 2 V @ 0 5 ms    RV:  R wave         4 8 mV  Capture        0 5 V @ 0 5 ms  Impedance      904 ohms    MEASUREMENT THROUGH DEVICE  RA:  Impedance           380 ohms  Capture threshold   1 25 V @ 0 4 ms  P wave              1 1 mV    RV:  R wave              4 1 mV  Capture threshold   0 5 V @ 0 4 ms  Impedance           608 ohms    DEVICE SETUP  AAI-DDD which is MVP mode  Lower rate     50  Upper tracking rate      130    DETAILS OF PROCEDURE  The patient was seen before the procedure  She had come in with   complete heart block and right bundle branch block  She had a   temporary pacemaker placed    When I first saw her this morning, she   had actually recovered to first degree AV block, right bundle branch   block at a heart rate of around 70  She is right-handed and hence we are going to do a left-sided device  She is only 68years old and hence we chose MRI compatible device  She is not allergic to contrast dye  She is allergic to ampicillin  Hence it is safe to use vancomycin  The patient was put under anesthesia  Proper time-out was done  Sterile dressing and draping was done  Lidocaine was infiltrated over   the left infraclavicular region  Dissection was done initially with   sharp blade and thereafter electrocautery and blunt dissection to open   up a prepectoral pocket  Under direct visualization, using 20 cc of contrast followed by 20 cc   of saline 2 guidewires were passed into the axillary vein  This was an   extrathoracic axillary vein puncture  Both guidewires were taken to   the inferior vena cava to confirm that they were on the right side  This was also checked by NEVILLE and [place] views  A 7 [place]ese sheath was passed over the glidewire  Right ventricular   lead was taken to the outflow tract and thereafter floated into the   apex  Position was confirmed in NEVILLE and [place] views  Lead was screwed   in  Sheath was removed  The lead was sutured to the prepectoral   fascia  Now over the other guidewire, a 7 [place]ese sheath was passed  Through   this an atrial lead was passed  It was taken to the right atrial   appendage  Position was checked in NEVILLE and [place] views  There was   adequate sensing and threshold  It was screwed into the right atrial   appendage  Sheath was removed  It was sutured to the prepectoral   fascia  The pocket was irrigated with large amount of antibiotic saline  Both   leads were connected to the generator  Generator was sutured to the   prepectoral fascia  Generator was checked and all parameters looked   stable  At this point, the temporary wire was taken off under direct   fluoroscopic guidance    It did not hamper either movement or position of either atrial or ventricular lead  The pocket was closed in 3 layers  Steri-strips, Telfa and Tegaderm   was used  A modified pressure bandage was put on  The patient   tolerated the procedure well  SUMMARY  This is a pleasant 77-year-old lady who came in with complete heart   block, had temporary placement  She has undergone a permanent   pacemaker on the left side  She has tolerated the procedure well          se  [id number]  D:  01/18/2016 15:36:19  Dictated by:  Dejuan Cui MD  T:  01/19/2016 06:58:56               Received Erik DELGADO    Jan 21 2016  7:02PM [facility] Standard Time - c/w comfort directed care in the PCU, dispo dependent on clinical course and sxs management  - family updated at bedside, medical updates and emotional support provided, all questions and concerns addressed.

## 2024-11-29 NOTE — PROGRESS NOTE ADULT - SUBJECTIVE AND OBJECTIVE BOX
GAP TEAM PALLIATIVE CARE UNIT PROGRESS NOTE:      [  ] Patient on hospice program.    INDICATION FOR PALLIATIVE CARE UNIT SERVICES/Interval HPI:  74 year old woman w/ PMHx of pancreatic cancer on chemotherapy, DVT on Eliquis 2.5mg BID, HTN, recent admission for symptomatic anemia requiring blood transfusion. Admitted with SOB as well as melena s/p transfusion 11/11, found to have saddle PE and started on HFNC.   Patient transferred to PCU for management of sx and disposition planning.  Patient successfully weaned off HFNC and is on oxygen via nc.        OVERNIGHT EVENTS:  On morphine ATC for management of symptoms - pain and dyspnea.  Moaning upon examination this am.  Family member at bedside.  No prns required overnight.      DNR on chart: yes  DNI    Allergies    No Known Allergies    Intolerances    MEDICATIONS  (STANDING):  Biotene Dry Mouth Oral Rinse 5 milliLiter(s) Swish and Spit four times a day  chlorhexidine 2% Cloths 1 Application(s) Topical daily  morphine  - Injectable 2 milliGRAM(s) IV Push every 4 hours  pantoprazole  Injectable 40 milliGRAM(s) IV Push daily    MEDICATIONS  (PRN):  acetaminophen   IVPB .. 1000 milliGRAM(s) IV Intermittent once PRN Mild Pain (1 - 3)  acetaminophen  Suppository .. 650 milliGRAM(s) Rectal every 8 hours PRN Temp greater or equal to 38C (100.4F), Mild Pain (1 - 3)  benzocaine 20% Spray 1 Spray(s) Topical three times a day PRN sore throat  bisacodyl Suppository 10 milliGRAM(s) Rectal daily PRN Constipation  glycopyrrolate Injectable 0.4 milliGRAM(s) IV Push every 6 hours PRN secretions  LORazepam   Injectable 0.25 milliGRAM(s) IV Push every 1 hour PRN Agitation/anxiety  morphine  - Injectable 4 milliGRAM(s) IV Push every 1 hour PRN Severe Pain (7 - 10)  morphine  - Injectable 2 milliGRAM(s) IV Push every 1 hour PRN Moderate Pain (4 - 6)  morphine  - Injectable 4 milliGRAM(s) IV Push every 1 hour PRN dyspnea    ITEMS UNCHECKED ARE NOT PRESENT    PRESENT SYMPTOMS: [x ]Unable to self-report see PAINAD, RDOS below  Source if other than patient:  [ ]Family   [ ]Team     Pain: [ ] yes [ ] no  QOL impact -   Location -                    Aggravating factors -  Quality -  Radiation -  Timing-  Severity (0-10 scale):  Minimal acceptable level (0-10 scale):       PCSSQ [Palliative Care Spiritual Screening Question]   Severity (0-10):  Score of 4 or > indicate consideration of Chaplaincy referral.  Chaplaincy Referral: [ ] yes [ ] refused [ ] following [ ] deferred  last seen 11/20/2024    Caregiver [place]? : [ ] yes [x ] no [ ] deferred:  Social work referral [ ] Patient & Family Centered Care Referral [ ]   Anticipatory Grief present?:  [x ] yes [ ] no [ ] deferred:  Social work referral [ ] Patient & Family Centered Care Referral [ ]  	  Other Symptoms:  [ ]All other review of systems negative The patient is unable to self-report.     PHYSICAL EXAM:   Vital Signs Last 24 Hrs  T(C): 36.7 (29 Nov 2024 08:24), Max: 36.7 (29 Nov 2024 08:24)  T(F): 98 (29 Nov 2024 08:24), Max: 98 (29 Nov 2024 08:24)  HR: 99 (29 Nov 2024 08:24) (99 - 99)  BP: 100/64 (29 Nov 2024 08:24) (100/64 - 100/64)  BP(mean): --  RR: 18 (29 Nov 2024 08:24) (18 - 18)  SpO2: 92% (29 Nov 2024 08:24) (92% - 92%)    Parameters below as of 29 Nov 2024 08:24  Patient On (Oxygen Delivery Method): nasal cannula     I&O's Summary    GENERAL: [ ] Cachexia  [x ]Alert  [ ]Oriented x   [ ]Lethargic  [ ]Unarousable  [ ]Verbal  [x ]Non-Verbal  Behavioral:   [ ] Anxiety  [ ] Delirium [x ] Agitation [ ] Other  HEENT:  [x ]Normal   [ ]Dry mouth   [ ]ET Tube/Trach  [ ]Oral lesions  PULMONARY:   [x ]Clear [ ]Tachypnea  [ ]Audible excessive secretions   [ ]Rhonchi        [ ]Right [ ]Left [ ]Bilateral  [ ]Crackles        [ ]Right [ ]Left [ ]Bilateral  [ ]Wheezing     [ ]Right [ ]Left [ ]Bilateral  [ ]Diminished BS [ ]Right [ ]Left [ ]Bilateral    CARDIOVASCULAR:    [x ]Regular [ ]Irregular [ ]Tachy  [ ]Emery [ ]Murmur [ ]Other  GASTROINTESTINAL:  [x ]Soft  [x ]Distended   [x ]+BS  [x ]Non tender [ ]Tender  [ ]Other [ ]PEG [ ]OGT/ NGT   Last BM:  11/28/2024  GENITOURINARY:  [ ]Normal [x ] Incontinent   [ ]Oliguria/Anuria   [ ]Vilchis  swollen labia  MUSCULOSKELETAL:   [ ]Normal   [x ]Weakness  [x ]Bed/Wheelchair bound [ ]Edema  NEUROLOGIC:   [x ]No focal deficits  [ ] Cognitive impairment  [ ] Dysphagia [ ]Dysarthria [ ] Paresis [ ]Other   SKIN:   [ ]Normal  [ ]Rash  [ ]Other  [ ]Pressure ulcer(s)  [ ]y [ ]n  Present on admission  sacral and heel DTI    CRITICAL CARE:  [ ] Shock Present  [ ]Septic [ ]Cardiogenic [ ]Neurologic [ ]Hypovolemic  [ ]  Vasopressors [ ]  Inotropes   [ ] Respiratory failure present [ ] Mechanical Ventilation [ ] Non-invasive ventilatory support [ ] High-Flow    [ ] Acute  [ ] Chronic [ ] Hypoxic  [ ] Hypercarbic [ ] Other  [ ] Other organ failure     LABS:  None new.           RADIOLOGY & ADDITIONAL STUDIES:   None new.     PROTEIN CALORIE MALNUTRITION: [ ] mild [ ] moderate [ ] severe  [ ] underweight [ ] morbid obesity    https://www.andeal.org/vault/2440/web/files/ONC/Table_Clinical%20Characteristics%20to%20Document%20Malnutrition-White%20JV%20et%20al%202012.pdf        [ ] PPSV2 < or = 30% [ ] significant weight loss [ ] poor nutritional intake [ ] anasarca   Artificial Nutrition [ ]     Other REFERRALS:    [ ] Hospice  [ ]Child Life  [ ]Social Work  [ ]Case management [ ]Holistic Therapy [ ] Physical Therapy [ ] Dietary         Palliative Performance Scale:  http://npcrc.org/files/news/palliative_performance_scale_ppsv2.pdf  (Ctrl +  left click to view)  Respiratory Distress Observation Tool:  https://homecareinformation.net/handouts/hen/Respiratory_Distress_Observation_Scale.pdf (Ctrl +  left click to view)  PAINAD Score:  http://[email or web address].[facility]/cog/painad.pdf (Ctrl +  left click to view)

## 2024-11-29 NOTE — PROGRESS NOTE ADULT - PROBLEM SELECTOR PLAN 2
- Continue with Morphine 2mg IV q6hr ATC   - Continue with Morphine 2mg IV q1hr PRN for moderate pain. ( None required in a 24hr period 7am-7am)   - Continue with Morphine 4mg IV q1hr PRN for severe pain. ( None required in a 24hr period 7am-7am)   - Bowel regimen while on opioids    Moaning on exam this am, appears to be very uncomfortable.  Medicate for pain and monitor.

## 2024-11-30 NOTE — PROGRESS NOTE ADULT - PROBLEM SELECTOR PLAN 6
The patient requires nursing assistance with ADLs,  - Supportive care.  - Turn and position.  - Continue with good skin care.  - PPS 10

## 2024-11-30 NOTE — PROGRESS NOTE ADULT - PROBLEM SELECTOR PLAN 1
- Continue with Morphine 2mg IV q4h ATC  - Continue with morphine 4mg q1h PRN for dyspnea  - bowel regimen while on opioids  - Last BM: 11/28

## 2024-11-30 NOTE — PROGRESS NOTE ADULT - PROBLEM SELECTOR PLAN 2
- Continue with Morphine 2mg IV q4h ATC  - Continue with Morphine 2mg IV q1h PRN moderate pain  - Continue with Moprhine 4mg IV q1h PRN severe pain  - bowel regimen while on opioids

## 2024-12-01 NOTE — PROGRESS NOTE ADULT - PROBLEM SELECTOR PLAN 2
None - Continue with Morphine 2mg IV q4h ATC  - Continue with Morphine 2mg IV q1h PRN moderate pain  - Continue with Moprhine 4mg IV q1h PRN severe pain  - bowel regimen while on opioids - Continue with Morphine 4mg IV q4h ATC  - Continue with Morphine 3mg IV q1h PRN moderate pain  - Continue with Morphine 6mg IV q1h PRN severe pain  - bowel regimen while on opioids - Continue with Morphine 4mg IV q4h ATC  - Continue with morphine 6mg q1h PRN for dyspnea  - bowel regimen while on opioids  - Last BM: 11/29

## 2024-12-01 NOTE — PROGRESS NOTE ADULT - NSPROGADDITIONALINFOA_GEN_ALL_CORE
Babak Branch MD  Hospice and Palliative Care Fellow  Geriatrics and Palliative Care Team  This note and recommendations are not finalized until signed by attending physician.
Bharti Finn   Hospitalist   available on TEAMS
above plans discussed with medicine ACP Mely
above plans discussed with medicine ACP Mely
Note incomplete until attested by attending.
Babak Branch MD  Hospice and Palliative Care Fellow  Geriatrics and Palliative Care Team  This note and recommendations are not finalized until signed by attending physician.
Note incomplete until attested by attending.

## 2024-12-01 NOTE — PROGRESS NOTE ADULT - SUBJECTIVE AND OBJECTIVE BOX
GAP TEAM PALLIATIVE CARE UNIT PROGRESS NOTE:      [  ] Patient on hospice program.    INDICATION FOR PALLIATIVE CARE UNIT SERVICES/Interval HPI:  Symptom management    OVERNIGHT EVENTS:  Patient seen and examined at bedside. appeared uncomfortable, reports pain in upper abdomen, no SOB. Daughter-in-law at bedside.  Chart review, in 24hrs 7AM-7AM, req no PRNs.    DNR on chart:  DNI  DNI        Allergies    No Known Allergies    Intolerances    MEDICATIONS  (STANDING):  Biotene Dry Mouth Oral Rinse 5 milliLiter(s) Swish and Spit four times a day  chlorhexidine 2% Cloths 1 Application(s) Topical daily  morphine  - Injectable 2 milliGRAM(s) IV Push every 4 hours  pantoprazole  Injectable 40 milliGRAM(s) IV Push daily    MEDICATIONS  (PRN):  acetaminophen   IVPB .. 1000 milliGRAM(s) IV Intermittent once PRN Mild Pain (1 - 3)  acetaminophen  Suppository .. 650 milliGRAM(s) Rectal every 8 hours PRN Temp greater or equal to 38C (100.4F), Mild Pain (1 - 3)  benzocaine 20% Spray 1 Spray(s) Topical three times a day PRN sore throat  bisacodyl Suppository 10 milliGRAM(s) Rectal daily PRN Constipation  glycopyrrolate Injectable 0.4 milliGRAM(s) IV Push every 6 hours PRN secretions  LORazepam   Injectable 0.25 milliGRAM(s) IV Push every 1 hour PRN Agitation/anxiety  morphine  - Injectable 4 milliGRAM(s) IV Push every 1 hour PRN Severe Pain (7 - 10)  morphine  - Injectable 2 milliGRAM(s) IV Push every 1 hour PRN Moderate Pain (4 - 6)  morphine  - Injectable 4 milliGRAM(s) IV Push every 1 hour PRN dyspnea    ITEMS UNCHECKED ARE NOT PRESENT    PRESENT SYMPTOMS: [ ]Unable to self-report see PAINAD, RDOS below  Source if other than patient:  [ ]Family   [ ]Team     Pain: [x ] yes [ ] no  QOL impact - discomfort  Location - upper abd  Aggravating factors - palpation  Quality - sharp  Radiation - non-radiating  Timing- intermittent  Severity (0-10 scale): 7  Minimal acceptable level (0-10 scale):       PCSSQ [Palliative Care Spiritual Screening Question]   Severity (0-10):  Score of 4 or > indicate consideration of Chaplaincy referral.  Chaplaincy Referral: [ ] yes [ ] refused [ ] following [ ] deferred    Caregiver [place]? : [ ] yes [ ] no [ x] deferred:  Social work referral [ ] Patient & Family Centered Care Referral [ ]   Anticipatory Grief present?:  [ ] yes [ ] no [ x] deferred:  Social work referral [ ] Patient & Family Centered Care Referral [ ]  	  Other Symptoms:  [ x]All other review of systems negative     PHYSICAL EXAM:   Vital Signs Last 24 Hrs  T(C): 36.9 (01 Dec 2024 07:49), Max: 36.9 (01 Dec 2024 07:49)  T(F): 98.5 (01 Dec 2024 07:49), Max: 98.5 (01 Dec 2024 07:49)  HR: 106 (01 Dec 2024 07:49) (106 - 106)  BP: 121/71 (01 Dec 2024 07:49) (121/71 - 121/71)  BP(mean): --  RR: 18 (01 Dec 2024 07:49) (18 - 18)  SpO2: 80% (01 Dec 2024 07:49) (80% - 80%)    Parameters below as of 01 Dec 2024 07:49  Patient On (Oxygen Delivery Method): nasal cannula     I&O's Summary    GENERAL: [x ] Cachexia  [x ]Alert  [ ]Oriented x   [ ]Lethargic  [ ]Unarousable  [x ]Verbal  [ ]Non-Verbal  Behavioral:   [ ] Anxiety  [ ] Delirium [ ] Agitation [ ] Other  HEENT:  [x ]Normal   [ ]Dry mouth   [ ]ET Tube/Trach  [ ]Oral lesions  PULMONARY:   [x ]Clear [ ]Tachypnea  [ ]Audible excessive secretions   [ ]Rhonchi        [ ]Right [ ]Left [ ]Bilateral  [ ]Crackles        [ ]Right [ ]Left [ ]Bilateral  [ ]Wheezing     [ ]Right [ ]Left [ ]Bilateral  [ ]Diminished BS [ ]Right [ ]Left [ ]Bilateral    CARDIOVASCULAR:    [x ]Regular [ ]Irregular [ ]Tachy  [ ]Emery [ ]Murmur [ ]Other  GASTROINTESTINAL:  [x ]Soft  [ ]Distended   [ x]+BS  [ ]Non tender [x ]Tender  [ ]Other [ ]PEG [ ]OGT/ NGT   Last BM:    GENITOURINARY:  [ ]Normal [x ] Incontinent   [ ]Oliguria/Anuria   [ ][place]  MUSCULOSKELETAL:   [ ]Normal   [ ]Weakness  [x ]Bed/Wheelchair bound [ ]Edema  NEUROLOGIC:   [ ]No focal deficits  [ x] Cognitive impairment  [ ] Dysphagia [ ]Dysarthria [ ] Paresis [ ]Other   SKIN:   [ ]Normal  [ ]Rash  [x ]Other sacral and heel DTI  [ ]Pressure ulcer(s)  [ ]y [ ]n  Present on admission  sacral and heel DTI    CRITICAL CARE:  [ ] Shock Present  [ ]Septic [ ]Cardiogenic [ ]Neurologic [ ]Hypovolemic  [ ]  Vasopressors [ ]  Inotropes   [ ] Respiratory failure present [ ] Mechanical Ventilation [ ] Non-invasive ventilatory support [ ] High-Flow    [ ] Acute  [ ] Chronic [ ] Hypoxic  [ ] Hypercarbic [ ] Other  [ ] Other organ failure     LABS:            RADIOLOGY & ADDITIONAL STUDIES:    PROTEIN CALORIE MALNUTRITION: [ ] mild [ ] moderate [ ] severe  [ ] underweight [ ] morbid obesity    https://www.andeal.org/vault/2440/web/files/ONC/Table_Clinical%20Characteristics%20to%20Document%20Malnutrition-White%20JV%20et%20al%796534.pdf        [ ] PPSV2 < or = 30% [ ] significant weight loss [ ] poor nutritional intake [ ] anasarca   Artificial Nutrition [ ]     Other REFERRALS:    [ ] Hospice  [ ]Child Life  [ ]Social Work  [ ]Case management [ ]Holistic Therapy [ ] Physical Therapy [ ] Dietary         Palliative Performance Scale:  http://npcrc.org/files/news/palliative_performance_scale_ppsv2.pdf  (Ctrl +  left click to view)  Respiratory Distress Observation Tool:  https://homecareinformation.net/handouts/hen/Respiratory_Distress_Observation_Scale.pdf (Ctrl +  left click to view)  PAINAD Score:  http://geriatrictoolkit.[place].[facility]/cog/painad.pdf (Ctrl +  left click to view)

## 2024-12-01 NOTE — PROGRESS NOTE ADULT - PROBLEM SELECTOR PLAN 1
- Continue with Morphine 2mg IV q4h ATC  - Continue with morphine 4mg q1h PRN for dyspnea  - bowel regimen while on opioids  - Last BM: 11/29 - Continue with Morphine 4mg IV q4h ATC  - Continue with morphine 6mg q1h PRN for dyspnea  - bowel regimen while on opioids  - Last BM: 11/29 - Continue with Morphine 4mg IV q4h ATC  - Continue with Morphine 3mg IV q1h PRN moderate pain  - Continue with Morphine 6mg IV q1h PRN severe pain  - bowel regimen while on opioids

## 2024-12-01 NOTE — PROGRESS NOTE ADULT - ATTENDING COMMENTS
PE provoked in the setting of metastatic pancreatic cancer, s/p whipple two years ago now on chemo; frail; anemia; parenchymal lung disease.   -please get therapeutic on a/c; monitor for bleeding  -not a candidate for advanced therapies given underlying co-morbidities, anemia and frailty   -continue GOC discussion
75 yo woman with pancreatic met CA, GIB, who presented with PE at OSH    lethargic   hypotensive earlier today   will d/w vascular cards further plans   Acute hypoxic respiratory failure requiring HFNC, but now on BIPAP due to worsening resp status and desaturation   npo given resp status   Normal renal function, likely her anasarca is multifactorial and mostly related to hypoalbuminemia, diuresis on hold    H/H low but acceptable s/p blood tx   on Heparin drip for PE  Afebrile, but with high WBC possibly related to PE but cannot rule out possible PNA as well, thus she is on Zosyn, f/u cx   Sugars controlled  No central access    given her clinical deterioration discussion with family led to DNR/DNI status and comfort measures with no withdrawal on current care, no central lines, no blood draws.  will attempt to set up inpatient hospice
75 yo woman with pancreatic met CA, GIB, who presented with PE at OSH    lethargic   hypotensive   not a candidate for thrombectomy    Acute hypoxic respiratory failure requiring HFNC, but now on BIPAP due to worsening resp status and desaturation   npo given resp status   s/p blood tx   on Heparin drip for PE  No central access    given her clinical deterioration discussion with family led to DNR/DNI status and comfort measures with no withdrawal on current care, no central lines, no blood draws.  will attempt to set up inpatient hospice, but her family refused at this time as they will cont heparin drip and BIPAP and don't want symptom control, yet pt has been asking for pain meds.  Will need to clarify with her family GOC further as they did not want any escalation of care, blood draws, lines, etc.
75 yo woman with pancreatic met CA, GIB, who presented with PE at OSH    A+Ox3  Hemodynamics acceptable, no pressors or inotropes  lactate normalized  will d/w vascular cards further plans   Acute hypoxic respiratory failure requiring HFNC  DASH diet  Normal renal function, likely her anasarca is multifactorial and mostly related to hypoalbuminemia, s/p diuresis overnight     H/H low but acceptable s/p blood tx   on Heparin drip for PE  Afebrile, but with high WBC possibly related to PE but cannot rule out possible PNA as well, thus she is on Zosyn, f/u cx   Sugars controlled  No central access    d/w son extensively at the bedside
74 year old woman w/ PMHx of pancreatic cancer on chemotherapy, DVT on Eliquis 2.5mg BID, HTN, recent admission for symptomatic anemia requiring blood transfusion. Now presenting with SOB as well as melena s/p transfusion 11/11, found to have saddle PE and started on HFNC.   Patient transferred to PCU for management of sx and disposition planning.  Patient presently asymptomatic for pain/dyspnea/agitation.    No PRNs required for sx.  Continue Lovenox for saddle pulmonary embolism. Will continue to wean HFNC at tolerated. HFNC requirement current barrier to discharge planning. If pt able to be weaned to NC will discuss possible home with hospice with family. Family updated by team. Case discussed during IDT rounds.
74 year old woman w/ PMHx of pancreatic cancer on chemotherapy, DVT on Eliquis 2.5mg BID, HTN, recent admission for symptomatic anemia requiring blood transfusion. Now presenting with SOB as well as melena s/p transfusion 11/11, found to have saddle PE and started on HFNC. GaP team consulted for complex medical decision making in the setting of serious illness and symptoms management. In PCU for comfort care and symptom management     1) Acute respiratory distress   Controlled   - Continue with Morphine 2mg IV q6hr ATC.  - Continue with Morphine 4mg IV q1hr PRN.     2) Pain management.  Controlled   - Continue with Morphine 2mg IV q6hr ATC   - Continue with Morphine 2mg-4mg IV q1hr PRN for moderate to severe pain.     3) Encounter for palliative care.   Marielena VargasatorGrant, ID # [id number]. I met with the patient's  and updated him regarding the patient's condition. He asked for the patient to be turned and reposition so her lower back was offloaded to decrease pain over that area. I indicated that per routine nursing was doing that. Also, if pain was to be recurrent that Morphine PRN was available.   Rest of the assessment and plan as per Dr. Murillo's  note   Will continue PCU care for advanced symptoms monitoring and treatment.         Sarbjit Sanchez MD  Associate Chief Geriatrics and Palliative Care (GaP) [facility]   [place] Consult Service   , [employer] at [facility]      Please contact me via Teams from Monday through Friday between 9am-5pm. If not answering, please call the palliative care pager [phone]    After 5pm and on weekends, please see the contact information below:    In the event of newly developing, evolving, or worsening symptoms, please contact the Palliative Medicine team via pager (if the patient is at [facility] #[phone] or if the patient is at [facility] #[phone]) The Geriatric and Palliative Medicine service has coverage 24 hours a day/ 7 days a week to provide medical recommendations regarding symptom management needs via telephone
74 year old woman w/ PMHx of pancreatic cancer on chemotherapy, DVT on Eliquis 2.5mg BID, HTN, recent admission for symptomatic anemia requiring blood transfusion. Now presenting with SOB as well as melena s/p transfusion 11/11, found to have saddle PE and started on HFNC.   Patient transferred to PCU for management of sx and disposition planning.  Patient presently asymptomatic for pain/dyspnea/agitation.    No PRNs required for sx.  Continue Lovenox for saddle pulmonary embolism. Pt weaned from HFCN to 6L NC. If respiratory status remains stable on NC will discuss transition to home with hospice with family tomorrow. Family updated by team. Case discussed during IDT rounds.
74 year old woman w/ PMHx of pancreatic cancer on chemotherapy, DVT on Eliquis 2.5mg BID, HTN, recent admission for symptomatic anemia requiring blood transfusion. Now presenting with SOB as well as melena s/p transfusion 11/11, found to have saddle PE and started on HFNC.   Patient transferred to PCU for management of sx and disposition planning.  Patient presently asymptomatic for pain/dyspnea/agitation.    No PRNs required for sx.  Continue Lovenox for saddle pulmonary embolism. Vaginal bleeding subsided per nursing. Family at bedside, updated as to current clinical condition and plan of care. HFNC requirement current barrier to discharge planning. If pt able to be weaned to NC will discuss possible home with hospice with family. Case discussed during IDT rounds.
74 year old woman w/ PMHx of pancreatic cancer on chemotherapy, DVT on Eliquis 2.5mg BID, HTN, recent admission for symptomatic anemia requiring blood transfusion. Now presenting with SOB as well as melena s/p transfusion 11/11, found to have saddle PE and started on HFNC. GaP team consulted for complex medical decision making in the setting of serious illness and symptoms management. In PCU for comfort care and symptom management     1) Acute respiratory distress   Controlled   - Continue with Morphine 2mg IV q6hr ATC.  - Continue with Morphine 4mg IV q1hr PRN.     2) Pain management.  Controlled   - Continue with Morphine 2mg IV q6hr ATC   - Continue with Morphine 2mg-4mg IV q1hr PRN for moderate to severe pain.     3) Encounter for palliative care.   Marielena , Grant, ID # [id number], Za. I met with the patient's daughter in law and updated her regarding the patient's condition. She was wondering if Morphine titration was causing the patient not to be able to move and if it was appropriate to try to decrease Morphine. I indicate that Morphine was keeping the patient without pain or respiratory distress and that trying to titrate it down was likely going to cause discomfort. I also indicated that the patient's functional disability was 2/2 to her advanced illness process rather than Morphine. She also was wondering if she should continue to give the patient food. She was concerned the patient was chocking when she was given her food. I indicated that, at this point, it was unsafe to provide food and that the patient's body was naturally changing through the EOL process and that part of it was to stop eating. Her daughter in law appreciated the inputs given  Rest of the assessment and plan as per Dr. Branch's  note above.   Will continue PCU care for advanced symptoms monitoring and treatment.     Sarbjit Sanchez MD  Associate Chief Geriatrics and Palliative Care (GaP) [facility]   [facility] Consult Service   , [employer] at [facility]      Please contact me via Teams from Monday through Friday between 9am-5pm. If not answering, please call the palliative care pager [phone]    After 5pm and on weekends, please see the contact information below:    In the event of newly developing, evolving, or worsening symptoms, please contact the Palliative Medicine team via pager (if the patient is at [facility] #[phone] or if the patient is at [facility] #[phone]) The Geriatric and Palliative Medicine service has coverage 24 hours a day/ 7 days a week to provide medical recommendations regarding symptom management needs via telephone
74 year old woman w/ PMHx of pancreatic cancer on chemotherapy, DVT on Eliquis 2.5mg BID, HTN, recent admission for symptomatic anemia requiring blood transfusion. Now presenting with SOB as well as melena s/p transfusion 11/11, found to have saddle PE and started on HFNC. GaP team consulted for complex medical decision making in the setting of serious illness and symptoms management. In PCU for comfort care and symptom management     1) Pain management.  Uncontrolled. She was c/o 7/10 pain over LUQ.   - Will increase Morphine ATC to 4mg IV q6hr.  - Will increase Morphine PRN to 4-6mg IV q1hr PRN for moderate to severe pain.     2) Acute respiratory distress   Controlled   - Morphine ATC as above.   - Will increase Morphine PRN to 6mg IV q1hr PRN.     3) Encounter for palliative care.   Arya Dalal, ID # [id number]. I met with the patient's daughter and with the patient. The patient's daughter in law was concerned about the patient's pain and she was wondering if it was 2/2 to Morphine. I indicated that the patient's pain was 2/2 to CA and that Morphine was actually helping with the patient's pain. I indicated that pain meds were going to be up titrated, so symptoms were better controlled.   Rest of the assessment and plan as per Dr. Branch's  note above.   Will continue PCU care for advanced symptoms monitoring and treatment.     Sarbjit Sanchez MD  Associate Chief Geriatrics and Palliative Care (GaP) [facility]   [place] Consult Service   , [employer] at [facility]      Please contact me via Teams from Monday through Friday between 9am-5pm. If not answering, please call the palliative care pager [phone]    After 5pm and on weekends, please see the contact information below:    In the event of newly developing, evolving, or worsening symptoms, please contact the Palliative Medicine team via pager (if the patient is at [facility] #[phone] or if the patient is at [facility] #[phone]) The Geriatric and Palliative Medicine service has coverage 24 hours a day/ 7 days a week to provide medical recommendations regarding symptom management needs via telephone
74 year old woman w/ PMHx of pancreatic cancer on chemotherapy, DVT on Eliquis 2.5mg BID, HTN, recent admission for symptomatic anemia requiring blood transfusion. Now presenting with SOB as well as melena s/p transfusion 11/11, found to have saddle PE and started on HFNC.   Patient transferred to PCU for management of sx and disposition planning, following with primary team.  Patient presently asymptomatic for pain/dyspnea/agitation.    I have reviewed all documentation from prior primary team and consultants, as well as relevant imaging and laboratory data as this patient is new to me.    In the setting of parenteral controlled substance administration, clinical monitoring required for side effects such as respiratory depression, constipation and opioid induced neurotoxicity due to organ failure.    Family at bedside, updated as to current clinical condition and plan of care.  Educated as to what to expect, questions answered and emotional support provided.    Patient assessment and plan discussed on interdisciplinary team rounds today.

## 2024-12-02 NOTE — PROGRESS NOTE ADULT - NS ATTEND AMEND GEN_ALL_CORE FT
74 year old woman w/ PMHx of pancreatic cancer on chemotherapy, DVT on Eliquis 2.5mg BID, HTN, recent admission for symptomatic anemia requiring blood transfusion. Now presenting with SOB as well as melena s/p transfusion 11/11, found to have saddle PE and started on HFNC. [place] team consulted for complex medical decision making in the setting of serious illness and symptoms management. In PCU for comfort care and symptom management     1) Pain management.  Mostly controlled.   - Continue Morphine ATC 4mg IV q6hr.  - Continue Morphine PRN 4-6mg IV q1hr PRN for moderate to severe pain.     2) Acute respiratory distress   Controlled   - Will continue Morphine ATC as above.   - Will continue Morphine 6mg IV q1hr PRN.     3) Encounter for palliative care.   Rest of the assessment and plan as per TIFFANY Epstein's note   Will continue PCU care for advanced symptoms monitoring and treatment.      Sarbjit Sanchez MD  Associate Chief Geriatrics and Palliative Care (GaP) [facility]   [place] Consult Service   , Medicine, [employer] at [facility]      Please contact me via Teams from Monday through Friday between 9am-5pm. If not answering, please call the palliative care pager [phone]    After 5pm and on weekends, please see the contact information below:    In the event of newly developing, evolving, or worsening symptoms, please contact the Palliative Medicine team via pager (if the patient is at [facility] #[phone] or if the patient is at [facility] #[phone]) The Geriatric and Palliative Medicine service has coverage 24 hours a day/ 7 days a week to provide medical recommendations regarding symptom management needs via telephone

## 2024-12-02 NOTE — PROGRESS NOTE ADULT - SUBJECTIVE AND OBJECTIVE BOX
GAP TEAM PALLIATIVE CARE UNIT PROGRESS NOTE:      [  ] Patient on hospice program.    INDICATION FOR PALLIATIVE CARE UNIT SERVICES/Interval HPI: Symptom management in the setting of a 73y/o F with pancreatic cancer.  P/w SOB, melena found to have saddle PE resulting in AHRF requiring NIPVV. Now off NIPVV    OVERNIGHT EVENTS: Chart reviewed. The patient is seen and examined at the bedside. Family member at the [employer]  used. Name: Adore ID# [id number]. Patient A&O X1 this morning. At first patient reporting pain and pointing to her abdomen. Offered pain medication, patient refused and said she is not in pain. Patient seen moaning and appears uncomfortable. Educated family member that we will continue with scheduled morphine and if the patient appears uncomfortable, we will administer the PRN morphine. She required PRN Morphine 6mg IV X1 for severe pain within a 24hr period 7am-7am.    DNR on chart: DNI  DNI      Allergies    No Known Allergies    Intolerances    MEDICATIONS  (STANDING):  Biotene Dry Mouth Oral Rinse 5 milliLiter(s) Swish and Spit four times a day  chlorhexidine 2% Cloths 1 Application(s) Topical daily  morphine  - Injectable 4 milliGRAM(s) IV Push every 4 hours  pantoprazole  Injectable 40 milliGRAM(s) IV Push daily    MEDICATIONS  (PRN):  acetaminophen   IVPB .. 1000 milliGRAM(s) IV Intermittent once PRN Mild Pain (1 - 3)  acetaminophen  Suppository .. 650 milliGRAM(s) Rectal every 8 hours PRN Temp greater or equal to 38C (100.4F), Mild Pain (1 - 3)  benzocaine 20% Spray 1 Spray(s) Topical three times a day PRN sore throat  bisacodyl Suppository 10 milliGRAM(s) Rectal daily PRN Constipation  glycopyrrolate Injectable 0.4 milliGRAM(s) IV Push every 6 hours PRN secretions  LORazepam   Injectable 0.25 milliGRAM(s) IV Push every 1 hour PRN Agitation/anxiety  morphine  - Injectable 8 milliGRAM(s) IV Push every 1 hour PRN dyspnea  morphine  - Injectable 8 milliGRAM(s) IV Push every 1 hour PRN Severe Pain (7 - 10)  morphine  - Injectable 4 milliGRAM(s) IV Push every 1 hour PRN Moderate Pain (4 - 6)    ITEMS UNCHECKED ARE NOT PRESENT    PRESENT SYMPTOMS: [ X]Unable to self-report see PAINAD, RDOS below  Source if other than patient:  [ ]Family   [ X]Team     Pain: [ ] yes [ ] no  QOL impact -   Location -                    Aggravating factors -  Quality -  Radiation -  Timing-  Severity (0-10 scale):  Minimal acceptable level (0-10 scale):     Dyspnea:                           [ ]Mild [ ]Moderate [ ]Severe  Anxiety:                             [ ]Mild [ ]Moderate [ ]Severe  Fatigue:                             [ ]Mild [ ]Moderate [ ]Severe  Nausea:                             [ ]Mild [ ]Moderate [ ]Severe  Loss of appetite:              [ ]Mild [ ]Moderate [ ]Severe  Constipation:                    [ ]Mild [ ]Moderate [ ]Severe    PCSSQ [Palliative Care Spiritual Screening Question]   Severity (0-10):  Score of 4 or > indicate consideration of Chaplaincy referral.  Chaplaincy Referral: [ ] yes [ ] refused [X ] following [ ] deferred Last seen 11/20    Caregiver [place]? : [ X] yes [ ] no [ ] deferred:  Social work referral [X ] Patient & Family Centered Care Referral [ ]     Anticipatory Grief present?:  [X ] yes [ ] no [ ] deferred:  Social work referral [X ] Patient & Family Centered Care Referral [ ]  	  Other Symptoms:  [ ]All other review of systems negative- Unable to self report.     PHYSICAL EXAM:   Vital Signs Last 24 Hrs  T(C): 36.7 (02 Dec 2024 07:28), Max: 36.7 (02 Dec 2024 07:28)  T(F): 98.1 (02 Dec 2024 07:28), Max: 98.1 (02 Dec 2024 07:28)  HR: 97 (02 Dec 2024 07:28) (97 - 97)  BP: 109/67 (02 Dec 2024 07:28) (109/67 - 109/67)  BP(mean): --  RR: 18 (02 Dec 2024 07:28) (18 - 18)  SpO2: 90% (02 Dec 2024 07:28) (90% - 90%)    Parameters below as of 02 Dec 2024 07:28  Patient On (Oxygen Delivery Method): nasal cannula  O2 Flow (L/min): 4   I&O's Summary      GENERAL: [ ] Cachexia  [ ]Alert  [ ]Oriented x   [ X]Lethargic  [ ]Unarousable  [X ]Verbal- minimal   [ ]Non-Verbal  Behavioral:   [ ] Anxiety  [ ] Delirium [ ] Agitation [ ] Other  HEENT:  [ ]Normal   [X ]Dry mouth   [ ]ET Tube/Trach  [ ]Oral lesions  PULMONARY:   [X ]Clear [ ]Tachypnea  [ ]Audible excessive secretions   [ ]Rhonchi        [ ]Right [ ]Left [ ]Bilateral  [ ]Crackles        [ ]Right [ ]Left [ ]Bilateral  [ ]Wheezing     [ ]Right [ ]Left [ ]Bilateral  [ ]Diminished BS [ ]Right [ ]Left [ ]Bilateral    CARDIOVASCULAR:    [ X]Regular [ ]Irregular [ ]Tachy  [ ]Emery [ ]Murmur [ ]Other  GASTROINTESTINAL:  [ X]Soft  [ ]Distended   [ X]+BS  [ X]Non tender [ ]Tender  [ ]Other [ ]PEG [ ]OGT/ NGT   Last BM:  11/29  GENITOURINARY:  [ ]Normal [X ] Incontinent   [ ]Oliguria/Anuria   [X ]Vilchis  MUSCULOSKELETAL:   [ ]Normal   [ X]Weakness  [XBed/Wheelchair bound [ ]Edema  NEUROLOGIC:   [ ]No focal deficits  [X ] Cognitive impairment  [ ] Dysphagia [ ]Dysarthria [ ] Paresis [ ]Other   SKIN:   [ ]Normal  [ ]Rash  [ ]Other  [X ]Pressure ulcer(s)  [ ]y [ ]n  Present on admission  sacral, b/l heel DTI. Please see nursing assessment for further details for which I have reviewed.      CRITICAL CARE:  [ ] Shock Present  [ ]Septic [ ]Cardiogenic [ ]Neurologic [ ]Hypovolemic  [ ]  Vasopressors [ ]  Inotropes   [ ] Respiratory failure present [ ] Mechanical Ventilation [ ] Non-invasive ventilatory support [ ] High-Flow  [ ] Acute  [ ] Chronic [ ] Hypoxic  [ ] Hypercarbic [ ] Other  [X ] Other organ failure- Skin    LABS: None new.     RADIOLOGY & ADDITIONAL STUDIES: None new. .     PROTEIN CALORIE MALNUTRITION: [ ] mild [ ] moderate [X ] severe- Please see the nutritionist note.  [ ] underweight [ ] morbid obesity    https://www.andeal.org/vault/2440/web/files/ONC/Table_Clinical%20Characteristics%20to%20Document%20Malnutrition-White%20JV%20et%20al%202012.pdf        Height (cm): 152.4 (11-11-24 @ 21:00), 149.9 (11-11-24 @ 13:29), 149.9 (11-01-24 @ 10:52)  Weight (kg): 51.9 (11-11-24 @ 21:00), 49 (11-11-24 @ 13:29), 47.6 (11-01-24 @ 10:52)  BMI (kg/m2): 22.3 (11-11-24 @ 21:00), 21.8 (11-11-24 @ 13:29), 21.2 (11-01-24 @ 10:52)    [X ] PPSV2 < or = 30% [ ] significant weight loss [ ] poor nutritional intake [ ] anasarca   Artificial Nutrition [ ]     Other REFERRALS:    [ ] Hospice  [ ]Child Life  [X ]Social Work  [ ]Case management [ ]Holistic Therapy [ ] Physical Therapy [ ] Dietary     Progress Notes - Care Coordination [C. Provider] (11-27-24 @ 16:21)      Palliative Performance Scale:  http://npcrc.org/files/news/palliative_performance_scale_ppsv2.pdf  (Ctrl +  left click to view)  Respiratory Distress Observation Tool:  https://homecareinformation.net/handouts/hen/Respiratory_Distress_Observation_Scale.pdf (Ctrl +  left click to view)  PAINAD Score:  http://geriatrictoolkit.[place].[facility]/cog/painad.pdf (Ctrl +  left click to view)

## 2024-12-02 NOTE — PROGRESS NOTE ADULT - PROBLEM SELECTOR PLAN 6
- PPSV 20% The patient requires nursing assistance with ADLs,  - Supportive care.  - Turn and position.  - Continue with good skin care.

## 2024-12-02 NOTE — PROGRESS NOTE ADULT - PROBLEM SELECTOR PLAN 1
- Continue with Morphine 4mg IV q4h ATC  - Continue with Morphine 3mg IV q1h PRN moderate pain. ( None required in a 24hr period 7am-7am)   - Continue with Morphine 6mg IV q1h PRN severe pain. ( 1 required in a 24hr period 7am-7am)   - Bowel regimen while on opioids

## 2024-12-02 NOTE — PROGRESS NOTE ADULT - PROBLEM SELECTOR PLAN 2
- Continue with Morphine 4mg IV q4h ATC  - Continue with morphine 6mg q1h PRN for dyspnea ( None required in a 24hr period 7am-7am)   - Bowel regimen while on opioids  - Last BM: 11/29

## 2024-12-03 NOTE — PROGRESS NOTE ADULT - NS ATTEND AMEND GEN_ALL_CORE FT
74F with of pancreatic cancer on chemotherapy, DVT on Eliquis 2.5mg BID, HTN, recent admission for symptomatic anemia requiring blood transfusion. Admitted with SOB as well as melena s/p transfusion 11/11, found to have saddle PE and started on HFNC.  Patient transferred to PCU for management of sx at end of life     Patient seen this morning, sleeping in bed, appears comfortable. Pt's  at bedside, conversation held in [place]. He understands the nature of her overall conditions and wishing for her to be comfortable. He notes that pt gets pain with IVP medications which ATC at q4 hours. We discussed changing her current regimen to morphine gtt (1 mg/hr) to reduce IVP administration needs, family in agreement. Support provided to  at bedside today.     Angie White MD  GAP Team Consults  Please call if we can be of assistance, [phone]

## 2024-12-03 NOTE — PROGRESS NOTE ADULT - PROBLEM SELECTOR PLAN 2
- d/c ATC Morphine and ordered morphine gtt @1mg/hr  - Continue with morphine 8mg q1h PRN for dyspnea ( None required in a 24hr period 7am-7am)   - Bowel regimen while on opioids  - Last BM: 11/29

## 2024-12-03 NOTE — PROGRESS NOTE ADULT - SUBJECTIVE AND OBJECTIVE BOX
GAP TEAM PALLIATIVE CARE UNIT PROGRESS NOTE:      [  ] Patient on hospice program.    INDICATION FOR PALLIATIVE CARE UNIT SERVICES/Interval HPI: Symptom management in the setting of a 73y/o F with pancreatic cancer.  P/w SOB, melena found to have saddle PE resulting in AHRF requiring NIPVV. Now off NIPVV    OVERNIGHT EVENTS: Chart reviewed. The patient is seen and examined at the bedside. Family member at the [employer]  used. Name: Austin and ID #[id number].  Patient shakes her head to yes/no questions. She required PRN Morphine 6mg IV X1 for severe pain within a 24hr period 7am-7am.    DNR on chart: DNI  DNI      Allergies    No Known Allergies    Intolerances    MEDICATIONS  (STANDING):  chlorhexidine 2% Cloths 1 Application(s) Topical daily  morphine  Infusion 1 mG/Hr (1 mL/Hr) IV Continuous <Continuous>  pantoprazole  Injectable 40 milliGRAM(s) IV Push daily  sodium chloride 0.9%. 1000 milliLiter(s) (10 mL/Hr) IV Continuous <Continuous>    MEDICATIONS  (PRN):  acetaminophen   IVPB .. 1000 milliGRAM(s) IV Intermittent once PRN Mild Pain (1 - 3)  acetaminophen  Suppository .. 650 milliGRAM(s) Rectal every 8 hours PRN Temp greater or equal to 38C (100.4F), Mild Pain (1 - 3)  benzocaine 20% Spray 1 Spray(s) Topical three times a day PRN sore throat  bisacodyl Suppository 10 milliGRAM(s) Rectal daily PRN Constipation  glycopyrrolate Injectable 0.4 milliGRAM(s) IV Push every 6 hours PRN secretions  LORazepam   Injectable 0.25 milliGRAM(s) IV Push every 1 hour PRN Agitation/anxiety  morphine  - Injectable 8 milliGRAM(s) IV Push every 1 hour PRN dyspnea  morphine  - Injectable 8 milliGRAM(s) IV Push every 1 hour PRN Severe Pain (7 - 10)  morphine  - Injectable 4 milliGRAM(s) IV Push every 1 hour PRN Moderate Pain (4 - 6)    ITEMS UNCHECKED ARE NOT PRESENT    PRESENT SYMPTOMS: [ X]Unable to self-report see PAINAD, RDOS below  Source if other than patient:  [ ]Family   [ X]Team     Pain: [ ] yes [ ] no  QOL impact -   Location -                    Aggravating factors -  Quality -  Radiation -  Timing-  Severity (0-10 scale):  Minimal acceptable level (0-10 scale):     Dyspnea:                           [ ]Mild [ ]Moderate [ ]Severe  Anxiety:                             [ ]Mild [ ]Moderate [ ]Severe  Fatigue:                             [ ]Mild [ ]Moderate [ ]Severe  Nausea:                             [ ]Mild [ ]Moderate [ ]Severe  Loss of appetite:              [ ]Mild [ ]Moderate [ ]Severe  Constipation:                    [ ]Mild [ ]Moderate [ ]Severe    PCSSQ [Palliative Care Spiritual Screening Question]   Severity (0-10):  Score of 4 or > indicate consideration of Chaplaincy referral.  Chaplaincy Referral: [ ] yes [ ] refused [X ] following [ ] deferred Last seen 11/20    Caregiver [place]? : [ X] yes [ ] no [ ] deferred:  Social work referral [X ] Patient & Family Centered Care Referral [ ]     Anticipatory Grief present?:  [X ] yes [ ] no [ ] deferred:  Social work referral [X ] Patient & Family Centered Care Referral [ ]  	  Other Symptoms:  [ ]All other review of systems negative- Unable to self report.     PHYSICAL EXAM:   Vital Signs Last 24 Hrs  T(C): 37 (03 Dec 2024 08:44), Max: 37 (03 Dec 2024 08:44)  T(F): 98.6 (03 Dec 2024 08:44), Max: 98.6 (03 Dec 2024 08:44)  HR: 107 (03 Dec 2024 08:44) (107 - 107)  BP: 111/60 (03 Dec 2024 08:44) (111/60 - 111/60)  BP(mean): --  RR: 16 (03 Dec 2024 08:44) (16 - 16)  SpO2: 94% (03 Dec 2024 08:44) (94% - 94%)    Parameters below as of 03 Dec 2024 08:44  Patient On (Oxygen Delivery Method): nasal cannula     I&O's Summary    GENERAL: [ ] Cachexia  [ ]Alert  [ ]Oriented x   [ X]Lethargic  [ ]Unarousable  [X ]Verbal- minimal   [ ]Non-Verbal  Behavioral:   [ ] Anxiety  [ ] Delirium [ ] Agitation [ ] Other  HEENT:  [ ]Normal   [X ]Dry mouth   [ ]ET Tube/Trach  [ ]Oral lesions  PULMONARY:   [ ]Clear [ ]Tachypnea  [ ]Audible excessive secretions   [X ]Rhonchi        [ ]Right [ ]Left [X ]Bilateral  [ ]Crackles        [ ]Right [ ]Left [ ]Bilateral  [ ]Wheezing     [ ]Right [ ]Left [ ]Bilateral  [ ]Diminished BS [ ]Right [ ]Left [ ]Bilateral    CARDIOVASCULAR:    [ X]Regular [ ]Irregular [ ]Tachy  [ ]Emery [ ]Murmur [ ]Other  GASTROINTESTINAL:  [ X]Soft  [ ]Distended   [ X]+BS  [ X]Non tender [ ]Tender  [ ]Other [ ]PEG [ ]OGT/ NGT   Last BM:  11/29  GENITOURINARY:  [ ]Normal [X ] Incontinent   [ ]Oliguria/Anuria   [X ]Vilchis  MUSCULOSKELETAL:   [ ]Normal   [ X]Weakness  [XBed/Wheelchair bound [ ]Edema  NEUROLOGIC:   [ ]No focal deficits  [X ] Cognitive impairment  [ ] Dysphagia [ ]Dysarthria [ ] Paresis [ ]Other   SKIN:   [ ]Normal  [ ]Rash  [ ]Other  [X ]Pressure ulcer(s)  [ ]y [ ]n  Present on admission  sacral, b/l heel DTI. Please see nursing assessment for further details for which I have reviewed.      CRITICAL CARE:  [ ] Shock Present  [ ]Septic [ ]Cardiogenic [ ]Neurologic [ ]Hypovolemic  [ ]  Vasopressors [ ]  Inotropes   [ ] Respiratory failure present [ ] Mechanical Ventilation [ ] Non-invasive ventilatory support [ ] High-Flow  [ ] Acute  [ ] Chronic [ ] Hypoxic  [ ] Hypercarbic [ ] Other  [X ] Other organ failure- Skin    LABS: None new.     RADIOLOGY & ADDITIONAL STUDIES: None new. .     PROTEIN CALORIE MALNUTRITION: [ ] mild [ ] moderate [X ] severe- Please see the nutritionist note.  [ ] underweight [ ] morbid obesity    https://www.andeal.org/vault/2440/web/files/ONC/Table_Clinical%20Characteristics%20to%20Document%20Malnutrition-White%20JV%20et%20al%202012.pdf  Height (cm): 152.4 (11-11-24 @ 21:00), 149.9 (11-11-24 @ 13:29), 149.9 (11-01-24 @ 10:52)  Weight (kg): 51.9 (11-11-24 @ 21:00), 49 (11-11-24 @ 13:29), 47.6 (11-01-24 @ 10:52)  BMI (kg/m2): 22.3 (11-11-24 @ 21:00), 21.8 (11-11-24 @ 13:29), 21.2 (11-01-24 @ 10:52)    [X ] PPSV2 < or = 30% [ ] significant weight loss [ ] poor nutritional intake [ ] anasarca   Artificial Nutrition [ ]     Other REFERRALS:    [ ] Hospice  [ ]Child Life  [X ]Social Work  [ ]Case management [ ]Holistic Therapy [ ] Physical Therapy [ ] Dietary     Progress Notes - Care Coordination [C. Provider] (12-02-24 @ 14:18)      Palliative Performance Scale:  http://npcrc.org/files/news/palliative_performance_scale_ppsv2.pdf  (Ctrl +  left click to view)  Respiratory Distress Observation Tool:  https://homecareinformation.net/handouts/hen/Respiratory_Distress_Observation_Scale.pdf (Ctrl +  left click to view)  PAINAD Score:  http://geriatrictoolkit.[place].[facility]/cog/painad.pdf (Ctrl +  left click to view)

## 2024-12-03 NOTE — PROGRESS NOTE ADULT - PROBLEM SELECTOR PLAN 1
- d/c ATC Morphine and ordered morphine gtt @1mg/hr  - Continue with Morphine 4mg IV q1h PRN moderate pain. ( None required in a 24hr period 7am-7am)   - Continue with Morphine 8mg IV q1h PRN severe pain. ( None  required in a 24hr period 7am-7am)   - Bowel regimen while on opioids - d/c ATC Morphine and order morphine gtt @1mg/hr  - Continue with Morphine 4mg IV q1h PRN moderate pain. ( None required in a 24hr period 7am-7am)   - Continue with Morphine 8mg IV q1h PRN severe pain. ( None  required in a 24hr period 7am-7am)   - Bowel regimen while on opioids

## 2024-12-04 NOTE — PROGRESS NOTE ADULT - PROBLEM SELECTOR PROBLEM 3
Acute hypoxic respiratory failure
Terminal restlessness
Terminal restlessness
Functional quadriplegia
Dyspnea
Dyspnea
Acute hypoxic respiratory failure
Dyspnea
Dyspnea
Acute hypoxic respiratory failure
Dyspnea
Pain management
Terminal restlessness
Functional quadriplegia
Dyspnea
Functional quadriplegia
Pain management
Terminal restlessness
Terminal restlessness

## 2024-12-04 NOTE — PROGRESS NOTE ADULT - PROBLEM SELECTOR PROBLEM 6
Pancreatic cancer
Functional quadriplegia
Encounter for palliative care
Functional quadriplegia
Encounter for palliative care
Palliative care encounter
Encounter for palliative care
Functional quadriplegia
Encounter for palliative care
Functional quadriplegia
Pancreatic cancer
Palliative care encounter
Functional quadriplegia
Encounter for palliative care

## 2024-12-04 NOTE — PROGRESS NOTE ADULT - SUBJECTIVE AND OBJECTIVE BOX
GAP TEAM PALLIATIVE CARE UNIT PROGRESS NOTE:      [  ] Patient on hospice program.    INDICATION FOR PALLIATIVE CARE UNIT SERVICES/Interval HPI: Symptom management in the setting of a 75y/o F with pancreatic cancer.  P/w SOB, melena found to have saddle PE resulting in AHRF requiring NIPVV. Now off NIPVV    OVERNIGHT EVENTS: Chart reviewed. The patient is seen and examined at the bedside. Daughter in law at the bedside. The patient required PRN Ativan 0.25mg IV X1 for agitation within a 24hr period 7am-7am.    DNR on chart: DNI  DNI      Allergies    No Known Allergies    Intolerances    MEDICATIONS  (STANDING):  chlorhexidine 2% Cloths 1 Application(s) Topical daily  morphine  Infusion 1 mG/Hr (1 mL/Hr) IV Continuous <Continuous>  pantoprazole  Injectable 40 milliGRAM(s) IV Push daily  sodium chloride 0.9%. 1000 milliLiter(s) (10 mL/Hr) IV Continuous <Continuous>    MEDICATIONS  (PRN):  acetaminophen   IVPB .. 1000 milliGRAM(s) IV Intermittent once PRN Mild Pain (1 - 3)  acetaminophen  Suppository .. 650 milliGRAM(s) Rectal every 8 hours PRN Temp greater or equal to 38C (100.4F), Mild Pain (1 - 3)  benzocaine 20% Spray 1 Spray(s) Topical three times a day PRN sore throat  bisacodyl Suppository 10 milliGRAM(s) Rectal daily PRN Constipation  glycopyrrolate Injectable 0.4 milliGRAM(s) IV Push every 6 hours PRN secretions  LORazepam   Injectable 0.25 milliGRAM(s) IV Push every 1 hour PRN Agitation/anxiety  morphine  - Injectable 8 milliGRAM(s) IV Push every 1 hour PRN dyspnea  morphine  - Injectable 8 milliGRAM(s) IV Push every 1 hour PRN Severe Pain (7 - 10)  morphine  - Injectable 4 milliGRAM(s) IV Push every 1 hour PRN Moderate Pain (4 - 6)    ITEMS UNCHECKED ARE NOT PRESENT    PRESENT SYMPTOMS: [X ]Unable to self-report see PAINAD, RDOS below  Source if other than patient:  [ ]Family   [X ]Team     Pain: [ ] yes [ ] no  QOL impact -   Location -                    Aggravating factors -  Quality -  Radiation -  Timing-  Severity (0-10 scale):  Minimal acceptable level (0-10 scale):     Dyspnea:                           [ ]Mild [ ]Moderate [ ]Severe  Anxiety:                             [ ]Mild [ ]Moderate [ ]Severe  Fatigue:                             [ ]Mild [ ]Moderate [ ]Severe  Nausea:                             [ ]Mild [ ]Moderate [ ]Severe  Loss of appetite:              [ ]Mild [ ]Moderate [ ]Severe  Constipation:                    [ ]Mild [ ]Moderate [ ]Severe    PCSSQ [Palliative Care Spiritual Screening Question]   Severity (0-10):  Score of 4 or > indicate consideration of Chaplaincy referral.  Chaplaincy Referral: [ ] yes [ ] refused [X ] following [ ] deferred Last seen 11/20    Caregiver [place]? : [ X] yes [ ] no [ ] deferred:  Social work referral [X ] Patient & Family Centered Care Referral [ ]     Anticipatory Grief present?:  [X ] yes [ ] no [ ] deferred:  Social work referral [X ] Patient & Family Centered Care Referral [ ]  	  Other Symptoms:  [ ]All other review of systems negative- Unable to self report.     PHYSICAL EXAM:   Vital Signs Last 24 Hrs  T(C): 37.1 (04 Dec 2024 09:13), Max: 37.1 (04 Dec 2024 09:13)  T(F): 98.8 (04 Dec 2024 09:13), Max: 98.8 (04 Dec 2024 09:13)  HR: 119 (04 Dec 2024 09:13) (119 - 119)  BP: 106/55 (04 Dec 2024 09:13) (106/55 - 106/55)  BP(mean): --  RR: 18 (04 Dec 2024 09:13) (18 - 18)  SpO2: 81% (04 Dec 2024 09:13) (81% - 81%)    Parameters below as of 04 Dec 2024 09:13  Patient On (Oxygen Delivery Method): nasal cannula     I&O's Summary    03 Dec 2024 07:01  -  04 Dec 2024 07:00  --------------------------------------------------------  IN: 0 mL / OUT: 200 mL / NET: -200 mL    GENERAL: [ ] Cachexia  [ ]Alert  [ ]Oriented x   [ X]Lethargic  [ ]Unarousable  [X ]Verbal- minimal   [ ]Non-Verbal  Behavioral:   [ ] Anxiety  [ ] Delirium [ ] Agitation [ ] Other  HEENT:  [ ]Normal   [X ]Dry mouth   [ ]ET Tube/Trach  [ ]Oral lesions  PULMONARY:   [ ]Clear [ ]Tachypnea  [ ]Audible excessive secretions   [X ]Rhonchi        [ ]Right [ ]Left [X ]Bilateral  [ ]Crackles        [ ]Right [ ]Left [ ]Bilateral  [ ]Wheezing     [ ]Right [ ]Left [ ]Bilateral  [ ]Diminished BS [ ]Right [ ]Left [ ]Bilateral    CARDIOVASCULAR:    [ X]Regular [ ]Irregular [X ]Tachy  [ ]Emery [ ]Murmur [ ]Other  GASTROINTESTINAL:  [ X]Soft  [ ]Distended   [ X]+BS  [ X]Non tender [ ]Tender  [ ]Other [ ]PEG [ ]OGT/ NGT   Last BM:  11/29  GENITOURINARY:  [ ]Normal [X ] Incontinent   [ ]Oliguria/Anuria   [X ]Vilchis  MUSCULOSKELETAL:   [ ]Normal   [ X]Weakness  [XBed/Wheelchair bound [X ]Edema  NEUROLOGIC:   [ ]No focal deficits  [X ] Cognitive impairment  [ ] Dysphagia [ ]Dysarthria [ ] Paresis [ ]Other   SKIN:   [ ]Normal  [ ]Rash  [ ]Other  [X ]Pressure ulcer(s)  [ ]y [ ]n  Present on admission  sacral, b/l heel DTI. Please see nursing assessment for further details for which I have reviewed.      CRITICAL CARE:  [ ] Shock Present  [ ]Septic [ ]Cardiogenic [ ]Neurologic [ ]Hypovolemic  [ ]  Vasopressors [ ]  Inotropes   [ ] Respiratory failure present [ ] Mechanical Ventilation [ ] Non-invasive ventilatory support [ ] High-Flow  [ ] Acute  [ ] Chronic [ ] Hypoxic  [ ] Hypercarbic [ ] Other  [X ] Other organ failure- Skin    LABS: None new.     RADIOLOGY & ADDITIONAL STUDIES: None new. .     PROTEIN CALORIE MALNUTRITION: [ ] mild [ ] moderate [X ] severe- Please see the nutritionist note.  [ ] underweight [ ] morbid obesity    https://www.andeal.org/vault/8840/web/files/ONC/Table_Clinical%20Characteristics%20to%20Document%20Malnutrition-White%20JV%20et%20al%202012.pdf    Height (cm): 152.4 (11-11-24 @ 21:00), 149.9 (11-11-24 @ 13:29), 149.9 (11-01-24 @ 10:52)  Weight (kg): 51.9 (11-11-24 @ 21:00), 49 (11-11-24 @ 13:29), 47.6 (11-01-24 @ 10:52)  BMI (kg/m2): 22.3 (11-11-24 @ 21:00), 21.8 (11-11-24 @ 13:29), 21.2 (11-01-24 @ 10:52)    [ X] PPSV2 < or = 30% [ ] significant weight loss [ ] poor nutritional intake [ ] anasarca   Artificial Nutrition [ ]     Other REFERRALS:    [ ] Hospice  [ ]Child Life  [ X]Social Work  [ ]Case management [ ]Holistic Therapy [ ] Physical Therapy [ ] Dietary     Progress Notes - Care Coordination [C. Provider] (12-02-24 @ 14:18)      Palliative Performance Scale:  http://npcrc.org/files/news/palliative_performance_scale_ppsv2.pdf  (Ctrl +  left click to view)  Respiratory Distress Observation Tool:  https://homecareinformation.net/handouts/hen/Respiratory_Distress_Observation_Scale.pdf (Ctrl +  left click to view)  PAINAD Score:  http://geriatrictoolkit.[place].[facility]/cog/painad.pdf (Ctrl +  left click to view)

## 2024-12-04 NOTE — PROGRESS NOTE ADULT - PROBLEM SELECTOR PLAN 1
- Continue with Morphine gtt @1mg/hr  - Continue with morphine 8mg q1h PRN for dyspnea ( None required in a 24hr period 7am-7am)   - Bowel regimen while on opioids  - Last BM: 11/29

## 2024-12-04 NOTE — PROGRESS NOTE ADULT - NS ATTEND BILL GEN_ALL_CORE
Attending to bill
no
Attending to bill

## 2024-12-04 NOTE — PROGRESS NOTE ADULT - RESPIRATORY DISTRESS OBSERVATION: HEART RATE
90 – 109 beats
Less than 90 beats
90 – 109 beats
90 – 109 beats
Greater than or equal to 110 beats
Less than 90 beats
Less than 90 beats
90 – 109 beats
Less than 90 beats
90 – 109 beats
90 – 109 beats
Less than 90 beats
Less than 90 beats

## 2024-12-04 NOTE — PROGRESS NOTE ADULT - PAIN ASSESSMENT ADVANCED DEMENTIA: FACIAL EXPRESSION
Smiling or inexpressive
Smiling or inexpressive
Sad. Frightened. Frown.
Sad. Frightened. Frown.
Smiling or inexpressive
Facial grimacing
Smiling or inexpressive
Sad. Frightened. Frown.
Smiling or inexpressive

## 2024-12-04 NOTE — CHART NOTE - NSCHARTNOTESELECT_GEN_ALL_CORE
Event Note
Event Note
Transfer Note
Event Note
Event Note
Nutrition Services

## 2024-12-04 NOTE — PROGRESS NOTE ADULT - PAIN ASSESSMENT ADVANCED DEMENTIA: NEGATIVE VOCALIZATION
None
None
Repeated troubled calling out. Loud moaning or groaning. Crying.
None
None
Occasional moan or groan. Low-level speech with a negative or disapproving quality
None
Occasional moan or groan. Low-level speech with a negative or disapproving quality
None

## 2024-12-04 NOTE — PROGRESS NOTE ADULT - PROVIDER SPECIALTY LIST ADULT
Heme/Onc
Hospitalist
MIRELLA
Palliative Care
[FreeTextEntry1] : 53 year  yo female with intractable back, neck, and bilateral shoulder pain as well as intertrigo, and shoulder grooving, secondary to large ptotic breasts that interfere with the patient’s activities of normal daily living.  I feel that she is a good candidate for a breast reduction.  \par \par Based on the Schnur Scale, and a BSA of 1.67, 450 g of breast tissue will plan to be removed per breast, for a total of 900g\par \par The patient was counseled on the risks, benefits and alternatives of bilateral breast reduction including the risks of infection, bleeding, seroma, hematoma, fat necrosis, decreased/increased /loss of nipple sensation, nipple necrosis, skin flap necrosis, fat necrosis, dehiscence, asymmetry.  The patient understands the risks, all questions were answered and the patient gave informed consent.  The patient wishes to proceed with surgery and will be scheduled for bilateral breast reduction.\par \par \par 
Heme/Onc
MIRELLA
Heme/Onc
Heme/Onc
Hospitalist
MIRELLA
Heme/Onc
MIRELLA
Palliative Care
Vascular Cardiology
MIRELLA
Palliative Care
Hospitalist
Palliative Care
Palliative Care
Internal Medicine
Palliative Care
Hospitalist
Palliative Care

## 2024-12-04 NOTE — CHART NOTE - NSCHARTNOTEFT_GEN_A_CORE
Interim Note    Per RN staff, pt inappropriate for nutrition follow up at this time.    RD remains available.  Tammie Ceja, MS, RD, CDN, CNSC, [facility] TEAMS

## 2024-12-04 NOTE — PROGRESS NOTE ADULT - PROBLEM SELECTOR PLAN 6
- PPSV 10% The patient requires nursing assistance with ADLs,  - Supportive care.  - Turn and position.  - Continue with good skin care.

## 2024-12-04 NOTE — PROGRESS NOTE ADULT - PROBLEM SELECTOR PLAN 7
- Patient's daughter in law at the bedside. She shared with the team that the patient was restless this morning, pulling out nasal canula and stating that she was ready to diet. Emotional support provided.  - I called and spoke to the patient's son, Shon. Updates provided. Discussed with him about discontinuing the nasal canula as the patient was pulling it off and that it may be uncomfortable. Educated him that the oxygen could potentially prolong the dying process. Explained to him that the morphine would help the patient if she was experiencing shortness of breath. Shon said he was not ready for us to remove the nasal canula at this time. Emotional support provided.
- I called the patient's son, Shon. Updated as to current clinical condition and plan of care. Educated as to what to expect, questions answered and emotional support provided.
- Symptom management as above  - Family member at the [employer]  used. Name: Austin and ID #[id number]. He asked about IVDs. I explained that fluids are not recommended at this time. I explained to him that when ever we do an intervention, we must weigh the risks versus the benefits. I explained to him that at the end of life, administering fluids may be more of a risks than benefits. It can lead to complications such as swelling and symptoms like shortness of breath. He verbalized understanding. Also reassured him that we will continue with good oral care.   - I called and spoke to the patient's son, Shon. Updated as to current clinical condition and plan of care.  Educated as to what to expect, questions answered and emotional support provided.   - Disposition planning will be guided by clinical course.
- Symptom management as above  - I called and spoke to the patient's son, Shon. Updated as to current clinical condition and plan of care.  Educated as to what to expect, questions answered and emotional support provided.   - Disposition planning will be guided by clinical course.
- Symptom management as above  - Family updated at bedside  - patient will remain in pcu for symptom management
- Symptom management as above  - Family updated at bedside  - patient will remain in pcu for symptom management
- Patient spouse at the bedside. [employer]  used.  name: Arya ID # [id number].  - I also called the patient's son, Shon. Updated as to current clinical condition and plan of care.  Educated as to what to expect, questions answered and emotional support provided.

## 2024-12-04 NOTE — PROGRESS NOTE ADULT - PAIN ASSESSMENT ADVANCED DEMENTIA: BREATHING
Normal
Occasional labored breathing. Short period of hyperventilation
Normal

## 2024-12-04 NOTE — PROGRESS NOTE ADULT - PAIN ASSESSMENT ADVANCED DEMENTIA: BODY LANGUAGE
Relaxed
Tense. Distressed pacing. Fidgeting.
Relaxed
Relaxed

## 2024-12-04 NOTE — PROGRESS NOTE ADULT - REASON FOR ADMISSION
saddle PE

## 2024-12-04 NOTE — PROGRESS NOTE ADULT - NS ATTEND AMEND GEN_ALL_CORE FT
74 year old woman w/ PMHx of pancreatic cancer on chemotherapy, DVT on Eliquis 2.5mg BID, HTN, recent admission for symptomatic anemia requiring blood transfusion. Now presenting with SOB as well as melena s/p transfusion 11/11, found to have saddle PE and started on HFNC. [facility] team consulted for complex medical decision making in the setting of serious illness and symptoms management. In PCU for comfort care and symptom management     1) Pain management.  Mostly controlled.   - Continue Morphine infusion @ 1mg/hr  - Continue Morphine PRN 4-8mg IV q1hr PRN for moderate to severe pain.     2) Acute respiratory distress   Controlled   - Will continue Morphine infusion as above  - Will continue Morphine 8mg IV q1hr PRN.     3) Encounter for palliative care.   Rest of the assessment and plan as per TIFFANY Epstein's note   Will continue PCU care for advanced symptoms monitoring and treatment.      Sarbjit Sanchez MD  Associate Chief Geriatrics and Palliative Care ([facility]) [facility]   [facility]   , [employer] at [facility]      Please contact me via Teams from Monday through Friday between 9am-5pm. If not answering, please call the palliative care pager [phone]    After 5pm and on weekends, please see the contact information below:    In the event of newly developing, evolving, or worsening symptoms, please contact the Palliative Medicine team via pager (if the patient is at [facility] #[phone] or if the patient is at [facility] #[phone]) The Geriatric and Palliative Medicine service has coverage 24 hours a day/ 7 days a week to provide medical recommendations regarding symptom management needs via telephone

## 2024-12-04 NOTE — PROGRESS NOTE ADULT - NS ATTEST RISK PROBLEM GEN_ALL_CORE FT
1. Number and complexity of problems addressed for this patient:    1.1 Moderate (At least 1)  [ ] 1 or more chronic illnesses with exacerbation, progression, or side effects of treatment  [ ] 2 or more stable chronic illnesses  [ ] 1 undiagnosed new problem with uncertain prognosis  [ ] 1 acute illness with systemic symptoms  [ ] 1 acute complicated injury  1.2 High (At least 1)   [x ] 1 or more chronic illnesses with severe exacerbation, progression, or side effects of treatment  [ x] 1 acute or chronic illnesses or injuries that may pose a threat to life or bodily function    2. Amount and/or Complexity of Data that was Reviewed and Analyzed for this case:       Moderate (1 out of 3)       High (2 out of 3)  2.1. (Any combination of 3 of the following)   [ ] Prior External notes were reviewed  [ ] Each test result was reviewed (see "LABS" and "RADIOLOGY & ADDITIONAL STUDIES" above)  [ ] The following tests were ordered and/or reviewed (Only count 1 point for ordering or reviewing a unique test):  	[ ]CBC  	[ ] Chemistry   	[ ] Imaging   	[ ] Other:   [ ] Assessment requiring an independent historian   		Name of historian and relationship:   2.2  [ ] Personally review and interpretation of  image or testing   2.3  [ ] Discussion of management or test interpretation with external physician/other qualified health care professional\appropriate source (not separately reported)    3. Risk of Complications and/or Morbidity or Mortality of for this Patient’s Management:  3.1 Moderate risk of morbidity from additional diagnostic testing or treatment (At least 1):   [ ] Prescription drug management   [ ] Decision regarding minor surgery, treatment, or procedure with identified patient or procedure risk factors  [ ] Decision regarding elective major surgery, treatment, or procedure without identified patient or procedure risk factors   [ ] Diagnosis or treatment significantly limited by social determinants of health   [ ] Other:   3.2 High risk of morbidity from additional diagnostic testing or treatment (At least 1):   [ x] Drug therapy requiring intensive monitoring for toxicity   [ ] Decision regarding elective major surgery, treatment, or procedure with identified patient or procedure risk factors   [ ] Decision regarding emergency major surgery, treatment, or procedure   [ ] Decision regarding hospitalization or escalation of hospital-level of care  [ ] Decision not to resuscitate, not to intubate, or to de-escalate care because of poor prognosis   [ ] Decision to proceed or not with artificial nutrition   [ x] Parenteral controlled substance  [ ] Other:
1. Number and complexity of problems addressed for this patient:    1.1 Moderate (At least 1)  [ ] 1 or more chronic illnesses with exacerbation, progression, or side effects of treatment  [ ] 2 or more stable chronic illnesses  [ ] 1 undiagnosed new problem with uncertain prognosis  [ ] 1 acute illness with systemic symptoms  [ ] 1 acute complicated injury  1.2 High (At least 1)   [x ] 1 or more chronic illnesses with severe exacerbation, progression, or side effects of treatment  [ x] 1 acute or chronic illnesses or injuries that may pose a threat to life or bodily function    2. Amount and/or Complexity of Data that was Reviewed and Analyzed for this case:       Moderate (1 out of 3)       High (2 out of 3)  2.1. (Any combination of 3 of the following)   [ ] Prior External notes were reviewed  [ ] Each test result was reviewed (see "LABS" and "RADIOLOGY & ADDITIONAL STUDIES" above)  [ ] The following tests were ordered and/or reviewed (Only count 1 point for ordering or reviewing a unique test):  	[ ]CBC  	[ ] Chemistry   	[ ] Imaging   	[ ] Other:   [ ] Assessment requiring an independent historian   		Name of historian and relationship:   2.2  [ ] Personally review and interpretation of  image or testing   2.3  [ ] Discussion of management or test interpretation with external physician/other qualified health care professional\appropriate source (not separately reported)    3. Risk of Complications and/or Morbidity or Mortality of for this Patient’s Management:  3.1 Moderate risk of morbidity from additional diagnostic testing or treatment (At least 1):   [ ] Prescription drug management   [ ] Decision regarding minor surgery, treatment, or procedure with identified patient or procedure risk factors  [ ] Decision regarding elective major surgery, treatment, or procedure without identified patient or procedure risk factors   [ ] Diagnosis or treatment significantly limited by social determinants of health   [ ] Other:   3.2 High risk of morbidity from additional diagnostic testing or treatment (At least 1):   [ x] Drug therapy requiring intensive monitoring for toxicity   [ ] Decision regarding elective major surgery, treatment, or procedure with identified patient or procedure risk factors   [ ] Decision regarding emergency major surgery, treatment, or procedure   [ ] Decision regarding hospitalization or escalation of hospital-level of care  [ ] Decision not to resuscitate, not to intubate, or to de-escalate care because of poor prognosis   [ ] Decision to proceed or not with artificial nutrition   [ x] Parenteral controlled substance  [ ] Other:
1. Number and complexity of problems addressed for this patient:    1.1 Moderate (At least 1)  [ ] 1 or more chronic illnesses with exacerbation, progression, or side effects of treatment  [ ] 2 or more stable chronic illnesses  [ ] 1 undiagnosed new problem with uncertain prognosis  [ ] 1 acute illness with systemic symptoms  [ ] 1 acute complicated injury  1.2 High (At least 1)   [ ] 1 or more chronic illnesses with severe exacerbation, progression, or side effects of treatment  [x ] 1 acute or chronic illnesses or injuries that may pose a threat to life or bodily function (saddle PE)    2. Amount and/or Complexity of Data that was Reviewed and Analyzed for this case:       Moderate (1 out of 3)       High (2 out of 3)  2.1. (Any combination of 3 of the following)   [ ] Prior External notes were reviewed  [ ] Each test result was reviewed (see "LABS" and "RADIOLOGY & ADDITIONAL STUDIES" above)  [ ] The following tests were ordered and/or reviewed (Only count 1 point for ordering or reviewing a unique test):  	[ ]CBC  	[ ] Chemistry   	[ ] Imaging   	[ ] Other:   [ ] Assessment requiring an independent historian   		Name of historian and relationship:   2.2  [ ] Personally review and interpretation of  image or testing   2.3  [ ] Discussion of management or test interpretation with external physician/other qualified health care professional\appropriate source (not separately reported)    3. Risk of Complications and/or Morbidity or Mortality of for this Patient’s Management:  3.1 Moderate risk of morbidity from additional diagnostic testing or treatment (At least 1):   [ ] Prescription drug management   [ ] Decision regarding minor surgery, treatment, or procedure with identified patient or procedure risk factors  [ ] Decision regarding elective major surgery, treatment, or procedure without identified patient or procedure risk factors   [ ] Diagnosis or treatment significantly limited by social determinants of health   [ ] Other:   3.2 High risk of morbidity from additional diagnostic testing or treatment (At least 1):   [x ] Drug therapy requiring intensive monitoring for toxicity  (Lovenox)  [ ] Decision regarding elective major surgery, treatment, or procedure with identified patient or procedure risk factors   [ ] Decision regarding emergency major surgery, treatment, or procedure   [ ] Decision regarding hospitalization or escalation of hospital-level of care  [ ] Decision not to resuscitate, not to intubate, or to de-escalate care because of poor prognosis   [ ] Decision to proceed or not with artificial nutrition   [ ] Parenteral controlled substance  [ ] Other:
1. Number and complexity of problems addressed for this patient:    1.1 Moderate (At least 1)  [ ] 1 or more chronic illnesses with exacerbation, progression, or side effects of treatment  [ ] 2 or more stable chronic illnesses  [ ] 1 undiagnosed new problem with uncertain prognosis  [ ] 1 acute illness with systemic symptoms  [ ] 1 acute complicated injury  1.2 High (At least 1)   [x ] 1 or more chronic illnesses with severe exacerbation, progression, or side effects of treatment  [x] 1 acute or chronic illnesses or injuries that may pose a threat to life or bodily function    2. Amount and/or Complexity of Data that was Reviewed and Analyzed for this case:       Moderate (1 out of 3)       High (2 out of 3)  2.1. (Any combination of 3 of the following)   [ ] Prior External notes were reviewed  [ ] Each test result was reviewed (see "LABS" and "RADIOLOGY & ADDITIONAL STUDIES" above)  [ ] The following tests were ordered and/or reviewed (Only count 1 point for ordering or reviewing a unique test):  	[ ]CBC  	[ ] Chemistry   	[ ] Imaging   	[ ] Other:   [ ] Assessment requiring an independent historian   		Name of historian and relationship:   2.2  [ ] Personally review and interpretation of  image or testing   2.3  [ ] Discussion of management or test interpretation with external physician/other qualified health care professional\appropriate source (not separately reported)    3. Risk of Complications and/or Morbidity or Mortality of for this Patient’s Management:  3.1 Moderate risk of morbidity from additional diagnostic testing or treatment (At least 1):   [ ] Prescription drug management   [ ] Decision regarding minor surgery, treatment, or procedure with identified patient or procedure risk factors  [ ] Decision regarding elective major surgery, treatment, or procedure without identified patient or procedure risk factors   [ ] Diagnosis or treatment significantly limited by social determinants of health   [ ] Other:   3.2 High risk of morbidity from additional diagnostic testing or treatment (At least 1):   [ x] Drug therapy requiring intensive monitoring for toxicity   [ ] Decision regarding elective major surgery, treatment, or procedure with identified patient or procedure risk factors   [ ] Decision regarding emergency major surgery, treatment, or procedure   [ ] Decision regarding hospitalization or escalation of hospital-level of care  [ ] Decision not to resuscitate, not to intubate, or to de-escalate care because of poor prognosis   [ ] Decision to proceed or not with artificial nutrition   [ x] Parenteral controlled substance  [ ] Other:
1. Number and complexity of problems addressed for this patient:    1.1 Moderate (At least 1)  [ ] 1 or more chronic illnesses with exacerbation, progression, or side effects of treatment  [ ] 2 or more stable chronic illnesses  [ ] 1 undiagnosed new problem with uncertain prognosis  [ ] 1 acute illness with systemic symptoms  [ ] 1 acute complicated injury  1.2 High (At least 1)   [ ] 1 or more chronic illnesses with severe exacerbation, progression, or side effects of treatment  [x ] 1 acute or chronic illnesses or injuries that may pose a threat to life or bodily function (saddle PE)    2. Amount and/or Complexity of Data that was Reviewed and Analyzed for this case:       Moderate (1 out of 3)       High (2 out of 3)  2.1. (Any combination of 3 of the following)   [ ] Prior External notes were reviewed  [ ] Each test result was reviewed (see "LABS" and "RADIOLOGY & ADDITIONAL STUDIES" above)  [ ] The following tests were ordered and/or reviewed (Only count 1 point for ordering or reviewing a unique test):  	[ ]CBC  	[ ] Chemistry   	[ ] Imaging   	[ ] Other:   [ ] Assessment requiring an independent historian   		Name of historian and relationship:   2.2  [ ] Personally review and interpretation of  image or testing   2.3  [ ] Discussion of management or test interpretation with external physician/other qualified health care professional\appropriate source (not separately reported)    3. Risk of Complications and/or Morbidity or Mortality of for this Patient’s Management:  3.1 Moderate risk of morbidity from additional diagnostic testing or treatment (At least 1):   [ ] Prescription drug management   [ ] Decision regarding minor surgery, treatment, or procedure with identified patient or procedure risk factors  [ ] Decision regarding elective major surgery, treatment, or procedure without identified patient or procedure risk factors   [ ] Diagnosis or treatment significantly limited by social determinants of health   [ ] Other:   3.2 High risk of morbidity from additional diagnostic testing or treatment (At least 1):   [x ] Drug therapy requiring intensive monitoring for toxicity  (Lovenox)  [ ] Decision regarding elective major surgery, treatment, or procedure with identified patient or procedure risk factors   [ ] Decision regarding emergency major surgery, treatment, or procedure   [ ] Decision regarding hospitalization or escalation of hospital-level of care  [ ] Decision not to resuscitate, not to intubate, or to de-escalate care because of poor prognosis   [ ] Decision to proceed or not with artificial nutrition   [ ] Parenteral controlled substance  [ ] Other:
1. Number and complexity of problems addressed for this patient:    1.1 Moderate (At least 1)  [ ] 1 or more chronic illnesses with exacerbation, progression, or side effects of treatment  [ ] 2 or more stable chronic illnesses  [ ] 1 undiagnosed new problem with uncertain prognosis  [ ] 1 acute illness with systemic symptoms  [ ] 1 acute complicated injury  1.2 High (At least 1)   [ ] 1 or more chronic illnesses with severe exacerbation, progression, or side effects of treatment  [x ] 1 acute or chronic illnesses or injuries that may pose a threat to life or bodily function (saddle PE)    2. Amount and/or Complexity of Data that was Reviewed and Analyzed for this case:       Moderate (1 out of 3)       High (2 out of 3)  2.1. (Any combination of 3 of the following)   [ ] Prior External notes were reviewed  [ ] Each test result was reviewed (see "LABS" and "RADIOLOGY & ADDITIONAL STUDIES" above)  [ ] The following tests were ordered and/or reviewed (Only count 1 point for ordering or reviewing a unique test):  	[ ]CBC  	[ ] Chemistry   	[ ] Imaging   	[ ] Other:   [ ] Assessment requiring an independent historian   		Name of historian and relationship:   2.2  [ ] Personally review and interpretation of  image or testing   2.3  [ ] Discussion of management or test interpretation with external physician/other qualified health care professional\appropriate source (not separately reported)    3. Risk of Complications and/or Morbidity or Mortality of for this Patient’s Management:  3.1 Moderate risk of morbidity from additional diagnostic testing or treatment (At least 1):   [ ] Prescription drug management   [ ] Decision regarding minor surgery, treatment, or procedure with identified patient or procedure risk factors  [ ] Decision regarding elective major surgery, treatment, or procedure without identified patient or procedure risk factors   [ ] Diagnosis or treatment significantly limited by social determinants of health   [ ] Other:   3.2 High risk of morbidity from additional diagnostic testing or treatment (At least 1):   [x ] Drug therapy requiring intensive monitoring for toxicity  (Lovenox)  [ ] Decision regarding elective major surgery, treatment, or procedure with identified patient or procedure risk factors   [ ] Decision regarding emergency major surgery, treatment, or procedure   [ ] Decision regarding hospitalization or escalation of hospital-level of care  [ ] Decision not to resuscitate, not to intubate, or to de-escalate care because of poor prognosis   [ ] Decision to proceed or not with artificial nutrition   [ ] Parenteral controlled substance  [ ] Other:
1. Number and complexity of problems addressed for this patient:    1.1 Moderate (At least 1)  [ ] 1 or more chronic illnesses with exacerbation, progression, or side effects of treatment  [ ] 2 or more stable chronic illnesses  [ ] 1 undiagnosed new problem with uncertain prognosis  [ ] 1 acute illness with systemic symptoms  [ ] 1 acute complicated injury  1.2 High (At least 1)   [x ] 1 or more chronic illnesses with severe exacerbation, progression, or side effects of treatment  [x] 1 acute or chronic illnesses or injuries that may pose a threat to life or bodily function    2. Amount and/or Complexity of Data that was Reviewed and Analyzed for this case:       Moderate (1 out of 3)       High (2 out of 3)  2.1. (Any combination of 3 of the following)   [ ] Prior External notes were reviewed  [ ] Each test result was reviewed (see "LABS" and "RADIOLOGY & ADDITIONAL STUDIES" above)  [ ] The following tests were ordered and/or reviewed (Only count 1 point for ordering or reviewing a unique test):  	[ ]CBC  	[ ] Chemistry   	[ ] Imaging   	[ ] Other:   [ ] Assessment requiring an independent historian   		Name of historian and relationship:   2.2  [ ] Personally review and interpretation of  image or testing   2.3  [ ] Discussion of management or test interpretation with external physician/other qualified health care professional\appropriate source (not separately reported)    3. Risk of Complications and/or Morbidity or Mortality of for this Patient’s Management:  3.1 Moderate risk of morbidity from additional diagnostic testing or treatment (At least 1):   [ ] Prescription drug management   [ ] Decision regarding minor surgery, treatment, or procedure with identified patient or procedure risk factors  [ ] Decision regarding elective major surgery, treatment, or procedure without identified patient or procedure risk factors   [ ] Diagnosis or treatment significantly limited by social determinants of health   [ ] Other:   3.2 High risk of morbidity from additional diagnostic testing or treatment (At least 1):   [ x] Drug therapy requiring intensive monitoring for toxicity   [ ] Decision regarding elective major surgery, treatment, or procedure with identified patient or procedure risk factors   [ ] Decision regarding emergency major surgery, treatment, or procedure   [ ] Decision regarding hospitalization or escalation of hospital-level of care  [ ] Decision not to resuscitate, not to intubate, or to de-escalate care because of poor prognosis   [ ] Decision to proceed or not with artificial nutrition   [ x] Parenteral controlled substance  [ ] Other:
1. Number and complexity of problems addressed for this patient:    1.1 Moderate (At least 1)  [ ] 1 or more chronic illnesses with exacerbation, progression, or side effects of treatment  [ ] 2 or more stable chronic illnesses  [ ] 1 undiagnosed new problem with uncertain prognosis  [ ] 1 acute illness with systemic symptoms  [ ] 1 acute complicated injury  1.2 High (At least 1)   [x ] 1 or more chronic illnesses with severe exacerbation, progression, or side effects of treatment  [x] 1 acute or chronic illnesses or injuries that may pose a threat to life or bodily function    2. Amount and/or Complexity of Data that was Reviewed and Analyzed for this case:       Moderate (1 out of 3)       High (2 out of 3)  2.1. (Any combination of 3 of the following)   [ ] Prior External notes were reviewed  [ ] Each test result was reviewed (see "LABS" and "RADIOLOGY & ADDITIONAL STUDIES" above)  [ ] The following tests were ordered and/or reviewed (Only count 1 point for ordering or reviewing a unique test):  	[ ]CBC  	[ ] Chemistry   	[ ] Imaging   	[ ] Other:   [ ] Assessment requiring an independent historian   		Name of historian and relationship:   2.2  [ ] Personally review and interpretation of  image or testing   2.3  [ ] Discussion of management or test interpretation with external physician/other qualified health care professional\appropriate source (not separately reported)    3. Risk of Complications and/or Morbidity or Mortality of for this Patient’s Management:  3.1 Moderate risk of morbidity from additional diagnostic testing or treatment (At least 1):   [ ] Prescription drug management   [ ] Decision regarding minor surgery, treatment, or procedure with identified patient or procedure risk factors  [ ] Decision regarding elective major surgery, treatment, or procedure without identified patient or procedure risk factors   [ ] Diagnosis or treatment significantly limited by social determinants of health   [ ] Other:   3.2 High risk of morbidity from additional diagnostic testing or treatment (At least 1):   [ x] Drug therapy requiring intensive monitoring for toxicity   [ ] Decision regarding elective major surgery, treatment, or procedure with identified patient or procedure risk factors   [ ] Decision regarding emergency major surgery, treatment, or procedure   [ ] Decision regarding hospitalization or escalation of hospital-level of care  [ ] Decision not to resuscitate, not to intubate, or to de-escalate care because of poor prognosis   [ ] Decision to proceed or not with artificial nutrition   [ x] Parenteral controlled substance  [ ] Other:
Symptom assessment and management, supportive counseling, coordination of care

## 2024-12-04 NOTE — PROGRESS NOTE ADULT - PAIN ASSESSMENT ADVANCED DEMENTIA: CONSOLABILITY
No need to console
Distracted or reassured by voice or touch
No need to console
Distracted or reassured by voice or touch
No need to console

## 2024-12-04 NOTE — PROGRESS NOTE ADULT - NUTRITIONAL ASSESSMENT
This patient has been assessed with a concern for Malnutrition and has been determined to have a diagnosis/diagnoses of Severe protein-calorie malnutrition.  
This patient has been assessed with a concern for Malnutrition and has been determined to have a diagnosis/diagnoses of Severe protein-calorie malnutrition.    This patient is being managed with:   Diet Pureed-  Mildly Thick Liquids (MILDTHICKLIQS)  Supplement Feeding Modality:  Oral  Ensure Plus High Protein Cans or Servings Per Day:  1       Frequency:  Daily  Entered: Nov 18 2024 12:40PM  
This patient has been assessed with a concern for Malnutrition and has been determined to have a diagnosis/diagnoses of Severe protein-calorie malnutrition.  
This patient has been assessed with a concern for Malnutrition and has been determined to have a diagnosis/diagnoses of Severe protein-calorie malnutrition.  
This patient has been assessed with a concern for Malnutrition and has been determined to have a diagnosis/diagnoses of Severe protein-calorie malnutrition.    This patient has been assessed with a concern for Malnutrition and has been determined to have a diagnosis/diagnoses of Severe protein-calorie malnutrition.  
This patient has been assessed with a concern for Malnutrition and has been determined to have a diagnosis/diagnoses of Severe protein-calorie malnutrition.    This patient is being managed with:   Diet Pureed-  Mildly Thick Liquids (MILDTHICKLIQS)  Supplement Feeding Modality:  Oral  Ensure Plus High Protein Cans or Servings Per Day:  1       Frequency:  Daily  Entered: Nov 18 2024 12:40PM  
This patient has been assessed with a concern for Malnutrition and has been determined to have a diagnosis/diagnoses of Severe protein-calorie malnutrition.    This patient is being managed with:   Diet Pureed-  Mildly Thick Liquids (MILDTHICKLIQS)  Entered: Nov 17 2024  8:37AM  
This patient has been assessed with a concern for Malnutrition and has been determined to have a diagnosis/diagnoses of Severe protein-calorie malnutrition.    This patient is being managed with:   Diet Pureed-  Mildly Thick Liquids (MILDTHICKLIQS)  Supplement Feeding Modality:  Oral  Ensure Plus High Protein Cans or Servings Per Day:  1       Frequency:  Daily  Entered: Nov 18 2024 12:40PM  
This patient has been assessed with a concern for Malnutrition and has been determined to have a diagnosis/diagnoses of Severe protein-calorie malnutrition.    This patient is being managed with:   Diet Pureed-  Mildly Thick Liquids (MILDTHICKLIQS)  Entered: Nov 17 2024  8:37AM  
This patient has been assessed with a concern for Malnutrition and has been determined to have a diagnosis/diagnoses of Severe protein-calorie malnutrition.    This patient is being managed with:   Diet Pureed-  Mildly Thick Liquids (MILDTHICKLIQS)  Supplement Feeding Modality:  Oral  Ensure Plus High Protein Cans or Servings Per Day:  1       Frequency:  Daily  Entered: Nov 18 2024 12:40PM  

## 2024-12-04 NOTE — PROGRESS NOTE ADULT - PROBLEM SELECTOR PLAN 2
- Continue with Morphine gtt @1mg/hr  - Continue with Morphine 4mg IV q1h PRN moderate pain. ( None required in a 24hr period 7am-7am)   - Continue with Morphine 8mg IV q1h PRN severe pain. ( None  required in a 24hr period 7am-7am)   - Bowel regimen while on opioids

## 2024-12-04 NOTE — PROGRESS NOTE ADULT - RESPIRATORY DISTRESS OBSERVATION: RESPIRATORY RATE
Less than or equal to 18 breaths
19 – 30 breaths
Less than or equal to 18 breaths
19 – 30 breaths
Less than or equal to 18 breaths
19 – 30 breaths
Less than or equal to 18 breaths

## 2024-12-04 NOTE — PROGRESS NOTE ADULT - NS ATTEND OPT1 GEN_ALL_CORE

## 2024-12-04 NOTE — PROGRESS NOTE ADULT - [place] SYMPTOM ASSESSMENT: OTHER PROBLEM DESCRIPTION
Unable to self report.
Unable to self report.
unable to obtained due to patient's medical condition
Unable to self report.
Unable to self report.
The patient is unable to self-report.
Unable to self report.
Unable to self report due to poor mentation.
Unable to self report.
The patient is unable to self-report.

## 2024-12-05 NOTE — PROVIDER CONTACT NOTE (OTHER) - REASON
pt expiration
Pt refused BIPAP at night
aPTT 57.3, pt specific heparin gtt, family refusing q6 blood draws to achieve therapeutic range (aPTT 60-80)

## 2024-12-05 NOTE — PROVIDER CONTACT NOTE (OTHER) - RECOMMENDATIONS
DEANGELO Kingston notified and aware.
Notify provider Bianca Gilbert
pt pronounced dead at 8 am   family at bedside

## 2024-12-05 NOTE — PROVIDER CONTACT NOTE (OTHER) - ASSESSMENT
Pt A&Ox4, mandarin speaking, family (son) prefers to interpret for pt.
No response to external stimuli  No spontaneous respirations  No apical heart rate  Negative papillary response to light
Pt is A&O x4, mandarin speaking, Vital signs stable, O2 sat: 94, currently on HFNC at 50L/min refused the use of BIPAP at night, wishes to remain on high flow.

## 2024-12-05 NOTE — DISCHARGE NOTE FOR THE EXPIRED PATIENT - HOSPITAL COURSE
74 year old woman w/ PMHx of pancreatic cancer on chemotherapy, DVT on Eliquis 2.5mg BID, HTN, recent admission for symptomatic anemia requiring blood transfusion. Admitted with SOB as well as melena s/p transfusion , found to have saddle PE and started on HFNC.   Patient transferred to PCU for management of sx and disposition planning.  Patient successfully weaned off HFNC and is on oxygen via nc.  The patient  on 24 at 08:28AM.

## 2024-12-05 NOTE — PROVIDER CONTACT NOTE (OTHER) - SITUATION
Pt refused BIPAP at night
Patient without spontaneous respirations or pulse
aPTT 57.3, pt specific heparin gtt, family refusing q6 blood draws to achieve therapeutic range (aPTT 60-80)

## 2024-12-05 NOTE — PROVIDER CONTACT NOTE (OTHER) - BACKGROUND
(Admit Diagnosis) Other pulmonary embolism without acute cor pulmonale, Anemia, Acute hypoxic respiratory failure, Pancreatic cancer  ,
Patient has DNR order
(Admit Diagnosis) Other pulmonary embolism without acute cor pulmonale; Dx: Palliative care encounter; Pancreatic cancer

## 2024-12-05 NOTE — PROVIDER CONTACT NOTE (OTHER) - ACTION/TREATMENT ORDERED:
Provider Bianca Gilbert notified and aware. Will continue to monitor, patient safety maintained.
DEANGELO Kingston notified and aware. To order rate increase of 12.5 mL/hr. Team aware of daily blood draws for pt. Pt safety maintained.
see pt expiration note

## 2024-12-05 NOTE — DISCHARGE NOTE FOR THE EXPIRED PATIENT - SECONDARY DIAGNOSIS.
Advance care planning Pain management Functional quadriplegia Acute hypoxic respiratory failure Encounter for palliative care Terminal restlessness

## 2024-12-16 NOTE — PROGRESS NOTE ADULT - TIME-BASED
